# Patient Record
Sex: FEMALE | Race: WHITE | Employment: OTHER | URBAN - METROPOLITAN AREA
[De-identification: names, ages, dates, MRNs, and addresses within clinical notes are randomized per-mention and may not be internally consistent; named-entity substitution may affect disease eponyms.]

---

## 2016-12-01 PROCEDURE — G9678 ONCOLOGY CARE MODEL SERVICE: HCPCS | Performed by: INTERNAL MEDICINE

## 2017-01-13 ENCOUNTER — SNF/IP PROF CHARGE ONLY (OUTPATIENT)
Dept: HEMATOLOGY/ONCOLOGY | Facility: HOSPITAL | Age: 82
End: 2017-01-13

## 2017-01-13 DIAGNOSIS — Z51.81 ENCOUNTER FOR MONITORING AROMATASE INHIBITOR THERAPY: Primary | ICD-10-CM

## 2017-01-13 DIAGNOSIS — Z79.811 ENCOUNTER FOR MONITORING AROMATASE INHIBITOR THERAPY: Primary | ICD-10-CM

## 2017-04-18 RX ORDER — ANASTROZOLE 1 MG/1
TABLET ORAL
Qty: 90 TABLET | Refills: 3 | Status: SHIPPED | OUTPATIENT
Start: 2017-04-18 | End: 2017-06-06

## 2017-05-05 ENCOUNTER — LAB REQUISITION (OUTPATIENT)
Dept: LAB | Facility: HOSPITAL | Age: 82
End: 2017-05-05
Payer: MEDICARE

## 2017-05-05 DIAGNOSIS — E78.00 PURE HYPERCHOLESTEROLEMIA: ICD-10-CM

## 2017-05-05 DIAGNOSIS — N18.30 CHRONIC KIDNEY DISEASE, STAGE III (MODERATE) (HCC): ICD-10-CM

## 2017-05-05 PROCEDURE — 85025 COMPLETE CBC W/AUTO DIFF WBC: CPT | Performed by: INTERNAL MEDICINE

## 2017-05-05 PROCEDURE — 82043 UR ALBUMIN QUANTITATIVE: CPT | Performed by: INTERNAL MEDICINE

## 2017-05-05 PROCEDURE — 85025 COMPLETE CBC W/AUTO DIFF WBC: CPT

## 2017-05-05 PROCEDURE — 81001 URINALYSIS AUTO W/SCOPE: CPT | Performed by: INTERNAL MEDICINE

## 2017-05-05 PROCEDURE — 84443 ASSAY THYROID STIM HORMONE: CPT | Performed by: INTERNAL MEDICINE

## 2017-05-05 PROCEDURE — 80061 LIPID PANEL: CPT | Performed by: INTERNAL MEDICINE

## 2017-05-05 PROCEDURE — 82570 ASSAY OF URINE CREATININE: CPT | Performed by: INTERNAL MEDICINE

## 2017-05-05 PROCEDURE — 84443 ASSAY THYROID STIM HORMONE: CPT

## 2017-05-05 PROCEDURE — 80053 COMPREHEN METABOLIC PANEL: CPT

## 2017-05-05 PROCEDURE — 80053 COMPREHEN METABOLIC PANEL: CPT | Performed by: INTERNAL MEDICINE

## 2017-05-05 PROCEDURE — 80061 LIPID PANEL: CPT

## 2017-05-23 ENCOUNTER — LAB REQUISITION (OUTPATIENT)
Dept: LAB | Facility: HOSPITAL | Age: 82
End: 2017-05-23
Payer: MEDICARE

## 2017-05-23 ENCOUNTER — HOSPITAL ENCOUNTER (OUTPATIENT)
Dept: ULTRASOUND IMAGING | Facility: HOSPITAL | Age: 82
Discharge: HOME | End: 2017-05-23
Attending: INTERNAL MEDICINE
Payer: MEDICARE

## 2017-05-23 DIAGNOSIS — N18.30 CHRONIC KIDNEY DISEASE, STAGE III (MODERATE) (HCC): ICD-10-CM

## 2017-05-23 DIAGNOSIS — N18.4 CHRONIC KIDNEY DISEASE, STAGE IV (SEVERE) (HCC): ICD-10-CM

## 2017-05-23 PROCEDURE — 80048 BASIC METABOLIC PNL TOTAL CA: CPT | Performed by: INTERNAL MEDICINE

## 2017-05-23 PROCEDURE — 76770 US EXAM ABDO BACK WALL COMP: CPT | Performed by: INTERNAL MEDICINE

## 2017-06-06 ENCOUNTER — HOSPITAL ENCOUNTER (INPATIENT)
Facility: HOSPITAL | Age: 82
LOS: 3 days | Discharge: NURSING, REHAB OR LONG-TERM CARE | DRG: 641 | End: 2017-06-09
Attending: EMERGENCY MEDICINE | Admitting: INTERNAL MEDICINE
Payer: MEDICARE

## 2017-06-06 ENCOUNTER — APPOINTMENT (OUTPATIENT)
Dept: GENERAL RADIOLOGY | Facility: HOSPITAL | Age: 82
DRG: 641 | End: 2017-06-06
Attending: EMERGENCY MEDICINE
Payer: MEDICARE

## 2017-06-06 DIAGNOSIS — E87.1 HYPONATREMIA: ICD-10-CM

## 2017-06-06 DIAGNOSIS — R00.0 SINUS TACHYCARDIA: ICD-10-CM

## 2017-06-06 DIAGNOSIS — R53.1 WEAKNESS: Primary | ICD-10-CM

## 2017-06-06 DIAGNOSIS — R77.8 ELEVATED TROPONIN: ICD-10-CM

## 2017-06-06 PROBLEM — R79.89 ELEVATED TROPONIN: Status: ACTIVE | Noted: 2017-06-06

## 2017-06-06 PROCEDURE — 99285 EMERGENCY DEPT VISIT HI MDM: CPT

## 2017-06-06 PROCEDURE — 80061 LIPID PANEL: CPT | Performed by: EMERGENCY MEDICINE

## 2017-06-06 PROCEDURE — 83690 ASSAY OF LIPASE: CPT | Performed by: EMERGENCY MEDICINE

## 2017-06-06 PROCEDURE — 84484 ASSAY OF TROPONIN QUANT: CPT | Performed by: EMERGENCY MEDICINE

## 2017-06-06 PROCEDURE — 80048 BASIC METABOLIC PNL TOTAL CA: CPT | Performed by: EMERGENCY MEDICINE

## 2017-06-06 PROCEDURE — 82728 ASSAY OF FERRITIN: CPT | Performed by: INTERNAL MEDICINE

## 2017-06-06 PROCEDURE — 93005 ELECTROCARDIOGRAM TRACING: CPT

## 2017-06-06 PROCEDURE — 81001 URINALYSIS AUTO W/SCOPE: CPT | Performed by: EMERGENCY MEDICINE

## 2017-06-06 PROCEDURE — 80076 HEPATIC FUNCTION PANEL: CPT | Performed by: EMERGENCY MEDICINE

## 2017-06-06 PROCEDURE — 93010 ELECTROCARDIOGRAM REPORT: CPT | Performed by: EMERGENCY MEDICINE

## 2017-06-06 PROCEDURE — 85025 COMPLETE CBC W/AUTO DIFF WBC: CPT | Performed by: EMERGENCY MEDICINE

## 2017-06-06 PROCEDURE — 36415 COLL VENOUS BLD VENIPUNCTURE: CPT

## 2017-06-06 PROCEDURE — 71010 XR CHEST AP PORTABLE  (CPT=71010): CPT | Performed by: EMERGENCY MEDICINE

## 2017-06-06 RX ORDER — SODIUM CHLORIDE 9 MG/ML
INJECTION, SOLUTION INTRAVENOUS CONTINUOUS
Status: DISCONTINUED | OUTPATIENT
Start: 2017-06-06 | End: 2017-06-08

## 2017-06-06 RX ORDER — ACETAMINOPHEN 325 MG/1
650 TABLET ORAL EVERY 6 HOURS PRN
Status: DISCONTINUED | OUTPATIENT
Start: 2017-06-06 | End: 2017-06-09

## 2017-06-06 RX ORDER — POLYETHYLENE GLYCOL 3350 17 G/17G
17 POWDER, FOR SOLUTION ORAL DAILY PRN
Status: DISCONTINUED | OUTPATIENT
Start: 2017-06-06 | End: 2017-06-09

## 2017-06-06 RX ORDER — ANASTROZOLE 1 MG/1
1 TABLET ORAL DAILY
Status: DISCONTINUED | OUTPATIENT
Start: 2017-06-06 | End: 2017-06-09

## 2017-06-06 RX ORDER — PREDNISONE 1 MG/1
5 TABLET ORAL 2 TIMES DAILY
Status: DISCONTINUED | OUTPATIENT
Start: 2017-06-07 | End: 2017-06-09

## 2017-06-06 RX ORDER — ASPIRIN 81 MG/1
81 TABLET ORAL DAILY
Status: DISCONTINUED | OUTPATIENT
Start: 2017-06-06 | End: 2017-06-09

## 2017-06-06 RX ORDER — BISACODYL 10 MG
10 SUPPOSITORY, RECTAL RECTAL
Status: DISCONTINUED | OUTPATIENT
Start: 2017-06-06 | End: 2017-06-09

## 2017-06-06 RX ORDER — ATORVASTATIN CALCIUM 40 MG/1
40 TABLET, FILM COATED ORAL NIGHTLY
Status: DISCONTINUED | OUTPATIENT
Start: 2017-06-06 | End: 2017-06-09

## 2017-06-06 RX ORDER — ASPIRIN 81 MG/1
324 TABLET, CHEWABLE ORAL ONCE
Status: COMPLETED | OUTPATIENT
Start: 2017-06-06 | End: 2017-06-06

## 2017-06-06 RX ORDER — ONDANSETRON 2 MG/ML
4 INJECTION INTRAMUSCULAR; INTRAVENOUS EVERY 6 HOURS PRN
Status: DISCONTINUED | OUTPATIENT
Start: 2017-06-06 | End: 2017-06-09

## 2017-06-06 RX ORDER — 0.9 % SODIUM CHLORIDE 0.9 %
VIAL (ML) INJECTION
Status: COMPLETED
Start: 2017-06-06 | End: 2017-06-06

## 2017-06-06 RX ORDER — DOCUSATE SODIUM 100 MG/1
100 CAPSULE, LIQUID FILLED ORAL 2 TIMES DAILY
Status: DISCONTINUED | OUTPATIENT
Start: 2017-06-06 | End: 2017-06-09

## 2017-06-06 RX ORDER — SODIUM PHOSPHATE, DIBASIC AND SODIUM PHOSPHATE, MONOBASIC 7; 19 G/133ML; G/133ML
1 ENEMA RECTAL ONCE AS NEEDED
Status: DISCONTINUED | OUTPATIENT
Start: 2017-06-06 | End: 2017-06-09

## 2017-06-06 RX ORDER — SODIUM CHLORIDE 9 MG/ML
1000 INJECTION, SOLUTION INTRAVENOUS ONCE
Status: COMPLETED | OUTPATIENT
Start: 2017-06-06 | End: 2017-06-06

## 2017-06-06 RX ORDER — LISINOPRIL 10 MG/1
10 TABLET ORAL DAILY
Status: DISCONTINUED | OUTPATIENT
Start: 2017-06-06 | End: 2017-06-09

## 2017-06-06 RX ORDER — ENOXAPARIN SODIUM 100 MG/ML
40 INJECTION SUBCUTANEOUS DAILY
Status: DISCONTINUED | OUTPATIENT
Start: 2017-06-06 | End: 2017-06-09

## 2017-06-06 RX ORDER — PREDNISONE 1 MG/1
5 TABLET ORAL DAILY
Status: DISCONTINUED | OUTPATIENT
Start: 2017-06-06 | End: 2017-06-06

## 2017-06-06 NOTE — CONSULTS
[facility]    Cardiology Consultation  [facility]    Gia Davis Patient Status:  Emergency    6/15/1935 MRN [id number]   Location [address] Attending Ashli Amin MD   Critical access hospital MASTECTOMY  AND SENTINEL NODE    MASTECTOMY LEFT Left 5/23/2003    Comment LEFT MASTECTOMY AND SENTINEL NODE    CARDIAC CATHETERIZATION  6/18/2012    HYSTERECTOMY  1985    HIP SURGERY Left 2009    Comment left femur fracture/pubic rami fractur       Family dullness. Normal excursions and effort. Abdomen: Soft, non-tender. No organosplenomegally, mass or rebound, BS-present. Extremities: Without clubbing, cyanosis or edema. Peripheral pulses are 2+. Neurologic: Alert and oriented, normal affect.  No focal

## 2017-06-06 NOTE — ED PROVIDER NOTES
Patient Seen in: [facility] Emergency Department    History   Patient presents with:  Dyspnea NILS SOB (respiratory)    Stated Complaint: fluids on her    HPI    The patient is a 26-year-old female with past history of hypertension, breast cancer, Left 2009    Comment left femur fracture/pubic rami fractur       Medications :   ANASTROZOLE 1 MG Oral Tab tab,  TAKE ONE TABLET BY MOUTH EVERY DAY   anastrozole 1 MG Oral Tab tab,  Take 1 tablet (1 mg total) by mouth daily.    ferrous sulfate 325 (65 FE) 100%        Physical Exam    Constitutional: Well-developed well-nourished in no acute distress  Head: Normocephalic, no swelling or tenderness  Eyes: Nonicteric sclera, no conjunctival injection  ENT: TMs are clear and flat bilaterally.   There is no poste Abnormal            Final result                 Please view results for these tests on the individual orders.    URINALYSIS WITH CULTURE REFLEX   TROPONIN I, 2 HOUR   RAINBOW DRAW BLUE   RAINBOW DRAW GOLD   RAINBOW DRAW LAVENDER   RAINBOW DRAW LIGHT GREEN

## 2017-06-06 NOTE — HISTORICAL OFFICE NOTE
Debbie Daughters  : 06/15/1935  06/28/12 10:50:42  ACCOUNT:  [id number]  HOME PHONE:  [phone]  WORK PHONE:       Pt and daughter in office for s/p angiogram groin check. Site dry and intact, no drainage noted, no s/s of hematoma - bruised to area.   Pen l Xochilt and Dr. Lazcano Listen in 3 to 4 weeks following and we have in cardiology as directed previously. Date of discharge 06/22/12, dictation is 06/25/12.     NIKI Hough  ML:laurie/sandra  DD: 06/25/2012; DT: 06/26/2012  Job #: [id number]

## 2017-06-06 NOTE — ED INITIAL ASSESSMENT (HPI)
Pt reports N/V/D since Thursday with some abdominal pain and chills. Sent to ER by Dr Xavier Degroot.

## 2017-06-07 ENCOUNTER — APPOINTMENT (OUTPATIENT)
Dept: CV DIAGNOSTICS | Facility: HOSPITAL | Age: 82
DRG: 641 | End: 2017-06-07
Attending: INTERNAL MEDICINE
Payer: MEDICARE

## 2017-06-07 PROCEDURE — 84466 ASSAY OF TRANSFERRIN: CPT | Performed by: INTERNAL MEDICINE

## 2017-06-07 PROCEDURE — 84484 ASSAY OF TROPONIN QUANT: CPT | Performed by: INTERNAL MEDICINE

## 2017-06-07 PROCEDURE — 85025 COMPLETE CBC W/AUTO DIFF WBC: CPT | Performed by: INTERNAL MEDICINE

## 2017-06-07 PROCEDURE — 86900 BLOOD TYPING SEROLOGIC ABO: CPT | Performed by: INTERNAL MEDICINE

## 2017-06-07 PROCEDURE — 86850 RBC ANTIBODY SCREEN: CPT | Performed by: INTERNAL MEDICINE

## 2017-06-07 PROCEDURE — 85018 HEMOGLOBIN: CPT | Performed by: INTERNAL MEDICINE

## 2017-06-07 PROCEDURE — 80053 COMPREHEN METABOLIC PANEL: CPT | Performed by: INTERNAL MEDICINE

## 2017-06-07 PROCEDURE — 83540 ASSAY OF IRON: CPT | Performed by: INTERNAL MEDICINE

## 2017-06-07 PROCEDURE — 82728 ASSAY OF FERRITIN: CPT | Performed by: INTERNAL MEDICINE

## 2017-06-07 PROCEDURE — 93306 TTE W/DOPPLER COMPLETE: CPT | Performed by: INTERNAL MEDICINE

## 2017-06-07 PROCEDURE — 86901 BLOOD TYPING SEROLOGIC RH(D): CPT | Performed by: INTERNAL MEDICINE

## 2017-06-07 PROCEDURE — 85014 HEMATOCRIT: CPT | Performed by: INTERNAL MEDICINE

## 2017-06-07 RX ORDER — SODIUM CHLORIDE 9 MG/ML
INJECTION, SOLUTION INTRAVENOUS
Status: COMPLETED
Start: 2017-06-07 | End: 2017-06-07

## 2017-06-07 NOTE — PROGRESS NOTES
[facility]    Progress Note    Suzy Wick Patient Status:  Inpatient    6/15/1935 MRN [id number]   Location [facility] 3W/SW Attending Estefany Samuel MD   Hosp Day # 1 PCP Marya Mckeon MD       Subjective:    Suzy Wick 06/07/17   0521   TROP  0.51*  0.50*  0.41*       No results for input(s): PT, INR in the last 72 hours. No results for input(s): PGLU in the last 72 hours.     Scheduled Meds:   • anastrozole  1 mg Oral Daily   • aspirin  81 mg Oral Daily   • atorvastat

## 2017-06-07 NOTE — TREATMENT PLAN
This RN notified echo department the need for echo prior to scheduled stress test tomorrow. The stress test is dependant on the results of the echo test. This RN informed test would be completed today. This RN will continue to monitor client.

## 2017-06-07 NOTE — PLAN OF CARE
CARDIOVASCULAR - ADULT    • Maintains optimal cardiac output and hemodynamic stability Progressing    • Absence of cardiac arrhythmias or at baseline Progressing    No cardiac arrhythmias noted, medications administered as scheduled     GASTROINTESTINAL -

## 2017-06-07 NOTE — PROGRESS NOTES
06/06/17 2019   Clinical Encounter Type   Visited With Patient; Family   Routine Visit Introduction   Continue Visiting Yes   Referral From Nurse   Referral To    Patient Spiritual Encounters   Spiritual Assessment Completed 1   Spiritual Needs E

## 2017-06-07 NOTE — PROGRESS NOTES
[facility]    Cardiology Progress Note  [facility]    Ama Miller Patient Status:  Inpatient    6/15/1935 MRN [id number]   Location [facility] 3W/SW Attending Saroj Narvaez MD   Hosp Day # 1 PCP [facility] 06/07/2017   TSH 0.88 05/05/2017   LIP 24 06/06/2017   TROP 0.41* 06/07/2017       Xr Chest Ap Portable  (cpt=71010)    6/6/2017  CONCLUSION:  1. No acute cardiopulmonary disease. 2. No significant change has occurred.           Ekg 12-lead    6/6/2017  ECG

## 2017-06-07 NOTE — H&P
[facility]    PATIENT'S NAME: Vonda Coopercordelia CALDERON   ATTENDING PHYSICIAN: Cecilio Irwin MD   PATIENT ACCOUNT#:   [id number]    LOCATION:  [address] [place] [address] RECORD #:   [id number]       YOB: 1935  ADMISSION DATE:       06/06/20 of an enlarged heart and another brother had angina. SOCIAL HISTORY:  The patient does not smoke or take illicit drugs. She does not imbibe. REVIEW OF SYSTEMS:  The patient denies frequent headaches or ophthalmologic complaints.   No dysphagia, hemop evaluate the patient on a daily basis to see if there is marked improvement over time. We will give antiemetics. We will check a GI panel if diarrhea continues. We will monitor low serum sodium levels. Dictated By Willie Wise MD  d: 06/06/201

## 2017-06-08 ENCOUNTER — APPOINTMENT (OUTPATIENT)
Dept: NUCLEAR MEDICINE | Facility: HOSPITAL | Age: 82
DRG: 641 | End: 2017-06-08
Attending: INTERNAL MEDICINE
Payer: MEDICARE

## 2017-06-08 ENCOUNTER — APPOINTMENT (OUTPATIENT)
Dept: CV DIAGNOSTICS | Facility: HOSPITAL | Age: 82
DRG: 641 | End: 2017-06-08
Attending: INTERNAL MEDICINE
Payer: MEDICARE

## 2017-06-08 PROCEDURE — 93018 CV STRESS TEST I&R ONLY: CPT | Performed by: INTERNAL MEDICINE

## 2017-06-08 PROCEDURE — 97161 PT EVAL LOW COMPLEX 20 MIN: CPT

## 2017-06-08 PROCEDURE — 78452 HT MUSCLE IMAGE SPECT MULT: CPT | Performed by: INTERNAL MEDICINE

## 2017-06-08 PROCEDURE — 80048 BASIC METABOLIC PNL TOTAL CA: CPT | Performed by: INTERNAL MEDICINE

## 2017-06-08 PROCEDURE — 85025 COMPLETE CBC W/AUTO DIFF WBC: CPT | Performed by: INTERNAL MEDICINE

## 2017-06-08 PROCEDURE — 97116 GAIT TRAINING THERAPY: CPT

## 2017-06-08 PROCEDURE — 93016 CV STRESS TEST SUPVJ ONLY: CPT | Performed by: INTERNAL MEDICINE

## 2017-06-08 PROCEDURE — 93017 CV STRESS TEST TRACING ONLY: CPT | Performed by: INTERNAL MEDICINE

## 2017-06-08 RX ORDER — 0.9 % SODIUM CHLORIDE 0.9 %
VIAL (ML) INJECTION
Status: COMPLETED
Start: 2017-06-08 | End: 2017-06-08

## 2017-06-08 NOTE — PROGRESS NOTES
[address] [facility]  Progress Note    Thurmon Net Patient Status:  Inpatient    6/15/1935 MRN [id number]   Location [facility] 3W/SW Attending Wendy Hutson MD   Hosp Day # 2 PCP Ebony Ordonez MD : 132 lb (59.875 kg)      Xr Chest Ap Portable  (cpt=71010)    6/6/2017  CONCLUSION:  1. No acute cardiopulmonary disease. 2. No significant change has occurred.           Ekg 12-lead    6/6/2017  ECG Report  Interpretation  -------------------------- Sinus

## 2017-06-08 NOTE — DISCHARGE PLANNING
6/8CM-MD orders received in regards to discharge planning. This Writer attempted to see the Patient, but she was out of her room. Case Management will continue to follow.      -[facility] [id number]

## 2017-06-08 NOTE — PHYSICAL THERAPY NOTE
PHYSICAL THERAPY EVALUATION - INPATIENT     Room Number: 345/345-A  Evaluation Date: 6/8/2017  Type of Evaluation: Initial  Physician Order: PT Eval and Treat    Presenting Problem: Weakneas, SOB and dyspnea  Reason for Therapy: Mobility Dysfunction an Hip fracture, left ([facility]      left hip surgery   • Osteopenia    • S/P colonoscopy 2005     mild diverticulosis / Internal hemorrhoid   • Polymyalgia rheumatica ([facility]    • Portacath in place 2003     venous access   • Herpes zoster 2003     shoulder, left Lower extremity ROM is within functional limits     Lower extremity strength is within functional limits     BALANCE  Static Sitting: Good  Dynamic Sitting: Good  Static Standing: Fair  Dynamic Standing: Fair -    ADDITIONAL TESTS  Additional Tests: No Goal #2 Patient is able to demonstrate transfers Sit to/from Stand at assistance level: modified independent with cane - straight     Goal #2  Current Status    Goal #3 Patient is able to ambulate 200 feet with assist device: cane - straight at assistanc

## 2017-06-08 NOTE — DISCHARGE PLANNING
6/8CM-MD orders received in regards to discharge planning. The Patient and her daughter were seen at bedside.  The Patient resides [place] [facility] [place] [facility] [place] a [facility] home 5 steps in. Prior to hospitalization, the Patient uses a cane when outside her home, Fareed Martinez

## 2017-06-08 NOTE — DIETARY NOTE
ADULT NUTRITION INITIAL ASSESSMENT    Pt is at moderate nutrition risk. Pt does not meet malnutrition criteria.       RECOMMENDATIONS TO MD:  Recommendations to MD: None at present     NUTRITION DIAGNOSIS/PROBLEM:  Inadequate oral intake related to physiol oz)   06/07/17 0514 55.248 kg (121 lb 12.8 oz)   06/06/17 1204 56.7 kg (125 lb)       GASTROINTESTINAL PROBLEMS: Gastroenteritis on admission, pt reports resolved now    FOOD/NUTRITION RELATED HISTORY:  Appetite: Improved  Intake: % for meals recorde

## 2017-06-08 NOTE — PROGRESS NOTES
[facility]    Progress Note    Fleta Coffee Patient Status:  Inpatient    6/15/1935 MRN [id number]   Location [facility] 3W/SW Attending Maryjo Jade MD   Hosp Day # 2 PCP Chintan Burnett MD       Subjective:    Fleta Coffee AST  26  21   ALT  16  13*   BILT  0.4  0.6   TP  5.9  4.7*       Recent Labs   Lab  06/06/17   1254   LIP  24       Recent Labs   Lab  06/06/17   1254  06/06/17   1506  06/07/17   0521   TROP  0.51*  0.50*  0.41*       No results for input(s): PT, INR i

## 2017-06-09 VITALS
BODY MASS INDEX: 21.65 KG/M2 | OXYGEN SATURATION: 99 % | DIASTOLIC BLOOD PRESSURE: 55 MMHG | HEIGHT: 63 IN | RESPIRATION RATE: 18 BRPM | TEMPERATURE: 98 F | WEIGHT: 122.19 LBS | SYSTOLIC BLOOD PRESSURE: 146 MMHG

## 2017-06-09 PROCEDURE — 85025 COMPLETE CBC W/AUTO DIFF WBC: CPT | Performed by: INTERNAL MEDICINE

## 2017-06-09 PROCEDURE — 80048 BASIC METABOLIC PNL TOTAL CA: CPT | Performed by: INTERNAL MEDICINE

## 2017-06-09 RX ORDER — 0.9 % SODIUM CHLORIDE 0.9 %
VIAL (ML) INJECTION
Status: COMPLETED
Start: 2017-06-09 | End: 2017-06-09

## 2017-06-09 NOTE — PLAN OF CARE
Report given to YAMILETH Valente at [facility]. Ernesto verbalized understanding.

## 2017-06-09 NOTE — DISCHARGE PLANNING
10/17CM- 179 [facility] is able to  accept the Patient  today () for transfer at 4:30p.m. This Writer informed the Patient's RN of the above. DISCHARGE PLANNIN [facility] at 4:30 p.m.   Family Member 17  Met  with the Patient an

## 2017-06-09 NOTE — PLAN OF CARE
Problem: Patient/Family Goals  Goal: Patient/Family Short Term Goal  Patient’s Short Term Goal: to have no more episodes of N/V/D    Interventions:   - See additional Care Plan goals for specific interventions   Outcome: Progressing    Problem: Samaria Middleton

## 2017-06-09 NOTE — PROGRESS NOTES
[facility]    Progress Note    Suzy Wick Patient Status:  Inpatient    6/15/1935 MRN [id number]   Location [facility] 3W/SW Attending Estefany Samuel MD   Hosp Day # 3 PCP Marya Mckeon MD       Subjective:    Suyz Wick >60   GFRNAA  42*  60  >60  50*   CA  8.7  7.9*  8.6  8.5   ALB  3.3*  2.6*   --    --    NA  124*  126*  133*  135*   K  4.1  3.9  4.3  4.8   CL  92*  100  105  108   CO2  22  19*  19*  21*   ALKPHO  81  68   --    --    AST  26  21   --    --    ALT  16

## 2017-06-09 NOTE — DISCHARGE PLANNING
6/9CM- Patient is medically stable for discharge today (6/9) This Writer informed [facility] of the above, they are checking bed availability and will follow up with Case Management. This Writer informed the Patient's RN of the above.      Erin

## 2017-07-10 NOTE — DISCHARGE SUMMARY
[facility]    PATIENT'S NAME: Christopher CALDERON   ATTENDING PHYSICIAN: Cecilio Knight MD   PATIENT ACCOUNT#:   [id number]    LOCATION:  [address] [place] [address] RECORD #:   [id number]       YOB: 1935  ADMISSION DATE:       06/06/20 DATE:  None. DISCHARGE DIAGNOSES:    1. Severe gastroenteritis with dehydration. 2.   Hyponatremia. 3.   Iron deficiency anemia on top of chronic anemia. 4.   Acute on chronic kidney disease. 5.   History of breast cancer.   6.   Hypercholesterole

## 2017-07-18 ENCOUNTER — APPOINTMENT (OUTPATIENT)
Dept: GENERAL RADIOLOGY | Facility: HOSPITAL | Age: 82
DRG: 645 | End: 2017-07-18
Attending: EMERGENCY MEDICINE
Payer: MEDICARE

## 2017-07-18 ENCOUNTER — HOSPITAL ENCOUNTER (INPATIENT)
Facility: HOSPITAL | Age: 82
LOS: 2 days | Discharge: HOME | DRG: 645 | End: 2017-07-21
Attending: EMERGENCY MEDICINE | Admitting: INTERNAL MEDICINE
Payer: MEDICARE

## 2017-07-18 DIAGNOSIS — D64.9 ANEMIA, UNSPECIFIED TYPE: ICD-10-CM

## 2017-07-18 DIAGNOSIS — I21.4 NSTEMI (NON-ST ELEVATED MYOCARDIAL INFARCTION) (HCC): Primary | ICD-10-CM

## 2017-07-18 DIAGNOSIS — E87.1 HYPONATREMIA: ICD-10-CM

## 2017-07-18 LAB
BASOPHILS # BLD: 0.1 K/UL (ref 0–0.2)
BASOPHILS NFR BLD: 1 %
EOSINOPHIL # BLD: 0.2 K/UL (ref 0–0.7)
EOSINOPHIL NFR BLD: 2 %
ERYTHROCYTE [DISTWIDTH] IN BLOOD BY AUTOMATED COUNT: 14.7 % (ref 11–15)
HCT VFR BLD AUTO: 27 % (ref 35–48)
HGB BLD-MCNC: 9.3 G/DL (ref 12–16)
LYMPHOCYTES # BLD: 0.8 K/UL (ref 1–4)
LYMPHOCYTES NFR BLD: 6 %
MCH RBC QN AUTO: 30.4 PG (ref 27–32)
MCHC RBC AUTO-ENTMCNC: 34.5 G/DL (ref 32–37)
MCV RBC AUTO: 88.2 FL (ref 80–100)
MONOCYTES # BLD: 0.9 K/UL (ref 0–1)
MONOCYTES NFR BLD: 7 %
NEUTROPHILS # BLD AUTO: 11.1 K/UL (ref 1.8–7.7)
NEUTROPHILS NFR BLD: 84 %
PLATELET # BLD AUTO: 174 K/UL (ref 140–400)
PMV BLD AUTO: 8.2 FL (ref 7.4–10.3)
RBC # BLD AUTO: 3.06 M/UL (ref 3.7–5.4)
TROPONIN I SERPL-MCNC: 0.58 NG/ML (ref ?–0.03)
WBC # BLD AUTO: 13.2 K/UL (ref 4–11)

## 2017-07-18 PROCEDURE — 83880 ASSAY OF NATRIURETIC PEPTIDE: CPT | Performed by: EMERGENCY MEDICINE

## 2017-07-18 PROCEDURE — 84484 ASSAY OF TROPONIN QUANT: CPT | Performed by: EMERGENCY MEDICINE

## 2017-07-18 PROCEDURE — 83690 ASSAY OF LIPASE: CPT | Performed by: EMERGENCY MEDICINE

## 2017-07-18 PROCEDURE — 80048 BASIC METABOLIC PNL TOTAL CA: CPT | Performed by: EMERGENCY MEDICINE

## 2017-07-18 PROCEDURE — 84443 ASSAY THYROID STIM HORMONE: CPT | Performed by: INTERNAL MEDICINE

## 2017-07-18 PROCEDURE — 71010 XR CHEST AP PORTABLE  (CPT=71010): CPT | Performed by: EMERGENCY MEDICINE

## 2017-07-18 PROCEDURE — 80076 HEPATIC FUNCTION PANEL: CPT | Performed by: EMERGENCY MEDICINE

## 2017-07-18 PROCEDURE — 93005 ELECTROCARDIOGRAM TRACING: CPT

## 2017-07-18 PROCEDURE — 85025 COMPLETE CBC W/AUTO DIFF WBC: CPT | Performed by: EMERGENCY MEDICINE

## 2017-07-18 PROCEDURE — 83735 ASSAY OF MAGNESIUM: CPT | Performed by: EMERGENCY MEDICINE

## 2017-07-18 PROCEDURE — 80061 LIPID PANEL: CPT | Performed by: EMERGENCY MEDICINE

## 2017-07-18 PROCEDURE — 99285 EMERGENCY DEPT VISIT HI MDM: CPT

## 2017-07-18 PROCEDURE — 96374 THER/PROPH/DIAG INJ IV PUSH: CPT

## 2017-07-18 PROCEDURE — 96375 TX/PRO/DX INJ NEW DRUG ADDON: CPT

## 2017-07-18 PROCEDURE — 93010 ELECTROCARDIOGRAM REPORT: CPT | Performed by: EMERGENCY MEDICINE

## 2017-07-18 RX ORDER — ONDANSETRON 2 MG/ML
4 INJECTION INTRAMUSCULAR; INTRAVENOUS ONCE
Status: COMPLETED | OUTPATIENT
Start: 2017-07-18 | End: 2017-07-18

## 2017-07-18 RX ORDER — ONDANSETRON 2 MG/ML
INJECTION INTRAMUSCULAR; INTRAVENOUS
Status: COMPLETED
Start: 2017-07-18 | End: 2017-07-18

## 2017-07-18 RX ORDER — ASPIRIN 81 MG/1
324 TABLET, CHEWABLE ORAL ONCE
Status: DISCONTINUED | OUTPATIENT
Start: 2017-07-18 | End: 2017-07-19

## 2017-07-19 PROBLEM — I21.4 NSTEMI (NON-ST ELEVATED MYOCARDIAL INFARCTION) (HCC): Status: ACTIVE | Noted: 2017-07-19

## 2017-07-19 LAB
ALBUMIN SERPL BCP-MCNC: 3.3 G/DL (ref 3.5–4.8)
ALBUMIN SERPL BCP-MCNC: 3.4 G/DL (ref 3.5–4.8)
ALBUMIN/GLOB SERPL: 1.4 {RATIO} (ref 1–2)
ALP SERPL-CCNC: 83 U/L (ref 32–100)
ALP SERPL-CCNC: 85 U/L (ref 32–100)
ALT SERPL-CCNC: 19 U/L (ref 14–54)
ALT SERPL-CCNC: 19 U/L (ref 14–54)
ANION GAP SERPL CALC-SCNC: 12 MMOL/L (ref 0–18)
ANION GAP SERPL CALC-SCNC: 13 MMOL/L (ref 0–18)
AST SERPL-CCNC: 26 U/L (ref 15–41)
AST SERPL-CCNC: 28 U/L (ref 15–41)
BASE EXCESS BLD CALC-SCNC: -1.4 MMOL/L (ref ?–2)
BILIRUB DIRECT SERPL-MCNC: 0.2 MG/DL (ref 0–0.2)
BILIRUB SERPL-MCNC: 0.9 MG/DL (ref 0.3–1.2)
BILIRUB SERPL-MCNC: 0.9 MG/DL (ref 0.3–1.2)
BILIRUB UR QL: NEGATIVE
BNP SERPL-MCNC: 89 PG/ML (ref 0–100)
BUN SERPL-MCNC: 19 MG/DL (ref 8–20)
BUN SERPL-MCNC: 21 MG/DL (ref 8–20)
BUN/CREAT SERPL: 17.4 (ref 10–20)
BUN/CREAT SERPL: 21.4 (ref 10–20)
CA-I BLD-SCNC: 1.14 MMOL/L (ref 0.95–1.32)
CALCIUM SERPL-MCNC: 8.6 MG/DL (ref 8.5–10.5)
CALCIUM SERPL-MCNC: 8.9 MG/DL (ref 8.5–10.5)
CHLORIDE SERPL-SCNC: 84 MMOL/L (ref 95–110)
CHLORIDE SERPL-SCNC: 88 MMOL/L (ref 95–110)
CHOLEST SERPL-MCNC: 154 MG/DL (ref 110–200)
CLARITY UR: CLEAR
CO2 SERPL-SCNC: 19 MMOL/L (ref 22–32)
CO2 SERPL-SCNC: 19 MMOL/L (ref 22–32)
COHGB MFR BLD: 1.9 % (ref 0–1.5)
COLOR UR: YELLOW
CREAT SERPL-MCNC: 0.98 MG/DL (ref 0.5–1.5)
CREAT SERPL-MCNC: 1.09 MG/DL (ref 0.5–1.5)
ERYTHROCYTE [DISTWIDTH] IN BLOOD BY AUTOMATED COUNT: 15.1 % (ref 11–15)
GLOBULIN PLAS-MCNC: 2.5 G/DL (ref 2.5–3.7)
GLUCOSE SERPL-MCNC: 101 MG/DL (ref 70–99)
GLUCOSE SERPL-MCNC: 99 MG/DL (ref 70–99)
GLUCOSE UR-MCNC: NEGATIVE MG/DL
HCO3 BLDA-SCNC: 21.3 MEQ/L (ref 21–27)
HCT VFR BLD AUTO: 26.8 % (ref 35–48)
HDLC SERPL-MCNC: 64 MG/DL
HGB BLD-MCNC: 9.2 G/DL (ref 12–16)
HGB BLD-MCNC: 9.3 G/DL (ref 12–16)
HGB UR QL STRIP.AUTO: NEGATIVE
LDLC SERPL CALC-MCNC: 77 MG/DL (ref 0–99)
LEUKOCYTE ESTERASE UR QL STRIP.AUTO: NEGATIVE
LIPASE SERPL-CCNC: 19 U/L (ref 22–51)
MAGNESIUM SERPL-MCNC: 1.3 MG/DL (ref 1.8–2.5)
MAGNESIUM SERPL-MCNC: 1.8 MG/DL (ref 1.8–2.5)
MCH RBC QN AUTO: 30.2 PG (ref 27–32)
MCHC RBC AUTO-ENTMCNC: 34.5 G/DL (ref 32–37)
MCV RBC AUTO: 87.7 FL (ref 80–100)
METHGB MFR BLD: 0 % SAT (ref 0.4–1.5)
MODIFIED ALLEN TEST: POSITIVE
MRSA DNA SPEC QL NAA+PROBE: NEGATIVE
NITRITE UR QL STRIP.AUTO: NEGATIVE
NONHDLC SERPL-MCNC: 90 MG/DL
O2 CT BLD-SCNC: 12.7 VOL% (ref 15–23)
OSMOLALITY UR CALC.SUM OF ELEC: 245 MOSM/KG (ref 275–295)
OSMOLALITY UR CALC.SUM OF ELEC: 250 MOSM/KG (ref 275–295)
PCO2 BLDA: 30 MM HG (ref 35–45)
PH BLDA: 7.47 [PH] (ref 7.35–7.45)
PH UR: 6 [PH] (ref 5–8)
PLATELET # BLD AUTO: 176 K/UL (ref 140–400)
PMV BLD AUTO: 8.7 FL (ref 7.4–10.3)
PO2 BLDA: 87 MM HG (ref 80–100)
PO2 SATN ADJ TO 0.5 BLD: 24 MMHG (ref 24–28)
POTASSIUM (BLOOD GAS): 4.1 MMOL/L (ref 3.3–5.1)
POTASSIUM SERPL-SCNC: 3.9 MMOL/L (ref 3.3–5.1)
POTASSIUM SERPL-SCNC: 4 MMOL/L (ref 3.3–5.1)
PROT SERPL-MCNC: 5.8 G/DL (ref 5.9–8.4)
PROT SERPL-MCNC: 5.9 G/DL (ref 5.9–8.4)
PROT UR-MCNC: NEGATIVE MG/DL
PUNCTURE CHARGE: YES
RBC # BLD AUTO: 3.05 M/UL (ref 3.7–5.4)
SAO2 % BLDA: 98.4 % (ref 94–100)
SODIUM (BLOOD GAS): 120 MMOL/L (ref 135–145)
SODIUM SERPL-SCNC: 116 MMOL/L (ref 136–144)
SODIUM SERPL-SCNC: 119 MMOL/L (ref 136–144)
SODIUM UR-SCNC: 74 MMOL/L
SP GR UR STRIP: 1.01 (ref 1–1.03)
TRIGL SERPL-MCNC: 65 MG/DL (ref 1–149)
TROPONIN I SERPL-MCNC: 0.57 NG/ML (ref ?–0.03)
TSH SERPL-ACNC: 0.96 UIU/ML (ref 0.45–5.33)
UROBILINOGEN UR STRIP-ACNC: <2
VIT C UR-MCNC: NEGATIVE MG/DL
WBC # BLD AUTO: 12.7 K/UL (ref 4–11)

## 2017-07-19 PROCEDURE — 81003 URINALYSIS AUTO W/O SCOPE: CPT | Performed by: EMERGENCY MEDICINE

## 2017-07-19 PROCEDURE — 84300 ASSAY OF URINE SODIUM: CPT | Performed by: INTERNAL MEDICINE

## 2017-07-19 PROCEDURE — 80053 COMPREHEN METABOLIC PANEL: CPT | Performed by: INTERNAL MEDICINE

## 2017-07-19 PROCEDURE — 85018 HEMOGLOBIN: CPT | Performed by: EMERGENCY MEDICINE

## 2017-07-19 PROCEDURE — 82330 ASSAY OF CALCIUM: CPT | Performed by: EMERGENCY MEDICINE

## 2017-07-19 PROCEDURE — 84295 ASSAY OF SERUM SODIUM: CPT | Performed by: EMERGENCY MEDICINE

## 2017-07-19 PROCEDURE — 82375 ASSAY CARBOXYHB QUANT: CPT | Performed by: EMERGENCY MEDICINE

## 2017-07-19 PROCEDURE — 93005 ELECTROCARDIOGRAM TRACING: CPT

## 2017-07-19 PROCEDURE — 87641 MR-STAPH DNA AMP PROBE: CPT | Performed by: INTERNAL MEDICINE

## 2017-07-19 PROCEDURE — 83735 ASSAY OF MAGNESIUM: CPT | Performed by: INTERNAL MEDICINE

## 2017-07-19 PROCEDURE — 82805 BLOOD GASES W/O2 SATURATION: CPT | Performed by: EMERGENCY MEDICINE

## 2017-07-19 PROCEDURE — 83050 HGB METHEMOGLOBIN QUAN: CPT | Performed by: EMERGENCY MEDICINE

## 2017-07-19 PROCEDURE — 36600 WITHDRAWAL OF ARTERIAL BLOOD: CPT | Performed by: EMERGENCY MEDICINE

## 2017-07-19 PROCEDURE — 93010 ELECTROCARDIOGRAM REPORT: CPT | Performed by: EMERGENCY MEDICINE

## 2017-07-19 PROCEDURE — 84132 ASSAY OF SERUM POTASSIUM: CPT | Performed by: EMERGENCY MEDICINE

## 2017-07-19 PROCEDURE — 84484 ASSAY OF TROPONIN QUANT: CPT | Performed by: EMERGENCY MEDICINE

## 2017-07-19 PROCEDURE — 85027 COMPLETE CBC AUTOMATED: CPT | Performed by: INTERNAL MEDICINE

## 2017-07-19 RX ORDER — ATORVASTATIN CALCIUM 40 MG/1
40 TABLET, FILM COATED ORAL NIGHTLY
Status: DISCONTINUED | OUTPATIENT
Start: 2017-07-19 | End: 2017-07-21

## 2017-07-19 RX ORDER — MAGNESIUM SULFATE HEPTAHYDRATE 40 MG/ML
2 INJECTION, SOLUTION INTRAVENOUS ONCE
Status: COMPLETED | OUTPATIENT
Start: 2017-07-19 | End: 2017-07-19

## 2017-07-19 RX ORDER — SODIUM PHOSPHATE, DIBASIC AND SODIUM PHOSPHATE, MONOBASIC 7; 19 G/133ML; G/133ML
1 ENEMA RECTAL ONCE AS NEEDED
Status: DISCONTINUED | OUTPATIENT
Start: 2017-07-19 | End: 2017-07-21

## 2017-07-19 RX ORDER — ACETAMINOPHEN 325 MG/1
650 TABLET ORAL EVERY 6 HOURS PRN
Status: DISCONTINUED | OUTPATIENT
Start: 2017-07-19 | End: 2017-07-21

## 2017-07-19 RX ORDER — ONDANSETRON 2 MG/ML
4 INJECTION INTRAMUSCULAR; INTRAVENOUS EVERY 4 HOURS PRN
Status: DISCONTINUED | OUTPATIENT
Start: 2017-07-19 | End: 2017-07-21

## 2017-07-19 RX ORDER — ONDANSETRON 2 MG/ML
4 INJECTION INTRAMUSCULAR; INTRAVENOUS EVERY 6 HOURS PRN
Status: DISCONTINUED | OUTPATIENT
Start: 2017-07-19 | End: 2017-07-21

## 2017-07-19 RX ORDER — 0.9 % SODIUM CHLORIDE 0.9 %
VIAL (ML) INJECTION
Status: DISPENSED
Start: 2017-07-19 | End: 2017-07-19

## 2017-07-19 RX ORDER — PREDNISONE 1 MG/1
5 TABLET ORAL DAILY
Status: DISCONTINUED | OUTPATIENT
Start: 2017-07-19 | End: 2017-07-21

## 2017-07-19 RX ORDER — ONDANSETRON 2 MG/ML
INJECTION INTRAMUSCULAR; INTRAVENOUS
Status: COMPLETED
Start: 2017-07-19 | End: 2017-07-19

## 2017-07-19 RX ORDER — SODIUM CHLORIDE 9 MG/ML
INJECTION, SOLUTION INTRAVENOUS CONTINUOUS
Status: DISCONTINUED | OUTPATIENT
Start: 2017-07-19 | End: 2017-07-21

## 2017-07-19 RX ORDER — MAGNESIUM OXIDE 400 MG (241.3 MG MAGNESIUM) TABLET
400 TABLET ONCE
Status: COMPLETED | OUTPATIENT
Start: 2017-07-19 | End: 2017-07-19

## 2017-07-19 RX ORDER — SODIUM CHLORIDE 0.9 % (FLUSH) 0.9 %
3 SYRINGE (ML) INJECTION AS NEEDED
Status: DISCONTINUED | OUTPATIENT
Start: 2017-07-19 | End: 2017-07-21

## 2017-07-19 RX ORDER — BISACODYL 10 MG
10 SUPPOSITORY, RECTAL RECTAL
Status: DISCONTINUED | OUTPATIENT
Start: 2017-07-19 | End: 2017-07-21

## 2017-07-19 RX ORDER — ANASTROZOLE 1 MG/1
1 TABLET ORAL DAILY
Status: DISCONTINUED | OUTPATIENT
Start: 2017-07-19 | End: 2017-07-21

## 2017-07-19 RX ORDER — ENOXAPARIN SODIUM 100 MG/ML
40 INJECTION SUBCUTANEOUS DAILY
Status: DISCONTINUED | OUTPATIENT
Start: 2017-07-19 | End: 2017-07-20

## 2017-07-19 RX ORDER — METOCLOPRAMIDE HYDROCHLORIDE 5 MG/ML
10 INJECTION INTRAMUSCULAR; INTRAVENOUS EVERY 8 HOURS PRN
Status: DISCONTINUED | OUTPATIENT
Start: 2017-07-19 | End: 2017-07-21

## 2017-07-19 RX ORDER — PANTOPRAZOLE SODIUM 40 MG/1
40 TABLET, DELAYED RELEASE ORAL
Status: DISCONTINUED | OUTPATIENT
Start: 2017-07-19 | End: 2017-07-21

## 2017-07-19 RX ORDER — DOCUSATE SODIUM 100 MG/1
100 CAPSULE, LIQUID FILLED ORAL 2 TIMES DAILY
Status: DISCONTINUED | OUTPATIENT
Start: 2017-07-19 | End: 2017-07-21

## 2017-07-19 RX ORDER — POLYETHYLENE GLYCOL 3350 17 G/17G
17 POWDER, FOR SOLUTION ORAL DAILY PRN
Status: DISCONTINUED | OUTPATIENT
Start: 2017-07-19 | End: 2017-07-21

## 2017-07-19 RX ORDER — METOCLOPRAMIDE HYDROCHLORIDE 5 MG/ML
5 INJECTION INTRAMUSCULAR; INTRAVENOUS ONCE
Status: COMPLETED | OUTPATIENT
Start: 2017-07-19 | End: 2017-07-19

## 2017-07-19 RX ORDER — HYDRALAZINE HYDROCHLORIDE 20 MG/ML
10 INJECTION INTRAMUSCULAR; INTRAVENOUS EVERY 2 HOUR PRN
Status: DISCONTINUED | OUTPATIENT
Start: 2017-07-19 | End: 2017-07-21

## 2017-07-19 RX ORDER — SODIUM CHLORIDE 9 MG/ML
INJECTION, SOLUTION INTRAVENOUS CONTINUOUS
Status: DISCONTINUED | OUTPATIENT
Start: 2017-07-19 | End: 2017-07-19

## 2017-07-19 RX ORDER — LISINOPRIL 10 MG/1
10 TABLET ORAL DAILY
Status: DISCONTINUED | OUTPATIENT
Start: 2017-07-19 | End: 2017-07-20

## 2017-07-19 NOTE — ED INITIAL ASSESSMENT (HPI)
Patient discharged from [facility] Thursday. Patient states that this morning she had N/V and generalized weakness. Denies recent fevers.

## 2017-07-19 NOTE — PROGRESS NOTES
Chart reviewed, patient examined. No pain or dyspnea. Stable BP  Sinus tachycardia. Elevated troponin, similar to six weeks ago. Normal nuc stress and echo at that time. No ischemic EKG abnormalities. Peaked T waves of uncertain significance.     Plan:

## 2017-07-19 NOTE — ED PROVIDER NOTES
Patient Seen in: [facility] Emergency Department    History   Patient presents with:  Fatigue (constitutional, neurologic)    Stated Complaint:     HPI    68-year-old female recently discharged from [facility] who yesterday began having some mild c daily. Atorvastatin Calcium 40 MG Oral Tab,  Take 40 mg by mouth nightly. metoprolol Tartrate (LOPRESSOR) 25 MG Oral Tab,  Take 25 mg by mouth 2 (two) times daily. Calcium Carbonate-Vitamin D (CALTRATE 600+D OR),  Take 1 tablet by mouth daily. tenderness. Neurological: Alert and oriented to person, place, and time. Skin: Skin is warm and dry. Psychiatric: Normal mood and affect. Behavior is normal.   Nursing note and vitals reviewed.     Differential diagnosis includes dehydration, UTI, hy Absolute 0.8 (*)     All other components within normal limits   BNP (B TYPE NATRIUERTIC PEPTIDE) - Normal   LIPID PANEL - Normal   CBC WITH DIFFERENTIAL WITH PLATELET    Narrative:      The following orders were created for panel order CBC WITH DIFFERENTIA (Principal)NSTEMI (non-ST elevated myocardial infarction) ([facility] I21.4 7/19/2017 Unknown    Hyponatremia E87.1 6/6/2017

## 2017-07-19 NOTE — CONSULTS
[facility]  Report of Consultation    [facility] Patient Status:  Inpatient    6/15/1935 MRN [id number]   Location [facility] 2W/SW Attending Lisa Emery MD   Hosp Day # 0 PCP MD Brianna Gunter MASTECTOMY AND SENTINEL NODE  11/23/2004: MASTECTOMY RIGHT Right      Comment: RIGHT MASTECTOMY  AND SENTINEL NODE  Family History   Problem Relation Age of Onset   • in child birth[other] Wilhemena Fuel Mother      35   • old age[other] Wilhemena Fuel Father      80 Laboratories and Data:  Diagnostics:    Labs:     Lab Results  Component Value Date   WBC 13.2 07/18/2017   RBC 3.06 07/18/2017   HGB 9.3 07/18/2017   HCT 27.0 07/18/2017   MCV 88.2 07/18/2017   MCH 30.4 07/18/2017   MCHC 34.5 07/18/2017   RDW 14.7 07/

## 2017-07-19 NOTE — CONSULTS
[facility]OS[facility] - [facility]    Report of Endocrinology Consultation  [facility]    Ashley Coley Patient Status:  Inpatient    6/15/1935 MRN [id number]   Location [facility] 2W/SW Attending Jun Humphrey MD   Hosp Day # 0 PCP C Right      Comment: RIGHT MASTECTOMY  AND SENTINEL NODE  Family History   Problem Relation Age of Onset   • in child birth[other] Prashant Diaz Mother      35   • old age[other] Prashant Diaz Father      80   • Heart Disease Brother    • Heart Disease Brother      con negative  Eyes: negative  Ears, nose, mouth, throat, and face: negative  Respiratory: negative  Cardiovascular: negative  Gastrointestinal: negative  Genitourinary:negative  Integument/breast: negative  Hematologic/lymphatic: negative  Musculoskeletal:nega level and free water restrict  Family will need to make sure that she isnt missing any prednisone doses at home as well  Continue normal saline for now    Pelon Nichols MD

## 2017-07-19 NOTE — ED NOTES
Attempted to cath patient for urine specimen, however patient's pants were saturated with urine. Dr. Magno Gold aware. Will attempt again in 30 minutes.

## 2017-07-19 NOTE — H&P
[facility]    PATIENT'S NAME: Karla Gooden KVNG   ATTENDING PHYSICIAN: Cecilio Thomas MD   PATIENT ACCOUNT#:   [id number]    LOCATION:  [address] [place] [address] [address] RECORD #:   [id number]       YOB: 1935  ADMISSION DATE:       07/18/20 angina. SOCIAL HISTORY:  The patient lives with her daughter in [facility]. HABITS:  Patient does not smoke, drink, or take illicit drugs. REVIEW OF SYSTEMS:  Patient denies frequent headaches or recent change in vision.   No dysphagia, hemoptysi admitted to the PCU status. We will give saline and monitor sodium levels. We will follow potassium, magnesium protocol. We will monitor renal function. Cardiology for hypertension. We will resume oral medications. We will check iron studies.   Brandi Valdez

## 2017-07-20 LAB
ANION GAP SERPL CALC-SCNC: 5 MMOL/L (ref 0–18)
BASOPHILS # BLD: 0.1 K/UL (ref 0–0.2)
BASOPHILS NFR BLD: 1 %
BUN SERPL-MCNC: 15 MG/DL (ref 8–20)
BUN/CREAT SERPL: 13.5 (ref 10–20)
CALCIUM SERPL-MCNC: 7.8 MG/DL (ref 8.5–10.5)
CHLORIDE SERPL-SCNC: 98 MMOL/L (ref 95–110)
CO2 SERPL-SCNC: 21 MMOL/L (ref 22–32)
CREAT SERPL-MCNC: 1.11 MG/DL (ref 0.5–1.5)
EOSINOPHIL # BLD: 0.2 K/UL (ref 0–0.7)
EOSINOPHIL NFR BLD: 2 %
ERYTHROCYTE [DISTWIDTH] IN BLOOD BY AUTOMATED COUNT: 15.1 % (ref 11–15)
ERYTHROCYTE [SEDIMENTATION RATE] IN BLOOD: 36 MM/HR (ref 0–30)
FERRITIN SERPL IA-MCNC: 623 NG/ML (ref 11–307)
GLUCOSE SERPL-MCNC: 89 MG/DL (ref 70–99)
HCT VFR BLD AUTO: 23.3 % (ref 35–48)
HGB BLD-MCNC: 8 G/DL (ref 12–16)
IRON SATN MFR SERPL: 35 % (ref 15–50)
IRON SERPL-MCNC: 53 MCG/DL (ref 28–170)
LYMPHOCYTES # BLD: 1 K/UL (ref 1–4)
LYMPHOCYTES NFR BLD: 12 %
MAGNESIUM SERPL-MCNC: 1.8 MG/DL (ref 1.8–2.5)
MCH RBC QN AUTO: 30.6 PG (ref 27–32)
MCHC RBC AUTO-ENTMCNC: 34.4 G/DL (ref 32–37)
MCV RBC AUTO: 88.9 FL (ref 80–100)
MONOCYTES # BLD: 0.8 K/UL (ref 0–1)
MONOCYTES NFR BLD: 9 %
NEUTROPHILS # BLD AUTO: 6.6 K/UL (ref 1.8–7.7)
NEUTROPHILS NFR BLD: 77 %
OSMOLALITY UR CALC.SUM OF ELEC: 258 MOSM/KG (ref 275–295)
PLATELET # BLD AUTO: 149 K/UL (ref 140–400)
PMV BLD AUTO: 8.5 FL (ref 7.4–10.3)
POTASSIUM SERPL-SCNC: 3.8 MMOL/L (ref 3.3–5.1)
RBC # BLD AUTO: 2.62 M/UL (ref 3.7–5.4)
SODIUM SERPL-SCNC: 124 MMOL/L (ref 136–144)
T4 FREE SERPL-MCNC: 1.23 NG/DL (ref 0.58–1.64)
TIBC SERPL-MCNC: 150 MCG/DL (ref 228–428)
TRANSFERRIN SERPL-MCNC: 114 MG/DL (ref 192–382)
TSH SERPL-ACNC: 0.73 UIU/ML (ref 0.45–5.33)
WBC # BLD AUTO: 8.7 K/UL (ref 4–11)

## 2017-07-20 PROCEDURE — 82728 ASSAY OF FERRITIN: CPT | Performed by: INTERNAL MEDICINE

## 2017-07-20 PROCEDURE — 84466 ASSAY OF TRANSFERRIN: CPT | Performed by: INTERNAL MEDICINE

## 2017-07-20 PROCEDURE — 80048 BASIC METABOLIC PNL TOTAL CA: CPT | Performed by: INTERNAL MEDICINE

## 2017-07-20 PROCEDURE — 84439 ASSAY OF FREE THYROXINE: CPT | Performed by: INTERNAL MEDICINE

## 2017-07-20 PROCEDURE — 83735 ASSAY OF MAGNESIUM: CPT | Performed by: INTERNAL MEDICINE

## 2017-07-20 PROCEDURE — 85025 COMPLETE CBC W/AUTO DIFF WBC: CPT | Performed by: INTERNAL MEDICINE

## 2017-07-20 PROCEDURE — 84443 ASSAY THYROID STIM HORMONE: CPT | Performed by: INTERNAL MEDICINE

## 2017-07-20 PROCEDURE — 83540 ASSAY OF IRON: CPT | Performed by: INTERNAL MEDICINE

## 2017-07-20 PROCEDURE — 85652 RBC SED RATE AUTOMATED: CPT | Performed by: INTERNAL MEDICINE

## 2017-07-20 RX ORDER — POTASSIUM CHLORIDE 20 MEQ/1
40 TABLET, EXTENDED RELEASE ORAL ONCE
Status: COMPLETED | OUTPATIENT
Start: 2017-07-20 | End: 2017-07-20

## 2017-07-20 RX ORDER — LOSARTAN POTASSIUM 25 MG/1
25 TABLET ORAL DAILY
Status: DISCONTINUED | OUTPATIENT
Start: 2017-07-20 | End: 2017-07-21

## 2017-07-20 RX ORDER — MAGNESIUM OXIDE 400 MG (241.3 MG MAGNESIUM) TABLET
400 TABLET ONCE
Status: COMPLETED | OUTPATIENT
Start: 2017-07-20 | End: 2017-07-20

## 2017-07-20 NOTE — PROGRESS NOTES
[facility] HOSP - [facility]    Progress Note    Callie Cast Patient Status:  Inpatient    6/15/1935 MRN [id number]   Location [facility] 3W/SW Attending Maty Patel MD   Hosp Day # 1 PCP Le Mcguire MD       Subjective:    Callie Cast 3.8   CL  84*  88*  98   CO2  19*  19*  21*   ALKPHO  85  83   --    AST  26  28   --    ALT  19  19   --    BILT  0.9  0.9   --    TP  5.8*  5.9   --        Recent Labs   Lab  07/18/17   2325   LIP  19*       Recent Labs   Lab  07/18/17   2325  07/19/17

## 2017-07-20 NOTE — PROGRESS NOTES
[facility]    Endocrine Progress Note  [facility]    Marylu Griffin Patient Status:  Inpatient    6/15/1935 MRN [id number]   Location [facility] 3W/SW Attending Ade Salazar MD   [facility] Day # 1 PCP Maryam Rehman (DELTASONE) tab 5 mg, 5 mg, Oral, Daily  •  Pantoprazole Sodium (PROTONIX) EC tab 40 mg, 40 mg, Oral, QAM AC    Objective:   Blood pressure 120/52, pulse 78, temperature 98 °F (36.7 °C), temperature source Oral, resp.  rate 18, height 5' 2\" (1.575 m), [facility] 06/06/2017 15:43:24 high voltage in the precordial leads Electronically signed on 07/19/2017 at 12:09 by Annabel Reynoso MD  7/20/2017

## 2017-07-20 NOTE — PROGRESS NOTES
[facility]  Progress Note    Dk Segura Patient Status:  Inpatient    6/15/1935 MRN [id number]   Location [facility] 3W/SW Attending Keena Cason MD   Hosp Day # 1 PCP Litzy Caldwell MD     Assessment:    1.  Hyponatremia, improving found for: PT, INR    Lab Results  Component Value Date    07/20/2017   K 3.8 07/20/2017   CL 98 07/20/2017   CO2 21 07/20/2017   BUN 15 07/20/2017   CREATSERUM 1.11 07/20/2017   GLU 89 07/20/2017   MG 1.8 07/20/2017   CA 7.8 07/20/2017          Lab

## 2017-07-20 NOTE — PROGRESS NOTES
Report given to [facility] and plan of care. Adrienne Hernandez will call [facility] by 6 a.m and inform her of the transfer. Pt made aware of transfer. Telemetry monitor applied and signal received according to the transporter.  All belonging and hard copy chart transported

## 2017-07-21 ENCOUNTER — SURGERY (OUTPATIENT)
Age: 82
End: 2017-07-21

## 2017-07-21 ENCOUNTER — ANESTHESIA EVENT (OUTPATIENT)
Dept: ENDOSCOPY | Facility: HOSPITAL | Age: 82
DRG: 645 | End: 2017-07-21
Payer: MEDICARE

## 2017-07-21 ENCOUNTER — ANESTHESIA (OUTPATIENT)
Dept: ENDOSCOPY | Facility: HOSPITAL | Age: 82
DRG: 645 | End: 2017-07-21
Payer: MEDICARE

## 2017-07-21 LAB
ANION GAP SERPL CALC-SCNC: 5 MMOL/L (ref 0–18)
BUN SERPL-MCNC: 14 MG/DL (ref 8–20)
BUN/CREAT SERPL: 14.1 (ref 10–20)
CALCIUM SERPL-MCNC: 7.9 MG/DL (ref 8.5–10.5)
CHLORIDE SERPL-SCNC: 107 MMOL/L (ref 95–110)
CO2 SERPL-SCNC: 21 MMOL/L (ref 22–32)
CREAT SERPL-MCNC: 0.99 MG/DL (ref 0.5–1.5)
GLUCOSE SERPL-MCNC: 80 MG/DL (ref 70–99)
MAGNESIUM SERPL-MCNC: 2 MG/DL (ref 1.8–2.5)
OSMOLALITY UR CALC.SUM OF ELEC: 275 MOSM/KG (ref 275–295)
POTASSIUM SERPL-SCNC: 4.6 MMOL/L (ref 3.3–5.1)
POTASSIUM SERPL-SCNC: 4.6 MMOL/L (ref 3.3–5.1)
SODIUM SERPL-SCNC: 133 MMOL/L (ref 136–144)

## 2017-07-21 PROCEDURE — 97530 THERAPEUTIC ACTIVITIES: CPT

## 2017-07-21 PROCEDURE — 83735 ASSAY OF MAGNESIUM: CPT | Performed by: INTERNAL MEDICINE

## 2017-07-21 PROCEDURE — 0DB68ZX EXCISION OF STOMACH, VIA NATURAL OR ARTIFICIAL OPENING ENDOSCOPIC, DIAGNOSTIC: ICD-10-PCS | Performed by: INTERNAL MEDICINE

## 2017-07-21 PROCEDURE — 82272 OCCULT BLD FECES 1-3 TESTS: CPT | Performed by: INTERNAL MEDICINE

## 2017-07-21 PROCEDURE — 88305 TISSUE EXAM BY PATHOLOGIST: CPT | Performed by: INTERNAL MEDICINE

## 2017-07-21 PROCEDURE — 80048 BASIC METABOLIC PNL TOTAL CA: CPT | Performed by: INTERNAL MEDICINE

## 2017-07-21 PROCEDURE — 97116 GAIT TRAINING THERAPY: CPT

## 2017-07-21 PROCEDURE — 97162 PT EVAL MOD COMPLEX 30 MIN: CPT

## 2017-07-21 PROCEDURE — 88312 SPECIAL STAINS GROUP 1: CPT | Performed by: INTERNAL MEDICINE

## 2017-07-21 PROCEDURE — 84132 ASSAY OF SERUM POTASSIUM: CPT | Performed by: INTERNAL MEDICINE

## 2017-07-21 RX ORDER — SODIUM CHLORIDE, SODIUM LACTATE, POTASSIUM CHLORIDE, CALCIUM CHLORIDE 600; 310; 30; 20 MG/100ML; MG/100ML; MG/100ML; MG/100ML
INJECTION, SOLUTION INTRAVENOUS CONTINUOUS PRN
Status: DISCONTINUED | OUTPATIENT
Start: 2017-07-21 | End: 2017-07-21 | Stop reason: SURG

## 2017-07-21 RX ORDER — LIDOCAINE HYDROCHLORIDE 10 MG/ML
INJECTION, SOLUTION EPIDURAL; INFILTRATION; INTRACAUDAL; PERINEURAL AS NEEDED
Status: DISCONTINUED | OUTPATIENT
Start: 2017-07-21 | End: 2017-07-21 | Stop reason: SURG

## 2017-07-21 RX ADMIN — SODIUM CHLORIDE, SODIUM LACTATE, POTASSIUM CHLORIDE, CALCIUM CHLORIDE: 600; 310; 30; 20 INJECTION, SOLUTION INTRAVENOUS at 10:45:00

## 2017-07-21 RX ADMIN — LIDOCAINE HYDROCHLORIDE 30 MG: 10 INJECTION, SOLUTION EPIDURAL; INFILTRATION; INTRACAUDAL; PERINEURAL at 10:50:00

## 2017-07-21 RX ADMIN — SODIUM CHLORIDE, SODIUM LACTATE, POTASSIUM CHLORIDE, CALCIUM CHLORIDE: 600; 310; 30; 20 INJECTION, SOLUTION INTRAVENOUS at 11:00:00

## 2017-07-21 RX ADMIN — LIDOCAINE HYDROCHLORIDE 20 MG: 10 INJECTION, SOLUTION EPIDURAL; INFILTRATION; INTRACAUDAL; PERINEURAL at 10:46:00

## 2017-07-21 NOTE — INTERVAL H&P NOTE
Pre-op Diagnosis: anemia    The above referenced H&P was reviewed by Winter Damon MD on 7/21/2017, the patient was examined and no significant changes have occurred in the patient's condition since the H&P was performed.   I discussed with the patient a

## 2017-07-21 NOTE — ANESTHESIA PREPROCEDURE EVALUATION
Anesthesia PreOp Note    HPI:     Debbie Saldana is a [de-identified]year old female who presents for preoperative consultation requested by: David Watkins MD    Date of Surgery: 7/18/2017 - 7/21/2017    Procedure(s):  ESOPHAGOGASTRODUODENOSCOPY (EGD)  Indication SURGERY  6/18/2012: CARDIAC CATHETERIZATION  2009: HIP SURGERY Left      Comment: left femur fracture/pubic rami fractur  1985: HYSTERECTOMY  5/23/2003: MASTECTOMY LEFT Left      Comment: LEFT MASTECTOMY AND SENTINEL NODE  11/23/2004: MASTECTOMY RIGHT Ford Saline Flush 0.9 % injection 3 mL 3 mL Intravenous PRN Tracy Mohr MD 20 mL at 07/19/17 1150   ondansetron HCl (ZOFRAN) injection 4 mg 4 mg Intravenous Q6H PRN Tracy Mohr MD    Metoclopramide HCl (REGLAN) injection 10 mg 10 mg Intravenous Q8H PRN Caffeine Concern Yes    Comment: two cups coffee tea daily     Social History Narrative   None on file       Available pre-op labs reviewed.     Lab Results  Component Value Date   WBC 8.7 07/20/2017   RBC 2.62 (L) 07/20/2017   HGB 8.0 (L) 07/20/2017   H Tiff Love  7/21/2017 10:27 AM

## 2017-07-21 NOTE — PROGRESS NOTES
[facility]  Progress Note    José Antonio Doherty Patient Status:  Inpatient    6/15/1935 MRN [id number]   Location [facility] 3W/SW Attending Nestor Trinh MD   Hosp Day # 2 PCP Farrah Chapa MD     Assessment:    1.  Hyponatremia, improving Results  Component Value Date    07/21/2017   K 4.6 07/21/2017   K 4.6 07/21/2017    07/21/2017   CO2 21 07/21/2017   BUN 14 07/21/2017   CREATSERUM 0.99 07/21/2017   GLU 80 07/21/2017   MG 2.0 07/21/2017   CA 7.9 07/21/2017          Lab Result

## 2017-07-21 NOTE — CONSULTS
[facility]    Report of Consultation    Geo Mati Patient Status:  Inpatient    6/15/1935 MRN [id number]   Location [facility] 3W/SW Attending Ita Hutson MD   Hosp Day # 1 PCP Jerardo Macias MD     Date of Admission:  7 mild diverticulosis / Internal hemorrhoid       Past Surgical History  Past Surgical History:  No date: BREAST SURGERY  6/18/2012: CARDIAC CATHETERIZATION  2009: HIP SURGERY Left      Comment: left femur fracture/pubic rami fractur  1985: HYSTERECTOMY (ARIMIDEX) tab 1 mg 1 mg Oral Daily   atorvastatin (LIPITOR) tab 40 mg 40 mg Oral Nightly   metoprolol Tartrate (LOPRESSOR) tab 25 mg 25 mg Oral BID   predniSONE (DELTASONE) tab 5 mg 5 mg Oral Daily   Pantoprazole Sodium (PROTONIX) EC tab 40 mg 40 mg Oral normal active BS, no tenderness on palpation, no masses or ascites, normal liver span/no organomegaly  EXTREMITIES: no calf tenderness  MUSCULOSKELETAL: no calf tenderness  PSYCH: normal affect, forgetful  NUT: well nourished appearing, no cachexia      Re hyponatremia, resolving. Chronically elevated troponin of unclear etiology, evaluated by cardiology with negative stress test and echocardiogram.    Recommendations:  Dr. Lamine Irwin spoke to daughter Ana Torres about her abdominal symptoms and anemia.   Discussed col

## 2017-07-21 NOTE — HOME CARE LIAISON
ORDERS RECEIVED TO [address] [place] [address]. MET WITH PATIENT AND HER DAUGHTER ANA, TO DISCUSS PLANS. BOTH IN AGREEMENT WITH RESUMING [id number] [facility] [address]

## 2017-07-21 NOTE — H&P (VIEW-ONLY)
[facility]    Report of Consultation    Konrad De La Rosa Patient Status:  Inpatient    6/15/1935 MRN [id number]   Location [facility] 3W/SW Attending Juliana Anne MD   Hosp Day # 1 PCP Carissajasvir Mckenzie MD     Date of Admission:  7 mild diverticulosis / Internal hemorrhoid       Past Surgical History  Past Surgical History:  No date: BREAST SURGERY  6/18/2012: CARDIAC CATHETERIZATION  2009: HIP SURGERY Left      Comment: left femur fracture/pubic rami fractur  1985: HYSTERECTOMY (ARIMIDEX) tab 1 mg 1 mg Oral Daily   atorvastatin (LIPITOR) tab 40 mg 40 mg Oral Nightly   metoprolol Tartrate (LOPRESSOR) tab 25 mg 25 mg Oral BID   predniSONE (DELTASONE) tab 5 mg 5 mg Oral Daily   Pantoprazole Sodium (PROTONIX) EC tab 40 mg 40 mg Oral normal active BS, no tenderness on palpation, no masses or ascites, normal liver span/no organomegaly  EXTREMITIES: no calf tenderness  MUSCULOSKELETAL: no calf tenderness  PSYCH: normal affect, forgetful  NUT: well nourished appearing, no cachexia      Re hyponatremia, resolving. Chronically elevated troponin of unclear etiology, evaluated by cardiology with negative stress test and echocardiogram.    Recommendations:  Dr. Clemmie Runner spoke to daughter Daxa Sharpe about her abdominal symptoms and anemia.   Discussed col

## 2017-07-21 NOTE — OPERATIVE REPORT
ESOPHAGOGASTRODUODENOSCOPY REPORT    Patient Name:  Dorene Herrera Record #: [id number]  YOB: 1935  Date of Procedure: 7/21/2017    Referring physician: Merissa Jarrell MD    Surgeon:  Daniel May.  Na London MD    Pre-op diagnosis: Alondra Florentino

## 2017-07-21 NOTE — DISCHARGE PLANNING
7/21/17 CM Discharge planning / MDO to resume [facility]   Pt is current with [facility] advised pt will be discharging home later today. Dtr Genevieve Lea to stay with pt at discharge.    Alex Saenz [phone] [phone]

## 2017-07-21 NOTE — PHYSICAL THERAPY NOTE
PHYSICAL THERAPY EVALUATION - INPATIENT     Room Number: [facility] ENDO POOL ROOM/[facility] ENDO POOL ROOM  Evaluation Date: 7/21/2017  Type of Evaluation: Initial  Physician Order: PT Eval and Treat    Presenting Problem: NSTEMI, hyponatremia, weakness  Reason for Th • Breast cancer ([facility] [address] 3/26/2012    5/23/2003:  LEFT MASTECTOMY AND SENTINEL NODE   • Breast cancer of upper-inner quadrant of left female breast ([facility] 3/26/2012   • Herpes zoster 2003    shoulder, left   • Hip fracture, left (HCC)     left hip surgery Hard of hearing  Fall Risk: High fall risk    WEIGHT BEARING RESTRICTION  Weight Bearing Restriction: None              PAIN ASSESSMENT  Ratin        COGNITION  · Overall Cognitive Status:  WFL - within functional limits    RANGE OF MOTION AND STRENGTH (left toe out, limited hip extension BLE)        Bed Mobility: minimal assist    Transfers: minimal assist    Exercise/Education Provided:  Bed mobility  Functional activity tolerated  Gait training  ROM  Transfer training    Patient End of Session: In bed

## 2017-07-22 VITALS
HEART RATE: 90 BPM | DIASTOLIC BLOOD PRESSURE: 37 MMHG | TEMPERATURE: 98 F | HEIGHT: 62 IN | OXYGEN SATURATION: 96 % | WEIGHT: 124.5 LBS | BODY MASS INDEX: 22.91 KG/M2 | RESPIRATION RATE: 16 BRPM | SYSTOLIC BLOOD PRESSURE: 130 MMHG

## 2017-07-23 PROBLEM — B96.81 HELICOBACTER PYLORI GASTRITIS: Status: ACTIVE | Noted: 2017-07-23

## 2017-07-23 PROBLEM — K29.70 HELICOBACTER PYLORI GASTRITIS: Status: ACTIVE | Noted: 2017-07-23

## 2017-07-28 ENCOUNTER — LAB ENCOUNTER (OUTPATIENT)
Dept: LAB | Facility: HOSPITAL | Age: 82
End: 2017-07-28
Attending: INTERNAL MEDICINE
Payer: MEDICARE

## 2017-07-28 DIAGNOSIS — D64.9 ANEMIA, UNSPECIFIED: ICD-10-CM

## 2017-07-28 DIAGNOSIS — E87.1 HYPOSMOLALITY SYNDROME: Primary | ICD-10-CM

## 2017-07-28 LAB
ANION GAP SERPL CALC-SCNC: 10 MMOL/L (ref 0–18)
BASOPHILS # BLD: 0.1 K/UL (ref 0–0.2)
BASOPHILS NFR BLD: 1 %
BUN SERPL-MCNC: 21 MG/DL (ref 8–20)
BUN/CREAT SERPL: 18.1 (ref 10–20)
CALCIUM SERPL-MCNC: 9.1 MG/DL (ref 8.5–10.5)
CHLORIDE SERPL-SCNC: 101 MMOL/L (ref 95–110)
CO2 SERPL-SCNC: 22 MMOL/L (ref 22–32)
CREAT SERPL-MCNC: 1.16 MG/DL (ref 0.5–1.5)
EOSINOPHIL # BLD: 0.1 K/UL (ref 0–0.7)
EOSINOPHIL NFR BLD: 1 %
ERYTHROCYTE [DISTWIDTH] IN BLOOD BY AUTOMATED COUNT: 15.5 % (ref 11–15)
GLUCOSE SERPL-MCNC: 108 MG/DL (ref 70–99)
HCT VFR BLD AUTO: 29.9 % (ref 35–48)
HGB BLD-MCNC: 10 G/DL (ref 12–16)
LYMPHOCYTES # BLD: 0.6 K/UL (ref 1–4)
LYMPHOCYTES NFR BLD: 4 %
MCH RBC QN AUTO: 30.6 PG (ref 27–32)
MCHC RBC AUTO-ENTMCNC: 33.3 G/DL (ref 32–37)
MCV RBC AUTO: 91.9 FL (ref 80–100)
MONOCYTES # BLD: 0.8 K/UL (ref 0–1)
MONOCYTES NFR BLD: 5 %
NEUTROPHILS # BLD AUTO: 13.9 K/UL (ref 1.8–7.7)
NEUTROPHILS NFR BLD: 90 %
OSMOLALITY UR CALC.SUM OF ELEC: 280 MOSM/KG (ref 275–295)
PLATELET # BLD AUTO: 237 K/UL (ref 140–400)
PMV BLD AUTO: 8.3 FL (ref 7.4–10.3)
POTASSIUM SERPL-SCNC: 4.4 MMOL/L (ref 3.3–5.1)
RBC # BLD AUTO: 3.25 M/UL (ref 3.7–5.4)
SODIUM SERPL-SCNC: 133 MMOL/L (ref 136–144)
WBC # BLD AUTO: 15.5 K/UL (ref 4–11)

## 2017-07-28 PROCEDURE — 80048 BASIC METABOLIC PNL TOTAL CA: CPT

## 2017-07-28 PROCEDURE — 36415 COLL VENOUS BLD VENIPUNCTURE: CPT

## 2017-07-28 PROCEDURE — 85025 COMPLETE CBC W/AUTO DIFF WBC: CPT

## 2017-07-31 ENCOUNTER — OFFICE VISIT (OUTPATIENT)
Dept: AUDIOLOGY | Facility: CLINIC | Age: 82
End: 2017-07-31

## 2017-07-31 ENCOUNTER — OFFICE VISIT (OUTPATIENT)
Dept: OTOLARYNGOLOGY | Facility: CLINIC | Age: 82
End: 2017-07-31

## 2017-07-31 VITALS
BODY MASS INDEX: 23 KG/M2 | DIASTOLIC BLOOD PRESSURE: 69 MMHG | TEMPERATURE: 99 F | HEIGHT: 62 IN | WEIGHT: 125 LBS | SYSTOLIC BLOOD PRESSURE: 160 MMHG

## 2017-07-31 DIAGNOSIS — H90.3 SENSORINEURAL HEARING LOSS (SNHL) OF BOTH EARS: Primary | ICD-10-CM

## 2017-07-31 DIAGNOSIS — H90.3 SENSORINEURAL HEARING LOSS, BILATERAL: Primary | ICD-10-CM

## 2017-07-31 PROCEDURE — G0463 HOSPITAL OUTPT CLINIC VISIT: HCPCS | Performed by: OTOLARYNGOLOGY

## 2017-07-31 PROCEDURE — 99203 OFFICE O/P NEW LOW 30 MIN: CPT | Performed by: OTOLARYNGOLOGY

## 2017-07-31 PROCEDURE — 92567 TYMPANOMETRY: CPT | Performed by: AUDIOLOGIST

## 2017-07-31 PROCEDURE — 92557 COMPREHENSIVE HEARING TEST: CPT | Performed by: AUDIOLOGIST

## 2017-07-31 RX ORDER — HYDROCHLOROTHIAZIDE 12.5 MG/1
CAPSULE, GELATIN COATED ORAL
Refills: 4 | Status: ON HOLD | COMMUNITY
Start: 2017-05-19 | End: 2017-08-07

## 2017-07-31 NOTE — PROGRESS NOTES
Konrad De La Rosa is a [de-identified]year old female. Patient presents with:  Hearing Loss: bialteral, getting worse in the past 6 months     HPI:   Family feels that her hearing is decreased.  No pain or history of ear problesm    Current Outpatient Prescriptions:  Pan left femur, subtrochanteric with overriding and displacement of the fracture fragments, longitudinal nondisplaced fractures of the pubic rami superior and inferior   • Hyperlipidemia    • Osteopenia    • Pain    • Perforated peptic ulcer ([facility] [address] 1991   • Maico Left: Normal. Canal - Left: Normal. TM - Right: Normal, Left: Normal.  Cerumen cleared. Bilateral SNHL. ASSESSMENT AND PLAN:   1. Sensorineural hearing loss (SNHL) of both ears  Bilateral SNHL.  I discussed the hearing aid evaluation with her periodssh

## 2017-07-31 NOTE — PROGRESS NOTES
AUDIOGRAM     Mary Tinsley was referred for testing by Narinder Sands V.   6/15/1935  [id number]      Otoscopic inspection: right ear no cerumen; left ear no cerumen.        Tests/Procedures  Patient was tested via  standard insert earphones and a bone os

## 2017-08-07 ENCOUNTER — HOSPITAL ENCOUNTER (INPATIENT)
Facility: HOSPITAL | Age: 82
LOS: 3 days | Discharge: NURSING, REHAB OR LONG-TERM CARE | DRG: 641 | End: 2017-08-10
Attending: EMERGENCY MEDICINE | Admitting: INTERNAL MEDICINE
Payer: MEDICARE

## 2017-08-07 ENCOUNTER — APPOINTMENT (OUTPATIENT)
Dept: ULTRASOUND IMAGING | Facility: HOSPITAL | Age: 82
DRG: 641 | End: 2017-08-07
Attending: EMERGENCY MEDICINE
Payer: MEDICARE

## 2017-08-07 ENCOUNTER — APPOINTMENT (OUTPATIENT)
Dept: CT IMAGING | Facility: HOSPITAL | Age: 82
DRG: 641 | End: 2017-08-07
Attending: EMERGENCY MEDICINE
Payer: MEDICARE

## 2017-08-07 DIAGNOSIS — R10.13 EPIGASTRIC PAIN: ICD-10-CM

## 2017-08-07 DIAGNOSIS — E87.1 HYPONATREMIA: Primary | ICD-10-CM

## 2017-08-07 LAB
ALBUMIN SERPL BCP-MCNC: 3.5 G/DL (ref 3.5–4.8)
ALP SERPL-CCNC: 77 U/L (ref 32–100)
ALT SERPL-CCNC: 19 U/L (ref 14–54)
ANION GAP SERPL CALC-SCNC: 9 MMOL/L (ref 0–18)
AST SERPL-CCNC: 22 U/L (ref 15–41)
BASOPHILS # BLD: 0.1 K/UL (ref 0–0.2)
BASOPHILS NFR BLD: 1 %
BILIRUB DIRECT SERPL-MCNC: 0.1 MG/DL (ref 0–0.2)
BILIRUB SERPL-MCNC: 0.5 MG/DL (ref 0.3–1.2)
BILIRUB UR QL: NEGATIVE
BUN SERPL-MCNC: 19 MG/DL (ref 8–20)
BUN/CREAT SERPL: 18.1 (ref 10–20)
CALCIUM SERPL-MCNC: 8.9 MG/DL (ref 8.5–10.5)
CHLORIDE SERPL-SCNC: 95 MMOL/L (ref 95–110)
CLARITY UR: CLEAR
CO2 SERPL-SCNC: 22 MMOL/L (ref 22–32)
COLOR UR: YELLOW
CREAT SERPL-MCNC: 1.05 MG/DL (ref 0.5–1.5)
D DIMER PPP FEU-MCNC: 0.91 MCG/ML (ref ?–0.8)
EOSINOPHIL # BLD: 0 K/UL (ref 0–0.7)
EOSINOPHIL NFR BLD: 0 %
ERYTHROCYTE [DISTWIDTH] IN BLOOD BY AUTOMATED COUNT: 15.6 % (ref 11–15)
GLUCOSE SERPL-MCNC: 127 MG/DL (ref 70–99)
GLUCOSE UR-MCNC: NEGATIVE MG/DL
HCT VFR BLD AUTO: 27.8 % (ref 35–48)
HGB BLD-MCNC: 9.4 G/DL (ref 12–16)
HGB UR QL STRIP.AUTO: NEGATIVE
KETONES UR-MCNC: NEGATIVE MG/DL
LEUKOCYTE ESTERASE UR QL STRIP.AUTO: NEGATIVE
LIPASE SERPL-CCNC: 33 U/L (ref 22–51)
LYMPHOCYTES # BLD: 0.4 K/UL (ref 1–4)
LYMPHOCYTES NFR BLD: 5 %
MCH RBC QN AUTO: 30.9 PG (ref 27–32)
MCHC RBC AUTO-ENTMCNC: 33.9 G/DL (ref 32–37)
MCV RBC AUTO: 91 FL (ref 80–100)
MONOCYTES # BLD: 0.4 K/UL (ref 0–1)
MONOCYTES NFR BLD: 5 %
NEUTROPHILS # BLD AUTO: 8 K/UL (ref 1.8–7.7)
NEUTROPHILS NFR BLD: 89 %
NITRITE UR QL STRIP.AUTO: NEGATIVE
OSMOLALITY UR CALC.SUM OF ELEC: 266 MOSM/KG (ref 275–295)
PH UR: 7 [PH] (ref 5–8)
PLATELET # BLD AUTO: 197 K/UL (ref 140–400)
PMV BLD AUTO: 8.2 FL (ref 7.4–10.3)
POTASSIUM SERPL-SCNC: 4.6 MMOL/L (ref 3.3–5.1)
PROT SERPL-MCNC: 6.1 G/DL (ref 5.9–8.4)
PROT UR-MCNC: NEGATIVE MG/DL
RBC # BLD AUTO: 3.06 M/UL (ref 3.7–5.4)
SODIUM SERPL-SCNC: 126 MMOL/L (ref 136–144)
SP GR UR STRIP: 1.01 (ref 1–1.03)
UROBILINOGEN UR STRIP-ACNC: <2
VIT C UR-MCNC: NEGATIVE MG/DL
WBC # BLD AUTO: 8.9 K/UL (ref 4–11)

## 2017-08-07 PROCEDURE — 80048 BASIC METABOLIC PNL TOTAL CA: CPT | Performed by: EMERGENCY MEDICINE

## 2017-08-07 PROCEDURE — 81003 URINALYSIS AUTO W/O SCOPE: CPT | Performed by: EMERGENCY MEDICINE

## 2017-08-07 PROCEDURE — 83690 ASSAY OF LIPASE: CPT | Performed by: EMERGENCY MEDICINE

## 2017-08-07 PROCEDURE — 96360 HYDRATION IV INFUSION INIT: CPT

## 2017-08-07 PROCEDURE — 85379 FIBRIN DEGRADATION QUANT: CPT | Performed by: EMERGENCY MEDICINE

## 2017-08-07 PROCEDURE — 93971 EXTREMITY STUDY: CPT | Performed by: EMERGENCY MEDICINE

## 2017-08-07 PROCEDURE — 96361 HYDRATE IV INFUSION ADD-ON: CPT

## 2017-08-07 PROCEDURE — 99285 EMERGENCY DEPT VISIT HI MDM: CPT

## 2017-08-07 PROCEDURE — 85025 COMPLETE CBC W/AUTO DIFF WBC: CPT | Performed by: EMERGENCY MEDICINE

## 2017-08-07 PROCEDURE — 71260 CT THORAX DX C+: CPT | Performed by: EMERGENCY MEDICINE

## 2017-08-07 PROCEDURE — 80076 HEPATIC FUNCTION PANEL: CPT | Performed by: EMERGENCY MEDICINE

## 2017-08-07 RX ORDER — LEVOFLOXACIN 500 MG/1
500 TABLET, FILM COATED ORAL DAILY
Status: DISCONTINUED | OUTPATIENT
Start: 2017-08-07 | End: 2017-08-07

## 2017-08-07 RX ORDER — AMOXICILLIN 500 MG/1
1000 CAPSULE ORAL EVERY 12 HOURS
Status: DISCONTINUED | OUTPATIENT
Start: 2017-08-07 | End: 2017-08-10

## 2017-08-07 RX ORDER — ANASTROZOLE 1 MG/1
1 TABLET ORAL DAILY
Status: DISCONTINUED | OUTPATIENT
Start: 2017-08-08 | End: 2017-08-10

## 2017-08-07 RX ORDER — ATORVASTATIN CALCIUM 40 MG/1
40 TABLET, FILM COATED ORAL NIGHTLY
Status: DISCONTINUED | OUTPATIENT
Start: 2017-08-07 | End: 2017-08-10

## 2017-08-07 RX ORDER — MELATONIN
325 SEE ADMIN INSTRUCTIONS
Status: DISCONTINUED | OUTPATIENT
Start: 2017-08-07 | End: 2017-08-10

## 2017-08-07 RX ORDER — PANTOPRAZOLE SODIUM 40 MG/1
40 TABLET, DELAYED RELEASE ORAL
Status: DISCONTINUED | OUTPATIENT
Start: 2017-08-07 | End: 2017-08-10

## 2017-08-07 RX ORDER — LEVOFLOXACIN 750 MG/1
750 TABLET ORAL
Status: DISCONTINUED | OUTPATIENT
Start: 2017-08-09 | End: 2017-08-10

## 2017-08-07 RX ORDER — LISINOPRIL 10 MG/1
10 TABLET ORAL DAILY
Status: DISCONTINUED | OUTPATIENT
Start: 2017-08-07 | End: 2017-08-10

## 2017-08-07 RX ORDER — SODIUM CHLORIDE 9 MG/ML
INJECTION, SOLUTION INTRAVENOUS CONTINUOUS
Status: DISCONTINUED | OUTPATIENT
Start: 2017-08-07 | End: 2017-08-10

## 2017-08-07 RX ORDER — ANASTROZOLE 1 MG/1
1 TABLET ORAL DAILY
Status: DISCONTINUED | OUTPATIENT
Start: 2017-08-07 | End: 2017-08-07

## 2017-08-07 RX ORDER — ASPIRIN 81 MG/1
81 TABLET, CHEWABLE ORAL DAILY
Status: DISCONTINUED | OUTPATIENT
Start: 2017-08-07 | End: 2017-08-10

## 2017-08-07 RX ORDER — PREDNISONE 1 MG/1
5 TABLET ORAL DAILY
Status: DISCONTINUED | OUTPATIENT
Start: 2017-08-07 | End: 2017-08-10

## 2017-08-07 RX ORDER — SODIUM CHLORIDE 9 MG/ML
1000 INJECTION, SOLUTION INTRAVENOUS ONCE
Status: COMPLETED | OUTPATIENT
Start: 2017-08-07 | End: 2017-08-07

## 2017-08-07 NOTE — ED INITIAL ASSESSMENT (HPI)
Pt presents to ED via EMS for c/o intermittent abd pain for the past \"couple of weeks\". Pt states taking abx this past wek for \"bacrial infection in my stomach\". Last abx taken 4 days ago, pt unable o tolerate med stated \"it made me sick\".  Denies fev

## 2017-08-07 NOTE — PROGRESS NOTES
[facility] Pharmacy Note:  Renal Adjustment for Levaquin (levofloxacin)    Anaya Laureano is a [de-identified]year old female who has been prescribed Levaquin (levofloxacin) 500 mg every 24 hrs.   CrCl is estimated creatinine clearance is 33.8 mL/min (based on SCr of 1.05

## 2017-08-07 NOTE — ED PROVIDER NOTES
Patient Seen in: [facility] AND [facility] Emergency Department    History   Patient presents with:  Abdominal Pain    Stated Complaint: abd pain    HPI    73-year-old female with multiple medical problems presents for evaluation of abdominal pain.   Patient repo Comment: LEFT MASTECTOMY AND SENTINEL NODE  11/23/2004: MASTECTOMY RIGHT Right      Comment: RIGHT MASTECTOMY  AND SENTINEL NODE    Medications :   hydrochlorothiazide 12.5 MG Oral Cap,     Pantoprazole Sodium 40 MG Oral Tab EC,  Take 1 tablet (40 mg tota 157.5 cm (5' 2\")   Wt 54.4 kg   SpO2 100%   BMI 21.95 kg/m²         Physical Exam   Constitutional: She is oriented to person, place, and time. She appears well-developed and well-nourished. No distress. HENT:   Head: Normocephalic and atraumatic.    Nuzhat incidental findings as above.           Radiology exams  Viewed and reviewed by myself and findings discussed with patient including need for follow up        ED Course     Labs Reviewed   BASIC METABOLIC PANEL (8) - Abnormal; Notable for the following: with and admitted to Dr. Costa Do. Patient and her daughter updated at the bedside.     Disposition and Plan     Clinical Impression:  Hyponatremia  (primary encounter diagnosis)  Epigastric pain    Disposition:  There is no disposition on file for this visi

## 2017-08-07 NOTE — CONSULTS
[facility]    Report of Consultation    [facility] MARIO Tinsley Patient Status:  Inpatient    6/15/1935 MRN [id number]   Location [facility] 1SW Attending Kristel Herrera MD   Hosp Day # 0 PCP Ivan Hayes MD     Date of Admission:   Left      Comment: left femur fracture/pubic rami fractur  1985: HYSTERECTOMY  5/23/2003: MASTECTOMY LEFT Left      Comment: LEFT MASTECTOMY AND SENTINEL NODE  11/23/2004: MASTECTOMY RIGHT Right      Comment: RIGHT MASTECTOMY  AND SENTINEL NODE    Family H Allergies  No Known Allergies    Review of Systems:    Pertinent items are noted in HPI. Physical Exam:   Blood pressure 151/58, pulse 79, temperature 97.9 °F (36.6 °C), temperature source Oral, resp.  rate 16, height 5' 2\" (1.575 m), weight 123 l suggest benignity. 4. Extensive atherosclerotic disease involving the aortic arch and descending thoracic aorta with multifocal ulceration, similar to slightly progressive since June, 2012. 5. Lesser incidental findings as above.              Impression:

## 2017-08-08 ENCOUNTER — APPOINTMENT (OUTPATIENT)
Dept: ULTRASOUND IMAGING | Facility: HOSPITAL | Age: 82
DRG: 641 | End: 2017-08-08
Attending: INTERNAL MEDICINE
Payer: MEDICARE

## 2017-08-08 PROBLEM — A04.8 H. PYLORI INFECTION: Status: ACTIVE | Noted: 2017-08-08

## 2017-08-08 PROBLEM — F41.9 ANXIETY: Status: ACTIVE | Noted: 2017-08-08

## 2017-08-08 PROBLEM — E43 SEVERE MALNUTRITION (HCC): Status: ACTIVE | Noted: 2017-08-08

## 2017-08-08 PROBLEM — R13.10 DYSPHAGIA: Status: ACTIVE | Noted: 2017-08-08

## 2017-08-08 LAB
ALBUMIN SERPL BCP-MCNC: 2.8 G/DL (ref 3.5–4.8)
ALBUMIN/GLOB SERPL: 1.3 {RATIO} (ref 1–2)
ALP SERPL-CCNC: 61 U/L (ref 32–100)
ALT SERPL-CCNC: 15 U/L (ref 14–54)
ANION GAP SERPL CALC-SCNC: 6 MMOL/L (ref 0–18)
AST SERPL-CCNC: 19 U/L (ref 15–41)
BASOPHILS # BLD: 0.1 K/UL (ref 0–0.2)
BASOPHILS NFR BLD: 1 %
BILIRUB SERPL-MCNC: 0.4 MG/DL (ref 0.3–1.2)
BUN SERPL-MCNC: 17 MG/DL (ref 8–20)
BUN/CREAT SERPL: 16.3 (ref 10–20)
CALCIUM SERPL-MCNC: 8.2 MG/DL (ref 8.5–10.5)
CHLORIDE SERPL-SCNC: 104 MMOL/L (ref 95–110)
CO2 SERPL-SCNC: 22 MMOL/L (ref 22–32)
CREAT SERPL-MCNC: 1.04 MG/DL (ref 0.5–1.5)
EOSINOPHIL # BLD: 0.2 K/UL (ref 0–0.7)
EOSINOPHIL NFR BLD: 3 %
ERYTHROCYTE [DISTWIDTH] IN BLOOD BY AUTOMATED COUNT: 16 % (ref 11–15)
GLOBULIN PLAS-MCNC: 2.2 G/DL (ref 2.5–3.7)
GLUCOSE SERPL-MCNC: 91 MG/DL (ref 70–99)
HCT VFR BLD AUTO: 27.3 % (ref 35–48)
HGB BLD-MCNC: 9 G/DL (ref 12–16)
LYMPHOCYTES # BLD: 0.9 K/UL (ref 1–4)
LYMPHOCYTES NFR BLD: 12 %
MCH RBC QN AUTO: 30.4 PG (ref 27–32)
MCHC RBC AUTO-ENTMCNC: 33.1 G/DL (ref 32–37)
MCV RBC AUTO: 91.9 FL (ref 80–100)
MONOCYTES # BLD: 0.7 K/UL (ref 0–1)
MONOCYTES NFR BLD: 9 %
NEUTROPHILS # BLD AUTO: 5.9 K/UL (ref 1.8–7.7)
NEUTROPHILS NFR BLD: 75 %
OSMOLALITY UR CALC.SUM OF ELEC: 275 MOSM/KG (ref 275–295)
PLATELET # BLD AUTO: 174 K/UL (ref 140–400)
PMV BLD AUTO: 8 FL (ref 7.4–10.3)
POTASSIUM SERPL-SCNC: 4.1 MMOL/L (ref 3.3–5.1)
PROT SERPL-MCNC: 5 G/DL (ref 5.9–8.4)
RBC # BLD AUTO: 2.97 M/UL (ref 3.7–5.4)
SODIUM SERPL-SCNC: 132 MMOL/L (ref 136–144)
WBC # BLD AUTO: 7.9 K/UL (ref 4–11)

## 2017-08-08 PROCEDURE — 85025 COMPLETE CBC W/AUTO DIFF WBC: CPT | Performed by: INTERNAL MEDICINE

## 2017-08-08 PROCEDURE — 93880 EXTRACRANIAL BILAT STUDY: CPT | Performed by: INTERNAL MEDICINE

## 2017-08-08 PROCEDURE — 92610 EVALUATE SWALLOWING FUNCTION: CPT

## 2017-08-08 PROCEDURE — 97532 HC OT THERAP INTERV FOCUS COGNITIVE FUNCTION DIRECT PT CONTACT INITIAL 15 MIN: CPT

## 2017-08-08 PROCEDURE — 97165 OT EVAL LOW COMPLEX 30 MIN: CPT

## 2017-08-08 PROCEDURE — 80053 COMPREHEN METABOLIC PANEL: CPT | Performed by: INTERNAL MEDICINE

## 2017-08-08 PROCEDURE — 97162 PT EVAL MOD COMPLEX 30 MIN: CPT

## 2017-08-08 RX ORDER — ALPRAZOLAM 0.25 MG/1
0.25 TABLET ORAL EVERY 4 HOURS PRN
Status: DISCONTINUED | OUTPATIENT
Start: 2017-08-08 | End: 2017-08-10

## 2017-08-08 NOTE — PROGRESS NOTES
[place] [facility]    Progress Note    [facility] Patient Status:  Inpatient    6/15/1935 MRN [id number]   Location [facility] 5SW/SE Attending Skye Melgoza MD   Hosp Day # 1 PCP Octavio Astudillo MD       Subjective:    Majo Manuel ALKPHO  77  61   AST  22  19   ALT  19  15   BILT  0.5  0.4   TP  6.1  5.0*       Recent Labs   Lab  08/07/17   1015   LIP  33       No results for input(s): TROP, CK in the last 72 hours. No results for input(s): PT, INR in the last 72 hours.       Us

## 2017-08-08 NOTE — PHYSICAL THERAPY NOTE
PHYSICAL THERAPY EVALUATION - INPATIENT     Room Number: 570/570-A  Evaluation Date: 8/8/2017  Type of Evaluation: Initial   Physician Order: PT Eval and Treat    Presenting Problem:  (hyponatremia, H pylori with nausea, diarrhea and vomiting)  Reason Hyponatremia  Active Problems:    Epigastric pain    Dysphagia    Anxiety    H. pylori infection      Past Medical History  Past Medical History:   Diagnosis Date   • Anemia    • Breast cancer ([facility] [address] 3/26/2012    5/23/2003:  LEFT MASTECTOMY AND SENTINEL NO chronic pain L hip, occasional pain in upper back  Management Techniques: Activity promotion    COGNITION  · A and O x3 given extra time to process request at times. [place].      RANGE OF MOTION AND STRENGTH ASSESSMENT  Upper extremity ROM and strength are wit Transfers: CGA from bed and chair. Exercise/Education Provided:  Benefits of activity, get up 3x/day with staff, call bell to alert staff of need to get up.      Patient End of Session: Up in chair;Needs met;Call light within reach;RN aware of normasi

## 2017-08-08 NOTE — SLP NOTE
ADULT SWALLOWING EVALUATION    ASSESSMENT & PLAN   ASSESSMENT  Pt seen for a clinical swallow evaluation secondary to family report of occasional coughing at home when eating solids. The pt was seen at bedside while sitting upright in a chair.   The pt's d Undetermined    HISTORY   MEDICAL HISTORY  Reason for Referral: R/O aspiration    Problem List  Principal Problem:    Hyponatremia  Active Problems:    Epigastric pain    Dysphagia    Anxiety    H. pylori infection      Past Medical History  Past Medical H 2012.  5. Lesser incidental findings as above. OBJECTIVE   ORAL MOTOR EXAMINATION  Dentition:  (Pt with no upper dentition)  Symmetry: Within Functional Limits  Strength:  Within Functional Limits  Tone: Within Functional Limits  Range of Motion: Within Date: 08/09/17    Thank you for your referral.   If you have any questions, please contact [address] [place] [address] [address] MS/CCC-SLP  Speech Language Pathologist  [facility] [place]  EXT.  [phone]

## 2017-08-08 NOTE — H&P
[facility]    PATIENT'S NAME: [facility]   ATTENDING PHYSICIAN: Brenden Johnson MD   PATIENT ACCOUNT#:   [de-identified]    LOCATION:  [address] [place] [address] RECORD #:   [id number]       YOB: 1935  ADMISSION DATE:       08/07/2 failed left hip surgery repair by Dr. Roseanne Bardales 02/02/2009, and right mastectomy in 2004. She had a colonoscopy in 2005 and an EGD in August 2017.     MEDICATIONS:  On admission, aspirin 81 mg daily, Lipitor 40 mg nightly, vitamin D 1000 international units d and oriented x3. Cranial nerves II through XII are intact. Motor exam reveals +5/5 strength to the upper and lower extremities bilaterally. Deep tendon reflexes are +2 to 3 throughout. Babinski's are downgoing. IMPRESSION AND PLAN:    1.    Hyponatre

## 2017-08-08 NOTE — PROGRESS NOTES
[place] [facility] - [facility]    Progress Note    [facility] Patient Status:  Inpatient    6/15/1935 MRN [id number]   Location [facility] 5SW/SE Attending Maria Luisa Hdez MD   Hosp Day # 0 PCP Le Mcguire MD       Subjective:    Mis Herring TP  6.1       Recent Labs   Lab  08/07/17   1015   LIP  33       No results for input(s): TROP, CK in the last 72 hours. No results for input(s): PT, INR in the last 72 hours.       Us Venous Doppler Leg Left - Diag Img (cpt=93971)    Result Date: 8/7/

## 2017-08-08 NOTE — PROGRESS NOTES
08/08/17 1814   Clinical Encounter Type   Visited With Patient   Routine Visit Introduction   Continue Visiting Yes   Patient Spiritual Encounters   Spiritual Needs Patient reported no needs at this time   Spiritual Interventions  provided empat

## 2017-08-08 NOTE — OCCUPATIONAL THERAPY NOTE
OCCUPATIONAL THERAPY EVALUATION - INPATIENT     Room Number: 570/570-A  Evaluation Date: 8/8/2017  Type of Evaluation: Initial  Presenting Problem:  (Abdominal Pain)    Physician Order: IP Consult to Occupational Therapy  Reason for Therapy: ADL/IADL Dysfu surgery   • Hip fracture, left ([facility] [address]     fall - 12/4/2008:  intramedullary femoral nailing / subsequent x-rays transverve fracture of the proximal left femur, subtrochanteric with overriding and displacement of the fracture fragments, longitudinal nondispl functional limits     STRENGTH ASSESSMENT  Upper extremity strength is within functional limits     COORDINATION  Gross Motor: WFL   Fine Motor: WFL     ACTIVITIES OF DAILY LIVING ASSESSMENT  AM-PAC ‘6-Clicks’ Inpatient Daily Activity Short Form  How much

## 2017-08-08 NOTE — PLAN OF CARE
Problem: Patient/Family Goals  Goal: Patient/Family Long Term Goal  Patient's Long Term Goal: to be discharged to appropriate level of care    Interventions:  - SW on consult  - See additional Care Plan goals for specific interventions   Outcome: Progressi

## 2017-08-08 NOTE — PROGRESS NOTES
[place] [facility] - [facility]    Progress Note    [facility] Patient Status:  Inpatient    6/15/1935 MRN [id number]   Location [facility] 5SW/SE Attending Rickie Milian MD   Hosp Day # 1 PCP Charly Lan MD       Subjective:    Xavier Brandt extremity. Ct Chest Pain/pe (iv Only) Em    Result Date: 8/7/2017  CONCLUSION:  1. No evidence of acute pulmonary embolism to the level of the first order subsegmental pulmonary artery branches.  2. Mild dependent subsegmental atelectasis bilaterall

## 2017-08-08 NOTE — CM/SW NOTE
CTL spoke with patient and daughter at bedside, Patient lives alone and was having [facility] Rn and PT. Patients daughter verbalized some concerns with residential [facility].  Spoke with CARRILLO English for Residential who will speak to the patient and marnie

## 2017-08-08 NOTE — PROGRESS NOTES
[address] [facility]    CDI Prediction Tool Protocol (Vancomycin Initiated)    OVP (oral vancomycin prophylaxis) 125 mg PO BID is being started in this patient based on a score of 15.   This patient is currently at high risk for developing CDI due to his/h

## 2017-08-09 LAB
ALBUMIN SERPL BCP-MCNC: 2.6 G/DL (ref 3.5–4.8)
ANION GAP SERPL CALC-SCNC: 3 MMOL/L (ref 0–18)
BUN SERPL-MCNC: 19 MG/DL (ref 8–20)
BUN/CREAT SERPL: 18.1 (ref 10–20)
C DIFF TOX B STL QL: NEGATIVE
CALCIUM SERPL-MCNC: 7.9 MG/DL (ref 8.5–10.5)
CHLORIDE SERPL-SCNC: 111 MMOL/L (ref 95–110)
CO2 SERPL-SCNC: 21 MMOL/L (ref 22–32)
CREAT SERPL-MCNC: 1.05 MG/DL (ref 0.5–1.5)
GLUCOSE SERPL-MCNC: 105 MG/DL (ref 70–99)
OSMOLALITY UR CALC.SUM OF ELEC: 283 MOSM/KG (ref 275–295)
PHOSPHATE SERPL-MCNC: 2.5 MG/DL (ref 2.4–4.7)
POTASSIUM SERPL-SCNC: 4.1 MMOL/L (ref 3.3–5.1)
SODIUM SERPL-SCNC: 135 MMOL/L (ref 136–144)

## 2017-08-09 PROCEDURE — 80069 RENAL FUNCTION PANEL: CPT | Performed by: INTERNAL MEDICINE

## 2017-08-09 PROCEDURE — 87493 C DIFF AMPLIFIED PROBE: CPT

## 2017-08-09 PROCEDURE — 97116 GAIT TRAINING THERAPY: CPT

## 2017-08-09 RX ORDER — CITALOPRAM 20 MG/1
10 TABLET ORAL DAILY
Status: DISCONTINUED | OUTPATIENT
Start: 2017-08-09 | End: 2017-08-10

## 2017-08-09 NOTE — RESTORATIVE THERAPY
RESTORATIVE CARE TREATMENT NOTE    Presenting Problem  Presenting Problem:  (hyponatremia, H pylori with nausea, diarrhea and vomiting)  Presenting Problem:  (Abdominal Pain)  Presenting Problem: Swallow    Precautions  Precautions: Aspiration (NPO pending

## 2017-08-09 NOTE — PHYSICAL THERAPY NOTE
PHYSICAL THERAPY TREATMENT NOTE - INPATIENT    Room Number: 570/570-A       Presenting Problem:  (hyponatremia, H pylori with nausea, diarrhea and vomiting)    Problem List  Principal Problem:    Hyponatremia  Active Problems:    Epigastric pain    Dyspha Turning over in bed (including adjusting bedclothes, sheets and blankets)?: A Little   -   Sitting down on and standing up from a chair with arms (e.g., wheelchair, bedside commode, etc.): A Little   -   Moving from lying on back to sitting on the side of

## 2017-08-09 NOTE — DIETARY NOTE
ADULT NUTRITION INITIAL ASSESSMENT    Pt is at moderate nutrition risk. Pt does not meet malnutrition criteria. RECOMMENDATIONS TO MD:  continue present nutrition management.      NUTRITION DIAGNOSIS/PROBLEM:  Altered GI function related to dysphagi a oz)  07/31/17 : 56.7 kg (125 lb)  07/21/17 : 56.5 kg (124 lb 8 oz)  06/09/17 : 55.4 kg (122 lb 3.2 oz)  07/14/16 : 55.8 kg (123 lb)  12/26/14 : 58.3 kg (128 lb 9.6 oz)    GASTROINTESTINAL PROBLEMS: dysphagia and Swallow eval noted for mild oral dysphagia.

## 2017-08-09 NOTE — PLAN OF CARE
Problem: Patient/Family Goals  Goal: Patient/Family Long Term Goal  Patient's Long Term Goal: to be discharged to appropriate level of care    Interventions:  - SW on consult  - See additional Care Plan goals for specific interventions    Outcome: Progress

## 2017-08-09 NOTE — PROGRESS NOTES
[facility]    Progress Note    [facility] Patient Status:  Inpatient    6/15/1935 MRN [id number]   Location [facility] 5SW/SE Attending Yary Jiménez MD   Hosp Day # 2 PCP Tomy Rothman MD       Subjective:    Anh Cassidy 2.6*   NA  126*  132*  135*   K  4.6  4.1  4.1   CL  95  104  111*   CO2  22  22  21*   ALKPHO  77  61   --    AST  22  19   --    ALT  19  15   --    BILT  0.5  0.4   --    TP  6.1  5.0*   --        Recent Labs   Lab  08/07/17   1015   LIP  33       No re MD  8/9/2017

## 2017-08-09 NOTE — PLAN OF CARE
Pt  IV infiltrated with heparin infusing, pt refused at this time to let this nurse to inserted another line at this time because he needs to sleep, physician notify about heparin has been stopped. at 1500

## 2017-08-10 VITALS
BODY MASS INDEX: 22.82 KG/M2 | SYSTOLIC BLOOD PRESSURE: 165 MMHG | WEIGHT: 124 LBS | OXYGEN SATURATION: 100 % | HEART RATE: 78 BPM | RESPIRATION RATE: 18 BRPM | TEMPERATURE: 97 F | DIASTOLIC BLOOD PRESSURE: 59 MMHG | HEIGHT: 62 IN

## 2017-08-10 PROBLEM — E87.1 HYPONATREMIA: Status: RESOLVED | Noted: 2017-06-06 | Resolved: 2017-08-10

## 2017-08-10 PROCEDURE — 92526 ORAL FUNCTION THERAPY: CPT

## 2017-08-10 RX ORDER — AMOXICILLIN 500 MG/1
1000 CAPSULE ORAL EVERY 12 HOURS
Qty: 28 CAPSULE | Refills: 0 | Status: ON HOLD | OUTPATIENT
Start: 2017-08-10 | End: 2017-12-03 | Stop reason: ALTCHOICE

## 2017-08-10 RX ORDER — MELATONIN
325 SEE ADMIN INSTRUCTIONS
Qty: 30 TABLET | Refills: 5 | Status: ON HOLD | OUTPATIENT
Start: 2017-08-10 | End: 2017-12-08

## 2017-08-10 RX ORDER — LEVOFLOXACIN 750 MG/1
750 TABLET ORAL
Qty: 7 TABLET | Refills: 0 | Status: ON HOLD | OUTPATIENT
Start: 2017-08-10 | End: 2017-12-03 | Stop reason: ALTCHOICE

## 2017-08-10 RX ORDER — ALPRAZOLAM 0.25 MG/1
0.25 TABLET ORAL EVERY 4 HOURS PRN
Qty: 30 TABLET | Refills: 0 | Status: SHIPPED | OUTPATIENT
Start: 2017-08-10 | End: 2018-07-02 | Stop reason: ALTCHOICE

## 2017-08-10 RX ORDER — PANTOPRAZOLE SODIUM 40 MG/1
40 TABLET, DELAYED RELEASE ORAL
Qty: 60 TABLET | Refills: 0 | Status: SHIPPED | OUTPATIENT
Start: 2017-08-10 | End: 2020-08-20 | Stop reason: ALTCHOICE

## 2017-08-10 RX ORDER — CITALOPRAM 10 MG/1
10 TABLET ORAL DAILY
Qty: 30 TABLET | Refills: 5 | Status: SHIPPED | OUTPATIENT
Start: 2017-08-10

## 2017-08-10 NOTE — PROGRESS NOTES
[facility]    Progress Note    [facility] Patient Status:  Inpatient    6/15/1935 MRN [id number]   Location [facility] 5SW/SE Attending Jatin Trejo MD   Hosp Day # 3 PCP Aliyah Renner MD       Subjective:    Diego Reilly 22  22  21*   ALKPHO  77  61   --    AST  22  19   --    ALT  19  15   --    BILT  0.5  0.4   --    TP  6.1  5.0*   --        Recent Labs   Lab  08/07/17   1015   LIP  33       No results for input(s): TROP, CK in the last 72 hours.     No results for input

## 2017-08-10 NOTE — PLAN OF CARE
METABOLIC/FLUID AND ELECTROLYTES - ADULT    • Electrolytes maintained within normal limits Adequate for Discharge        PAIN - ADULT    • Verbalizes/displays adequate comfort level or patient's stated pain goal Adequate for Discharge        Patient/Family

## 2017-08-10 NOTE — SLP NOTE
SPEECH DAILY NOTE - INPATIENT    Evaluation Date: 08/8/17    ASSESSMENT & PLAN   ASSESSMENT  Pt seen for swallowing therapy to monitor swallowing tolerance and train swallowing precautions.   Education on swallowing precautions provided to pt and her daught RN    GOALS  GOALS  Goal #1 The patient will tolerate mechanical soft-chopped consistency and thin liquids without overt signs or symptoms of aspiration with 100 % accuracy over 3 session(s).     Pt tolerated po trials of mechanical soft chopped and thin li

## 2017-08-24 NOTE — DISCHARGE SUMMARY
[facility]    PATIENT'S NAME: Sy CLADERON   ATTENDING PHYSICIAN: Cecilio Magdaleno MD   PATIENT ACCOUNT#:   [id number]    LOCATION:  [address] [place] [address] #:   [id number]       YOB: 1935  ADMISSION DATE:       07/18/20 that patient's troponins were chronically elevated. She had a normal stress test 1 month ago. DISCHARGE DIET, ACTIVITY, AND MEDICATIONS:  See discharge instructions. PROCEDURES AND DATE:  Esophagogastroduodenoscopy by Dr. Misty Ayoub on 07/21/2017.   Ant

## 2017-08-31 PROBLEM — D64.9 ANEMIA, UNSPECIFIED TYPE: Status: ACTIVE | Noted: 2017-08-31

## 2017-09-02 NOTE — DISCHARGE SUMMARY
[facility]    PATIENT'S NAME: Fredy Walters [facility]   ATTENDING PHYSICIAN: Aleah Ricardo MD   PATIENT ACCOUNT#:   [de-identified]    LOCATION:  [address] [place] [address] RECORD #:   [id number]       YOB: 1935  ADMISSION DATE:       08/07/2 lisinopril 10 mg daily, atorvastatin 40 mg nightly, Lopressor 25 mg b.i.d., prednisone 5 mg daily, vitamin D3 1000 international units daily. Ferrous sulfate 325 mg p.o. twice weekly was discontinued.     DISCHARGE INSTRUCTIONS:  Patient is to follow up wi

## 2017-09-05 ENCOUNTER — LAB ENCOUNTER (OUTPATIENT)
Dept: LAB | Facility: HOSPITAL | Age: 82
End: 2017-09-05
Attending: INTERNAL MEDICINE
Payer: MEDICARE

## 2017-09-05 DIAGNOSIS — R53.83 FATIGUE: Primary | ICD-10-CM

## 2017-09-05 LAB
ANION GAP SERPL CALC-SCNC: 9 MMOL/L (ref 0–18)
BASOPHILS # BLD: 0.1 K/UL (ref 0–0.2)
BASOPHILS NFR BLD: 1 %
BUN SERPL-MCNC: 37 MG/DL (ref 8–20)
BUN/CREAT SERPL: 27.8 (ref 10–20)
CALCIUM SERPL-MCNC: 9 MG/DL (ref 8.5–10.5)
CHLORIDE SERPL-SCNC: 108 MMOL/L (ref 95–110)
CO2 SERPL-SCNC: 20 MMOL/L (ref 22–32)
CREAT SERPL-MCNC: 1.33 MG/DL (ref 0.5–1.5)
EOSINOPHIL # BLD: 0.1 K/UL (ref 0–0.7)
EOSINOPHIL NFR BLD: 1 %
ERYTHROCYTE [DISTWIDTH] IN BLOOD BY AUTOMATED COUNT: 15.9 % (ref 11–15)
GLUCOSE SERPL-MCNC: 143 MG/DL (ref 70–99)
HCT VFR BLD AUTO: 31.2 % (ref 35–48)
HGB BLD-MCNC: 10.2 G/DL (ref 12–16)
LYMPHOCYTES # BLD: 0.4 K/UL (ref 1–4)
LYMPHOCYTES NFR BLD: 4 %
MCH RBC QN AUTO: 30.8 PG (ref 27–32)
MCHC RBC AUTO-ENTMCNC: 32.6 G/DL (ref 32–37)
MCV RBC AUTO: 94.6 FL (ref 80–100)
MONOCYTES # BLD: 0.6 K/UL (ref 0–1)
MONOCYTES NFR BLD: 6 %
NEUTROPHILS # BLD AUTO: 8.9 K/UL (ref 1.8–7.7)
NEUTROPHILS NFR BLD: 89 %
OSMOLALITY UR CALC.SUM OF ELEC: 295 MOSM/KG (ref 275–295)
PLATELET # BLD AUTO: 155 K/UL (ref 140–400)
PMV BLD AUTO: 9 FL (ref 7.4–10.3)
POTASSIUM SERPL-SCNC: 4.2 MMOL/L (ref 3.3–5.1)
RBC # BLD AUTO: 3.3 M/UL (ref 3.7–5.4)
SODIUM SERPL-SCNC: 137 MMOL/L (ref 136–144)
WBC # BLD AUTO: 10 K/UL (ref 4–11)

## 2017-09-05 PROCEDURE — 85025 COMPLETE CBC W/AUTO DIFF WBC: CPT

## 2017-09-05 PROCEDURE — 36415 COLL VENOUS BLD VENIPUNCTURE: CPT

## 2017-09-05 PROCEDURE — 80048 BASIC METABOLIC PNL TOTAL CA: CPT

## 2017-11-03 ENCOUNTER — LAB REQUISITION (OUTPATIENT)
Dept: LAB | Facility: HOSPITAL | Age: 82
End: 2017-11-03
Payer: MEDICARE

## 2017-11-03 DIAGNOSIS — E78.00 PURE HYPERCHOLESTEROLEMIA: ICD-10-CM

## 2017-11-03 DIAGNOSIS — D64.9 ANEMIA: ICD-10-CM

## 2017-11-03 DIAGNOSIS — N18.30 CHRONIC KIDNEY DISEASE, STAGE III (MODERATE) (HCC): ICD-10-CM

## 2017-11-03 PROCEDURE — 85025 COMPLETE CBC W/AUTO DIFF WBC: CPT | Performed by: INTERNAL MEDICINE

## 2017-11-03 PROCEDURE — 84460 ALANINE AMINO (ALT) (SGPT): CPT | Performed by: INTERNAL MEDICINE

## 2017-11-03 PROCEDURE — 80048 BASIC METABOLIC PNL TOTAL CA: CPT | Performed by: INTERNAL MEDICINE

## 2017-11-03 PROCEDURE — 80061 LIPID PANEL: CPT | Performed by: INTERNAL MEDICINE

## 2017-12-03 ENCOUNTER — APPOINTMENT (OUTPATIENT)
Dept: CT IMAGING | Facility: HOSPITAL | Age: 82
DRG: 392 | End: 2017-12-03
Attending: EMERGENCY MEDICINE
Payer: MEDICARE

## 2017-12-03 ENCOUNTER — APPOINTMENT (OUTPATIENT)
Dept: GENERAL RADIOLOGY | Facility: HOSPITAL | Age: 82
DRG: 392 | End: 2017-12-03
Attending: EMERGENCY MEDICINE
Payer: MEDICARE

## 2017-12-03 ENCOUNTER — HOSPITAL ENCOUNTER (INPATIENT)
Facility: HOSPITAL | Age: 82
LOS: 5 days | Discharge: HOME | DRG: 392 | End: 2017-12-08
Attending: EMERGENCY MEDICINE | Admitting: INTERNAL MEDICINE
Payer: MEDICARE

## 2017-12-03 DIAGNOSIS — R00.0 SINUS TACHYCARDIA: ICD-10-CM

## 2017-12-03 DIAGNOSIS — R77.8 ELEVATED TROPONIN: ICD-10-CM

## 2017-12-03 DIAGNOSIS — R10.9 ABDOMINAL PAIN OF UNKNOWN ETIOLOGY: Primary | ICD-10-CM

## 2017-12-03 DIAGNOSIS — R11.2 NAUSEA AND VOMITING IN ADULT: ICD-10-CM

## 2017-12-03 PROBLEM — R79.89 AZOTEMIA: Status: ACTIVE | Noted: 2017-12-03

## 2017-12-03 PROBLEM — R73.9 HYPERGLYCEMIA: Status: ACTIVE | Noted: 2017-12-03

## 2017-12-03 PROBLEM — E87.20 METABOLIC ACIDOSIS: Status: ACTIVE | Noted: 2017-12-03

## 2017-12-03 PROBLEM — E87.2 METABOLIC ACIDOSIS: Status: ACTIVE | Noted: 2017-12-03

## 2017-12-03 PROCEDURE — 99285 EMERGENCY DEPT VISIT HI MDM: CPT

## 2017-12-03 PROCEDURE — 96361 HYDRATE IV INFUSION ADD-ON: CPT

## 2017-12-03 PROCEDURE — 71010 XR CHEST AP PORTABLE  (CPT=71010): CPT | Performed by: EMERGENCY MEDICINE

## 2017-12-03 PROCEDURE — 83605 ASSAY OF LACTIC ACID: CPT | Performed by: EMERGENCY MEDICINE

## 2017-12-03 PROCEDURE — 85025 COMPLETE CBC W/AUTO DIFF WBC: CPT | Performed by: EMERGENCY MEDICINE

## 2017-12-03 PROCEDURE — 84484 ASSAY OF TROPONIN QUANT: CPT | Performed by: EMERGENCY MEDICINE

## 2017-12-03 PROCEDURE — 96375 TX/PRO/DX INJ NEW DRUG ADDON: CPT

## 2017-12-03 PROCEDURE — 80076 HEPATIC FUNCTION PANEL: CPT | Performed by: EMERGENCY MEDICINE

## 2017-12-03 PROCEDURE — 96374 THER/PROPH/DIAG INJ IV PUSH: CPT

## 2017-12-03 PROCEDURE — 74176 CT ABD & PELVIS W/O CONTRAST: CPT | Performed by: EMERGENCY MEDICINE

## 2017-12-03 PROCEDURE — 80061 LIPID PANEL: CPT | Performed by: EMERGENCY MEDICINE

## 2017-12-03 PROCEDURE — 93010 ELECTROCARDIOGRAM REPORT: CPT | Performed by: EMERGENCY MEDICINE

## 2017-12-03 PROCEDURE — 93005 ELECTROCARDIOGRAM TRACING: CPT

## 2017-12-03 PROCEDURE — 83690 ASSAY OF LIPASE: CPT | Performed by: EMERGENCY MEDICINE

## 2017-12-03 PROCEDURE — 80048 BASIC METABOLIC PNL TOTAL CA: CPT | Performed by: EMERGENCY MEDICINE

## 2017-12-03 RX ORDER — PREDNISONE 1 MG/1
5 TABLET ORAL DAILY
Status: DISCONTINUED | OUTPATIENT
Start: 2017-12-03 | End: 2017-12-05

## 2017-12-03 RX ORDER — LISINOPRIL 10 MG/1
10 TABLET ORAL DAILY
Status: DISCONTINUED | OUTPATIENT
Start: 2017-12-03 | End: 2017-12-08

## 2017-12-03 RX ORDER — DEXTROSE, SODIUM CHLORIDE, AND POTASSIUM CHLORIDE 5; .9; .15 G/100ML; G/100ML; G/100ML
INJECTION INTRAVENOUS CONTINUOUS
Status: DISCONTINUED | OUTPATIENT
Start: 2017-12-03 | End: 2017-12-03

## 2017-12-03 RX ORDER — ANASTROZOLE 1 MG/1
1 TABLET ORAL DAILY
Status: DISCONTINUED | OUTPATIENT
Start: 2017-12-03 | End: 2017-12-08

## 2017-12-03 RX ORDER — SODIUM CHLORIDE 0.9 % (FLUSH) 0.9 %
3 SYRINGE (ML) INJECTION AS NEEDED
Status: DISCONTINUED | OUTPATIENT
Start: 2017-12-03 | End: 2017-12-08

## 2017-12-03 RX ORDER — DOCUSATE SODIUM 100 MG/1
100 CAPSULE, LIQUID FILLED ORAL 2 TIMES DAILY
Status: DISCONTINUED | OUTPATIENT
Start: 2017-12-03 | End: 2017-12-07

## 2017-12-03 RX ORDER — POLYETHYLENE GLYCOL 3350 17 G/17G
17 POWDER, FOR SOLUTION ORAL DAILY PRN
Status: DISCONTINUED | OUTPATIENT
Start: 2017-12-03 | End: 2017-12-08

## 2017-12-03 RX ORDER — CITALOPRAM 20 MG/1
10 TABLET ORAL EVERY OTHER DAY
Status: DISCONTINUED | OUTPATIENT
Start: 2017-12-04 | End: 2017-12-08

## 2017-12-03 RX ORDER — ONDANSETRON 2 MG/ML
4 INJECTION INTRAMUSCULAR; INTRAVENOUS ONCE
Status: COMPLETED | OUTPATIENT
Start: 2017-12-03 | End: 2017-12-03

## 2017-12-03 RX ORDER — ONDANSETRON 2 MG/ML
4 INJECTION INTRAMUSCULAR; INTRAVENOUS EVERY 6 HOURS PRN
Status: DISCONTINUED | OUTPATIENT
Start: 2017-12-03 | End: 2017-12-08

## 2017-12-03 RX ORDER — BISACODYL 10 MG
10 SUPPOSITORY, RECTAL RECTAL
Status: DISCONTINUED | OUTPATIENT
Start: 2017-12-03 | End: 2017-12-08

## 2017-12-03 RX ORDER — SODIUM CHLORIDE 9 MG/ML
INJECTION, SOLUTION INTRAVENOUS
Status: COMPLETED
Start: 2017-12-03 | End: 2017-12-03

## 2017-12-03 RX ORDER — ALPRAZOLAM 0.25 MG/1
0.25 TABLET ORAL EVERY 4 HOURS PRN
Status: DISCONTINUED | OUTPATIENT
Start: 2017-12-03 | End: 2017-12-08

## 2017-12-03 RX ORDER — PANTOPRAZOLE SODIUM 40 MG/1
40 TABLET, DELAYED RELEASE ORAL
Status: DISCONTINUED | OUTPATIENT
Start: 2017-12-03 | End: 2017-12-07

## 2017-12-03 RX ORDER — SODIUM CHLORIDE 9 MG/ML
INJECTION, SOLUTION INTRAVENOUS CONTINUOUS
Status: DISCONTINUED | OUTPATIENT
Start: 2017-12-03 | End: 2017-12-06

## 2017-12-03 RX ORDER — MORPHINE SULFATE 2 MG/ML
2 INJECTION, SOLUTION INTRAMUSCULAR; INTRAVENOUS ONCE
Status: COMPLETED | OUTPATIENT
Start: 2017-12-03 | End: 2017-12-03

## 2017-12-03 RX ORDER — HEPARIN SODIUM 5000 [USP'U]/ML
5000 INJECTION, SOLUTION INTRAVENOUS; SUBCUTANEOUS EVERY 12 HOURS SCHEDULED
Status: DISCONTINUED | OUTPATIENT
Start: 2017-12-03 | End: 2017-12-07

## 2017-12-03 RX ORDER — SODIUM CHLORIDE 9 MG/ML
INJECTION, SOLUTION INTRAVENOUS ONCE
Status: COMPLETED | OUTPATIENT
Start: 2017-12-03 | End: 2017-12-03

## 2017-12-03 RX ORDER — ACETAMINOPHEN 325 MG/1
650 TABLET ORAL EVERY 6 HOURS PRN
Status: DISCONTINUED | OUTPATIENT
Start: 2017-12-03 | End: 2017-12-08

## 2017-12-03 RX ORDER — SODIUM CHLORIDE 9 MG/ML
INJECTION, SOLUTION INTRAVENOUS CONTINUOUS
Status: ACTIVE | OUTPATIENT
Start: 2017-12-03 | End: 2017-12-03

## 2017-12-03 NOTE — HISTORICAL OFFICE NOTE
Ajay Tolliver  [phone]  : 06/15/1935  ACCOUNT:  [id number]  PCP: Ruslan Rojo MD  CARDIOLOGIST: Les Nazario MD  Hospital: [facility]  Admitted: 2017  Discharged: 2017    DISCHARGE SUMMARY    ASSESSMENT:   1. Hyponatremia. 2. Tachycardia.

## 2017-12-03 NOTE — CONSULTS
[facility] AND [facility]  [facility] Cardiology Consult Note    Karlos Tinsley Patient Status:  Emergency    6/15/1935 MRN [id number]   Location [address] Attending Brayton Mohs, MD   Hosp Day # 0 PCP Elmo Canchola MD     [de-identified] y subtrochanteric with overriding and displacement of the fracture fragments, longitudinal nondisplaced fractures of the pubic rami superior and inferior   • Hyperlipidemia    • Osteopenia    • Pain    • Perforated peptic ulcer ([facility] [address] 1991   • Polymyalgia rhe

## 2017-12-03 NOTE — ED PROVIDER NOTES
Patient Seen in: [facility] Emergency Department    History   Patient presents with:  Nausea/Vomiting/Diarrhea (gastrointestinal)    Stated Complaint: n/v    HPI    The patient is an 61-year-old female with past history of hypertension, breast can Review of Systems    Positive for stated complaint: n/v  Other systems are as noted in HPI. Constitutional and vital signs reviewed. All other systems reviewed and negative except as noted above.     Physical Exam   ED Triage Vitals [12/03/17 12 within normal limits   TROPONIN I, 2 HOUR - Abnormal; Notable for the following:     Troponin 0.42 (*)     All other components within normal limits   CBC W/ DIFFERENTIAL - Abnormal; Notable for the following:     RBC 3.61 (*)     HGB 11.2 (*)     HCT 33. 5 ED Course as of Dec 03 1649  ------------------------------------------------------------  The patient was reexamined at 3 PM.  She states her pain is improved. The patient's heart rate is decreased to approximately 100.   Patient's lab results and x-r

## 2017-12-04 PROCEDURE — 87493 C DIFF AMPLIFIED PROBE: CPT | Performed by: INTERNAL MEDICINE

## 2017-12-04 PROCEDURE — 80048 BASIC METABOLIC PNL TOTAL CA: CPT | Performed by: INTERNAL MEDICINE

## 2017-12-04 PROCEDURE — 85025 COMPLETE CBC W/AUTO DIFF WBC: CPT | Performed by: INTERNAL MEDICINE

## 2017-12-04 PROCEDURE — 87086 URINE CULTURE/COLONY COUNT: CPT | Performed by: INTERNAL MEDICINE

## 2017-12-04 PROCEDURE — 83690 ASSAY OF LIPASE: CPT | Performed by: INTERNAL MEDICINE

## 2017-12-04 PROCEDURE — 81001 URINALYSIS AUTO W/SCOPE: CPT | Performed by: INTERNAL MEDICINE

## 2017-12-04 PROCEDURE — 82607 VITAMIN B-12: CPT | Performed by: INTERNAL MEDICINE

## 2017-12-04 PROCEDURE — 82746 ASSAY OF FOLIC ACID SERUM: CPT | Performed by: INTERNAL MEDICINE

## 2017-12-04 NOTE — PROGRESS NOTES
[facility]  Progress Note    Ashley Tinsley Patient Status:  Inpatient    6/15/1935 MRN [id number]   Location [facility] 3W/SW Attending Gali Barahona MD   Hosp Day # 1 PCP Aliyah Renner MD     Assessment:    1.   Acute gastroenteriti 8.1 12/04/2017   HGB 9.4 12/04/2017   HCT 28.9 12/04/2017    12/04/2017     No results found for: PT, INR    Lab Results  Component Value Date    12/04/2017   K 4.6 12/04/2017    12/04/2017   CO2 16 12/04/2017   BUN 51 12/04/2017   CREAT

## 2017-12-04 NOTE — CONSULTS
[facility]    Report of Consultation    Saima Tinsley Patient Status:  Inpatient    6/15/1935 MRN [id number]   Location [facility] 3W/SW Attending Federica Charles MD   Hosp Day # 1 PCP Tomy Rothman MD     [facility]) diverticulosis / Internal hemorrhoid     Past Surgical History:  No date: BREAST SURGERY  6/18/2012: CARDIAC CATHETERIZATION  07/2017: EGD  2009: HIP SURGERY Left      Comment: left femur fracture/pubic rami fractur  1985: HYSTERECTOMY  5/23/2003: Jennifer Carroll Positives include that which is stated in HPI    Physical Exam:    /51 (BP Location: Right arm)   Pulse 76   Temp (!) 97.4 °F (36.3 °C) (Oral)   Resp 18   Ht 5' 3\" (1.6 m)   Wt 120 lb 12.8 oz (54.8 kg)   SpO2 100%   BMI 21.40 kg/m²     General Appea 1. No acute findings. Xr Chest Ap Portable  (cpt=71010)    Result Date: 12/3/2017  CONCLUSION:  1. No acute findings. Assessment/Plan:  1. Acute onset abdominal pain, N/V and non bloody diarrhea-suspect acute gastroenteritis.  Need to

## 2017-12-04 NOTE — CM/SW NOTE
Explanation of of BPCI/Medicare program provided. Patient was enrolled under . Patient/family agreed to phone f/u for 3 months from [address] after discharge from [address] [place] [address]. BPCI/Medicare letter and brochure provided.     Nathen Moore [facility] Kulwant Kelly

## 2017-12-04 NOTE — PLAN OF CARE
CARDIOVASCULAR - ADULT    • Maintains optimal cardiac output and hemodynamic stability Progressing    • Absence of cardiac arrhythmias or at baseline Progressing    Stable    GASTROINTESTINAL - ADULT    • Minimal or absence of nausea and vomiting Progressi

## 2017-12-04 NOTE — H&P
[facility]    PATIENT'S NAME: Gan Brterra SOLOMON   ATTENDING PHYSICIAN: Cecilio Sung MD   PATIENT ACCOUNT#:   [id number]    LOCATION:  [address] [place] [address] #:   [id number]       YOB: 1935  ADMISSION DATE:       12/03/ surgical repair with Dr. Padmaja Mcdowell 02/02/2009, right mastectomy in 2004.       MEDICATIONS:  Iron sulfate 325 a day, alprazolam 0.25 mg q.i.d. p.r.n., anastrozole 1 mg daily, Celexa 10 mg a day, lisinopril 10 mg a day, metoprolol 25 mg twice a day, Protonix 40 Deferred at this time. NEUROLOGIC:  Oriented x3. Cranial nerves 3-12 grossly intact. Motor 5+/5+ hand grasp. INITIAL ASSESSMENT:    1.   Nausea, vomiting and diarrhea, likely gastroenteritis. 2.   Severe dehydration. 3.   Chronic anemia.   4.

## 2017-12-04 NOTE — CM/SW NOTE
Met with patient at bedside regarding discharge planning. Patient states she lives alone and does not drive. She uses a walker at home and has no stairs. Her daughters are nearby and will run errands and go to the grocery store for her.  Patient states she

## 2017-12-04 NOTE — PLAN OF CARE
Problem: CARDIOVASCULAR - ADULT  Goal: Maintains optimal cardiac output and hemodynamic stability  INTERVENTIONS:  - Monitor vital signs, rhythm, and trends  - Monitor for bleeding, hypotension and signs of decreased cardiac output  - Evaluate effectivenes Progressing      Comments: -[address] - Dr Vangie Feliciano aware  NS infusing @ 250cc/hr x3hrs - then 100ml/hr continuous  Pt tolerating clear liquid diet  Denies pain/denies sob

## 2017-12-05 PROCEDURE — 85025 COMPLETE CBC W/AUTO DIFF WBC: CPT | Performed by: INTERNAL MEDICINE

## 2017-12-05 PROCEDURE — 97161 PT EVAL LOW COMPLEX 20 MIN: CPT

## 2017-12-05 PROCEDURE — 80048 BASIC METABOLIC PNL TOTAL CA: CPT | Performed by: INTERNAL MEDICINE

## 2017-12-05 PROCEDURE — 87507 IADNA-DNA/RNA PROBE TQ 12-25: CPT | Performed by: INTERNAL MEDICINE

## 2017-12-05 PROCEDURE — 82272 OCCULT BLD FECES 1-3 TESTS: CPT | Performed by: INTERNAL MEDICINE

## 2017-12-05 PROCEDURE — 97116 GAIT TRAINING THERAPY: CPT

## 2017-12-05 RX ORDER — 0.9 % SODIUM CHLORIDE 0.9 %
VIAL (ML) INJECTION
Status: DISPENSED
Start: 2017-12-05 | End: 2017-12-05

## 2017-12-05 RX ORDER — METOCLOPRAMIDE HYDROCHLORIDE 5 MG/ML
10 INJECTION INTRAMUSCULAR; INTRAVENOUS ONCE
Status: COMPLETED | OUTPATIENT
Start: 2017-12-05 | End: 2017-12-05

## 2017-12-05 RX ORDER — PREDNISONE 10 MG/1
10 TABLET ORAL DAILY
Status: DISCONTINUED | OUTPATIENT
Start: 2017-12-05 | End: 2017-12-08

## 2017-12-05 NOTE — PHYSICAL THERAPY NOTE
PHYSICAL THERAPY QUICK EVALUATION - INPATIENT    Room Number: 310/310-A  Evaluation Date: 12/5/2017  Presenting Problem: Gastroentiritis  Physician Order: PT Eval and Treat    Problem List  Principal Problem:    Abdominal pain of unknown etiology  Active No  Patient Owned Equipment: Rolling walker       Prior Level of [place]: Ind/mod I in her mobilities at home and uses a RW in the community and hold on to furniture for support within the home for overall stability.  Daughter assist needs at home but acute onset of gastroenteritis with constant diarrhea with abdominal cramping. Pertinent comorbidities and personal factors impacting therapy include she lives alone and uses a RW in the community.   Based on this evaluation, patient's clinical presentation

## 2017-12-05 NOTE — PLAN OF CARE
CARDIOVASCULAR - ADULT    • Maintains optimal cardiac output and hemodynamic stability Progressing    • Absence of cardiac arrhythmias or at baseline Progressing    Hemodynamically stable - transfer with remote tele orders     GASTROINTESTINAL - ADULT    •

## 2017-12-05 NOTE — PROGRESS NOTES
[facility]    Progress Note    Alisson Tinsley Patient Status:  Inpatient    6/15/1935 MRN [id number]   Location [facility] 3W/SW Attending Ashish Orlando MD   Hosp Day # 2 PCP Kristin Gomez MD       Subjective:    Sofie Landau results for input(s): PGLU in the last 72 hours.     Scheduled Meds:   • Sodium Chloride       • predniSONE  10 mg Oral Daily   • anastrozole  1 mg Oral Daily   • Citalopram Hydrobromide  10 mg Oral QOD   • lisinopril  10 mg Oral Daily   • metoprolol Linn Gaffney

## 2017-12-05 NOTE — PROGRESS NOTES
[facility]    GI Progress Note      Vanessa Tinsley Patient Status:  Inpatient    6/15/1935 MRN [id number]   Location [facility] 3W/SW Attending Bryan Grigsby MD   Hosp Day # 2 PCP Theo Barillas MD          SUBJECTIVE:     Pr

## 2017-12-06 PROCEDURE — 80048 BASIC METABOLIC PNL TOTAL CA: CPT | Performed by: INTERNAL MEDICINE

## 2017-12-06 PROCEDURE — 85025 COMPLETE CBC W/AUTO DIFF WBC: CPT | Performed by: INTERNAL MEDICINE

## 2017-12-06 NOTE — PROGRESS NOTES
[facility]    GI Progress Note      Holly Lombardi Patient Status:  Inpatient    6/15/1935 MRN [id number]   Location [facility] 3W/SW Attending Tracy Mohr MD   Hosp Day # 3 PCP Chloe Varner MD          SUBJECTIVE:     Pa

## 2017-12-06 NOTE — PROGRESS NOTES
[facility] HOSP - [facility]    Progress Note    Prerna Tinsley Patient Status:  Inpatient    6/15/1935 MRN [id number]   Location [facility] 3W/SW Attending Maria Ines Santiago MD   Hosp Day # 3 PCP Jeff Kimble MD       Subjective:    Edgardo Weathers 119*   CO2  15*  16*  15*  17*   ALKPHO  92   --    --    --    AST  31   --    --    --    ALT  27   --    --    --    BILT  0.7   --    --    --    TP  7.0   --    --    --        Recent Labs   Lab  12/04/17   0546   LIP  22       Recent Labs   Lab  12/0

## 2017-12-06 NOTE — RESTORATIVE THERAPY
RESTORATIVE CARE TREATMENT NOTE    Presenting Problem  Presenting Problem: Gastroentiritis          Precautions          Weight Bearing Restriction  Weight Bearing Restriction: None                   SUNDAY MONDAY TUESDAY WEDNESDAY THURSDAY Mel Jon

## 2017-12-07 ENCOUNTER — ANESTHESIA (OUTPATIENT)
Dept: ENDOSCOPY | Facility: HOSPITAL | Age: 82
DRG: 392 | End: 2017-12-07
Payer: MEDICARE

## 2017-12-07 ENCOUNTER — ANESTHESIA EVENT (OUTPATIENT)
Dept: ENDOSCOPY | Facility: HOSPITAL | Age: 82
DRG: 392 | End: 2017-12-07
Payer: MEDICARE

## 2017-12-07 ENCOUNTER — SURGERY (OUTPATIENT)
Age: 82
End: 2017-12-07

## 2017-12-07 PROCEDURE — 0DJ08ZZ INSPECTION OF UPPER INTESTINAL TRACT, VIA NATURAL OR ARTIFICIAL OPENING ENDOSCOPIC: ICD-10-PCS | Performed by: INTERNAL MEDICINE

## 2017-12-07 PROCEDURE — 82728 ASSAY OF FERRITIN: CPT | Performed by: INTERNAL MEDICINE

## 2017-12-07 PROCEDURE — 85025 COMPLETE CBC W/AUTO DIFF WBC: CPT | Performed by: INTERNAL MEDICINE

## 2017-12-07 PROCEDURE — 0DBE8ZX EXCISION OF LARGE INTESTINE, VIA NATURAL OR ARTIFICIAL OPENING ENDOSCOPIC, DIAGNOSTIC: ICD-10-PCS | Performed by: INTERNAL MEDICINE

## 2017-12-07 PROCEDURE — 84466 ASSAY OF TRANSFERRIN: CPT | Performed by: INTERNAL MEDICINE

## 2017-12-07 PROCEDURE — 88305 TISSUE EXAM BY PATHOLOGIST: CPT | Performed by: INTERNAL MEDICINE

## 2017-12-07 PROCEDURE — 83540 ASSAY OF IRON: CPT | Performed by: INTERNAL MEDICINE

## 2017-12-07 PROCEDURE — 80048 BASIC METABOLIC PNL TOTAL CA: CPT | Performed by: INTERNAL MEDICINE

## 2017-12-07 RX ORDER — LIDOCAINE HYDROCHLORIDE 10 MG/ML
INJECTION, SOLUTION EPIDURAL; INFILTRATION; INTRACAUDAL; PERINEURAL AS NEEDED
Status: DISCONTINUED | OUTPATIENT
Start: 2017-12-07 | End: 2017-12-07 | Stop reason: SURG

## 2017-12-07 RX ORDER — LOPERAMIDE HYDROCHLORIDE 2 MG/1
2 CAPSULE ORAL 4 TIMES DAILY PRN
Status: DISCONTINUED | OUTPATIENT
Start: 2017-12-07 | End: 2017-12-08

## 2017-12-07 RX ORDER — SODIUM CHLORIDE, SODIUM LACTATE, POTASSIUM CHLORIDE, CALCIUM CHLORIDE 600; 310; 30; 20 MG/100ML; MG/100ML; MG/100ML; MG/100ML
INJECTION, SOLUTION INTRAVENOUS CONTINUOUS PRN
Status: DISCONTINUED | OUTPATIENT
Start: 2017-12-07 | End: 2017-12-07 | Stop reason: SURG

## 2017-12-07 RX ORDER — SODIUM CHLORIDE, SODIUM LACTATE, POTASSIUM CHLORIDE, CALCIUM CHLORIDE 600; 310; 30; 20 MG/100ML; MG/100ML; MG/100ML; MG/100ML
INJECTION, SOLUTION INTRAVENOUS CONTINUOUS
Status: DISCONTINUED | OUTPATIENT
Start: 2017-12-07 | End: 2017-12-08

## 2017-12-07 RX ORDER — NALOXONE HYDROCHLORIDE 0.4 MG/ML
80 INJECTION, SOLUTION INTRAMUSCULAR; INTRAVENOUS; SUBCUTANEOUS AS NEEDED
Status: DISCONTINUED | OUTPATIENT
Start: 2017-12-07 | End: 2017-12-07 | Stop reason: HOSPADM

## 2017-12-07 RX ORDER — POTASSIUM CHLORIDE 20 MEQ/1
40 TABLET, EXTENDED RELEASE ORAL EVERY 4 HOURS
Status: COMPLETED | OUTPATIENT
Start: 2017-12-07 | End: 2017-12-08

## 2017-12-07 RX ADMIN — LIDOCAINE HYDROCHLORIDE 50 MG: 10 INJECTION, SOLUTION EPIDURAL; INFILTRATION; INTRACAUDAL; PERINEURAL at 16:11:00

## 2017-12-07 RX ADMIN — SODIUM CHLORIDE, SODIUM LACTATE, POTASSIUM CHLORIDE, CALCIUM CHLORIDE: 600; 310; 30; 20 INJECTION, SOLUTION INTRAVENOUS at 17:05:00

## 2017-12-07 RX ADMIN — SODIUM CHLORIDE, SODIUM LACTATE, POTASSIUM CHLORIDE, CALCIUM CHLORIDE: 600; 310; 30; 20 INJECTION, SOLUTION INTRAVENOUS at 16:12:00

## 2017-12-07 NOTE — PROGRESS NOTES
[facility]    Progress Note    Minna Tinsley Patient Status:  Inpatient    6/15/1935 MRN [id number]   Location [facility] 3W/SW Attending Maliha Duke MD   Hosp Day # 4 PCP Octavio Astudillo MD       Subjective:    Kala Jaimes No results for input(s): LIP, JACKIE in the last 72 hours. No results for input(s): TROP, CK in the last 72 hours. No results for input(s): PT, INR in the last 72 hours. No results for input(s): PGLU in the last 72 hours.     Scheduled Meds:   •

## 2017-12-07 NOTE — H&P
[facility] Bakari Brownnett Patient Status:  Inpatient    6/15/1935 MRN [id number]   Location [facility] ENDOSCOPY LAB SUITES Attending Ashish Orlando MD   Hosp Day # 4 PCP Kristin Gomez MD     Date:  1 Age of Onset   • in child birth Saurav Demarco Mother      35   • old age Saurav Demarco Father      80   • Heart Disease Brother      congenital heart disease / 48 cause of death   • Heart Disease Brother    • Cancer Cousin      colon       Allergies/Medications:    All  12/07/2017   CREATSERUM 1.20 12/07/2017   BUN 18 12/07/2017    12/07/2017   K 3.4 12/07/2017    (H) 12/07/2017   CO2 16 (L) 12/07/2017   GLU 75 12/07/2017   CA 9.0 12/07/2017   ALB 4.0 12/03/2017   ALKPHO 92 12/03/2017   BILT 0.7 12/0

## 2017-12-07 NOTE — OPERATIVE REPORT
[facility] HOSP - [facility]    Esophagogastroduodenoscopy and Colonoscopy Report    Prerna Tinsley Patient Status:  Inpatient   Date of Birth 6/15/1935 MRN [id number]   Location [facility] ENDOSCOPY LAB SUITES Attending Maria Ines Santiago MD     PCP identified. RECOMMENDATIONS:  1. High fiber diet  2. Metamucil  3, PRN imodium  4.  Advance diet as tolerated  5. d/c Pantoprazole      Sandor Cole MD  12/7/2017  5:01 PM

## 2017-12-07 NOTE — ANESTHESIA PREPROCEDURE EVALUATION
Anesthesia PreOp Note    HPI:     Radha Wyatt is a [de-identified]year old female who presents for preoperative consultation requested by: Elma Johnson MD    Date of Surgery: 12/3/2017 - 12/7/2017    Procedure(s):  COLONOSCOPY  ESOPHAGOGASTRODUODENOSCOPY (EGD • Breast cancer of upper-inner quadrant of left female breast ([facility] [address] 3/26/2012   • Depression    • Essential hypertension    • Hearing impairment    • Herpes zoster 2003    shoulder, left   • High blood pressure    • High cholesterol    • Hip fracture, left aspirin 81 MG Oral Tab Take 81 mg by mouth daily. Disp:  Rfl:  12/2/2017 at Unknown time   lisinopril 10 MG Oral Tab Take 10 mg by mouth daily. Disp:  Rfl:  12/2/2017 at Unknown time   Atorvastatin Calcium 40 MG Oral Tab Take 40 mg by mouth nightly.  Disp: PEG 3350 (MIRALAX) powder packet 17 g 17 g Oral Daily PRN Yvonne Streeter MD    bisacodyl (DULCOLAX) rectal suppository 10 mg 10 mg Rectal Daily PRN Yvonne Streeter MD      No current [facility]-ordered outpatient prescriptions on file.     No Known Allergies Resp: 18 18 18 14   Temp: 98.2 °F (36.8 °C) (!) 97.5 °F (36.4 °C) 97.6 °F (36.4 °C)    TempSrc: Oral Oral Oral    SpO2: 100% 100% 100% 100%   Weight:  52.6 kg (116 lb)     Height:            Anesthesia ROS/Med Hx and Physical Exam      No history of anesth

## 2017-12-07 NOTE — ANESTHESIA POSTPROCEDURE EVALUATION
Patient:  Stevie Tinsley    Procedure Summary     Date:  12/07/17 Room / Location:  [address] [place] [address] ENDOSCOPY 05 / [address] [place] [address] ENDOSCOPY    Anesthesia Start:  7174 Anesthesia Stop:  6252    Procedures:       COLONOSCOPY (N/A )      ESOPHAGOGASTRODUODENOSCOPY (EGD) (N/A ) Jorge L Schofield

## 2017-12-08 VITALS
SYSTOLIC BLOOD PRESSURE: 132 MMHG | BODY MASS INDEX: 20.22 KG/M2 | OXYGEN SATURATION: 96 % | TEMPERATURE: 98 F | HEART RATE: 72 BPM | HEIGHT: 63 IN | RESPIRATION RATE: 17 BRPM | WEIGHT: 114.13 LBS | DIASTOLIC BLOOD PRESSURE: 66 MMHG

## 2017-12-08 PROCEDURE — 84132 ASSAY OF SERUM POTASSIUM: CPT | Performed by: INTERNAL MEDICINE

## 2017-12-08 RX ORDER — 0.9 % SODIUM CHLORIDE 0.9 %
VIAL (ML) INJECTION
Status: COMPLETED
Start: 2017-12-08 | End: 2017-12-08

## 2017-12-08 RX ORDER — MELATONIN
325 SEE ADMIN INSTRUCTIONS
Qty: 30 TABLET | Refills: 5 | Status: SHIPPED | OUTPATIENT
Start: 2017-12-08

## 2017-12-08 NOTE — PROGRESS NOTES
[facility]    Progress Note    Nagi Tinsley Patient Status:  Inpatient    6/15/1935 MRN [id number]   Location [facility] 3W/SW Attending Juliana Anne MD   Hosp Day # 5 PCP Carissarosa elena Mckenzie MD       Subjective:    Stormy Goff 16*   --        No results for input(s): LIP, JACKIE in the last 72 hours. No results for input(s): TROP, CK in the last 72 hours. No results for input(s): PT, INR in the last 72 hours. No results for input(s): PGLU in the last 72 hours.     Scheduled

## 2017-12-08 NOTE — RESTORATIVE THERAPY
RESTORATIVE CARE TREATMENT NOTE    Presenting Problem  Presenting Problem: Gastroentiritis          Precautions          Weight Bearing Restriction  Weight Bearing Restriction: None                   SUNDAY MONDAY TUESDAY WEDNESDAY THURSDAY Danuta Ly

## 2017-12-08 NOTE — PROGRESS NOTES
[facility]    GI Progress Note      Shmuel Tinsley Patient Status:  Inpatient    6/15/1935 MRN [id number]   Location [facility] 3W/SW Attending Franki Robertson MD   Hosp Day # 5 PCP Merissa Jarrell MD          SUBJECTIVE:     No

## 2017-12-08 NOTE — PROGRESS NOTES
Per HIPPA guidelines left detailed voice message re: test result as noted per Dr. Tamiko Perez.   Instructed to call back with any questions or concerns [phone]

## 2017-12-13 ENCOUNTER — LAB ENCOUNTER (OUTPATIENT)
Dept: LAB | Facility: HOSPITAL | Age: 82
End: 2017-12-13
Attending: INTERNAL MEDICINE
Payer: MEDICARE

## 2017-12-13 DIAGNOSIS — D64.9 ANEMIA, UNSPECIFIED: Primary | ICD-10-CM

## 2017-12-13 PROCEDURE — 85025 COMPLETE CBC W/AUTO DIFF WBC: CPT

## 2017-12-13 PROCEDURE — 36415 COLL VENOUS BLD VENIPUNCTURE: CPT

## 2018-01-10 NOTE — DISCHARGE SUMMARY
[facility]    PATIENT'S NAME: Geno Ayonjaylyn SOLOMON   ATTENDING PHYSICIAN: Cecilio Stevens MD   PATIENT ACCOUNT#:   [id number]    LOCATION:  [address] [place] [address] #:   [id number]       YOB: 1935  ADMISSION DATE:       12/03/ Cecilio Rojo MD  d: 01/10/2018 12:52:44  t: 01/10/2018 13:00:19  [facility] [id number]  [facility]/    cc: Jalil Giraldo.  Jaskaran Rojo MD

## 2018-02-22 ENCOUNTER — HOSPITAL ENCOUNTER (OUTPATIENT)
Dept: GENERAL RADIOLOGY | Facility: HOSPITAL | Age: 83
Discharge: HOME | End: 2018-02-22
Attending: INTERNAL MEDICINE
Payer: MEDICARE

## 2018-02-22 DIAGNOSIS — M25.552 HIP PAIN, ACUTE, LEFT: ICD-10-CM

## 2018-02-22 DIAGNOSIS — M89.8X5 PAIN OF LEFT FEMUR: ICD-10-CM

## 2018-02-22 PROCEDURE — 73552 X-RAY EXAM OF FEMUR 2/>: CPT | Performed by: INTERNAL MEDICINE

## 2018-02-22 PROCEDURE — 73502 X-RAY EXAM HIP UNI 2-3 VIEWS: CPT | Performed by: INTERNAL MEDICINE

## 2018-02-24 ENCOUNTER — APPOINTMENT (OUTPATIENT)
Dept: CT IMAGING | Facility: HOSPITAL | Age: 83
End: 2018-02-24
Attending: EMERGENCY MEDICINE
Payer: MEDICARE

## 2018-02-24 ENCOUNTER — HOSPITAL ENCOUNTER (EMERGENCY)
Facility: HOSPITAL | Age: 83
Discharge: HOME | End: 2018-02-24
Attending: EMERGENCY MEDICINE
Payer: MEDICARE

## 2018-02-24 ENCOUNTER — APPOINTMENT (OUTPATIENT)
Dept: GENERAL RADIOLOGY | Facility: HOSPITAL | Age: 83
End: 2018-02-24
Attending: EMERGENCY MEDICINE
Payer: MEDICARE

## 2018-02-24 VITALS
HEART RATE: 72 BPM | TEMPERATURE: 98 F | DIASTOLIC BLOOD PRESSURE: 61 MMHG | SYSTOLIC BLOOD PRESSURE: 142 MMHG | OXYGEN SATURATION: 98 % | BODY MASS INDEX: 20 KG/M2 | WEIGHT: 114.19 LBS | RESPIRATION RATE: 18 BRPM

## 2018-02-24 DIAGNOSIS — M25.552 PAIN OF LEFT HIP JOINT: Primary | ICD-10-CM

## 2018-02-24 PROCEDURE — 73552 X-RAY EXAM OF FEMUR 2/>: CPT | Performed by: EMERGENCY MEDICINE

## 2018-02-24 PROCEDURE — 99285 EMERGENCY DEPT VISIT HI MDM: CPT

## 2018-02-24 PROCEDURE — 73700 CT LOWER EXTREMITY W/O DYE: CPT | Performed by: EMERGENCY MEDICINE

## 2018-02-24 NOTE — ED INITIAL ASSESSMENT (HPI)
PATIENT HERE FOR C/O LEFT HIP PAIN, ATRAUMATIC. SEEN TWO DAYS AGO FOR THE SAME AND TOLD SCREW WAS LOOSE IN LEFT HIP.

## 2018-02-25 NOTE — ED PROVIDER NOTES
Patient Seen in: [facility] Emergency Department    History   Patient presents with:  Hip Pain    Stated Complaint: L HIP PAIN    HPI    HPI: Jun Mendoza is a [de-identified]year old female who presents complaining of sooreness in l hip with activity. SENTINEL NODE  11/23/2004: MASTECTOMY RIGHT Right      Comment: RIGHT MASTECTOMY  AND SENTINEL NODE    Medications :   ferrous sulfate 325 (65 FE) MG Oral Tab EC,  Take 1 tablet (325 mg total) by mouth See Admin Instructions.    ALPRAZolam 0.25 MG Oral Tab, °C)  Temp src: n/a  SpO2: 97 %  O2 Device: None (Room air)    Current:/61   Pulse 72   Temp 98.2 °F (36.8 °C)   Resp 18   Wt 51.8 kg   SpO2 98%   BMI 20.23 kg/m²         Physical Exam      MENTAL STATUS: Alert, oriented, and cooperative.  No focal def hip. 3. There is a know nondisplaced fracture of the inferior most transversely oriented internal fixation screw. It is located just adjacent to the internal fixation plate. Correlate clinically as to its clinical importance.               MDM

## 2018-05-08 ENCOUNTER — LAB REQUISITION (OUTPATIENT)
Dept: LAB | Facility: HOSPITAL | Age: 83
End: 2018-05-08
Payer: MEDICARE

## 2018-05-08 DIAGNOSIS — E78.00 PURE HYPERCHOLESTEROLEMIA: ICD-10-CM

## 2018-05-08 DIAGNOSIS — N18.30 CHRONIC KIDNEY DISEASE, STAGE III (MODERATE) (HCC): ICD-10-CM

## 2018-05-08 PROCEDURE — 81003 URINALYSIS AUTO W/O SCOPE: CPT | Performed by: INTERNAL MEDICINE

## 2018-05-08 PROCEDURE — 82043 UR ALBUMIN QUANTITATIVE: CPT | Performed by: INTERNAL MEDICINE

## 2018-05-08 PROCEDURE — 84443 ASSAY THYROID STIM HORMONE: CPT | Performed by: INTERNAL MEDICINE

## 2018-05-08 PROCEDURE — 80061 LIPID PANEL: CPT | Performed by: INTERNAL MEDICINE

## 2018-05-08 PROCEDURE — 82570 ASSAY OF URINE CREATININE: CPT | Performed by: INTERNAL MEDICINE

## 2018-05-08 PROCEDURE — 85025 COMPLETE CBC W/AUTO DIFF WBC: CPT | Performed by: INTERNAL MEDICINE

## 2018-05-08 PROCEDURE — 80053 COMPREHEN METABOLIC PANEL: CPT | Performed by: INTERNAL MEDICINE

## 2018-05-17 DIAGNOSIS — C50.212 BREAST CANCER OF UPPER-INNER QUADRANT OF LEFT FEMALE BREAST (HCC): ICD-10-CM

## 2018-05-22 RX ORDER — ANASTROZOLE 1 MG/1
TABLET ORAL
Qty: 90 TABLET | Refills: 0 | Status: SHIPPED | OUTPATIENT
Start: 2018-05-22 | End: 2018-08-18

## 2018-05-22 NOTE — TELEPHONE ENCOUNTER
Spoke with Kimberley Carlin-- she states she will make an appt in June when her dtr is out of school and can take her.

## 2018-07-02 ENCOUNTER — OFFICE VISIT (OUTPATIENT)
Dept: HEMATOLOGY/ONCOLOGY | Facility: HOSPITAL | Age: 83
End: 2018-07-02
Attending: INTERNAL MEDICINE
Payer: MEDICARE

## 2018-07-02 VITALS
RESPIRATION RATE: 18 BRPM | HEART RATE: 81 BPM | SYSTOLIC BLOOD PRESSURE: 135 MMHG | DIASTOLIC BLOOD PRESSURE: 42 MMHG | TEMPERATURE: 98 F | HEIGHT: 62 IN | WEIGHT: 118 LBS | BODY MASS INDEX: 21.71 KG/M2

## 2018-07-02 DIAGNOSIS — Z17.0 MALIGNANT NEOPLASM OF UPPER-INNER QUADRANT OF LEFT BREAST IN FEMALE, ESTROGEN RECEPTOR POSITIVE (HCC): ICD-10-CM

## 2018-07-02 DIAGNOSIS — Z79.811 ENCOUNTER FOR MONITORING AROMATASE INHIBITOR THERAPY: ICD-10-CM

## 2018-07-02 DIAGNOSIS — Z51.81 ENCOUNTER FOR MONITORING AROMATASE INHIBITOR THERAPY: ICD-10-CM

## 2018-07-02 DIAGNOSIS — C50.212 MALIGNANT NEOPLASM OF UPPER-INNER QUADRANT OF LEFT BREAST IN FEMALE, ESTROGEN RECEPTOR POSITIVE (HCC): ICD-10-CM

## 2018-07-02 DIAGNOSIS — M85.89 OSTEOPENIA OF MULTIPLE SITES: Primary | ICD-10-CM

## 2018-07-02 PROBLEM — Z78.0 ASYMPTOMATIC MENOPAUSE: Status: ACTIVE | Noted: 2018-07-02

## 2018-07-02 PROCEDURE — 99214 OFFICE O/P EST MOD 30 MIN: CPT | Performed by: INTERNAL MEDICINE

## 2018-07-03 NOTE — PROGRESS NOTES
HPI    Patient returns today with her daughter for follow-up of her left breast cancer. She is S/P bilateral mastectomy. The left mastectomy was done in 2003.   She elected to have the right mastectomy after the mammogram revealed numerous micro-calcifi daughter reports patient coughing intermittently when she attempts to drink liquids   Cardiovascular: Negative for chest pain. Gastrointestinal: Negative for abdominal distention, blood in stool, constipation, diarrhea, nausea and vomiting.         Dyspha state    • Breast cancer [facility]) 3/26/2012    5/23/2003:  LEFT MASTECTOMY AND SENTINEL NODE   • Breast cancer of upper-inner quadrant of left female breast ([facility] [address] 3/26/2012   • Depression    • Essential hypertension    • Hearing impairment    • Herpes zoster History   Problem Relation Age of Onset   • in child birth [de-identified] Mother      35   • old age [de-identified] Father      80   • Heart Disease Brother      congenital heart disease / 48 cause of death   • Heart Disease Brother    • Cancer Cousin      colon disease. She shortness of breath. She has had ongoing pain involving the left femur. Patient remains quite anxious about the chance of recurrent disease.   She has been told that the prednisone and anastrozole may be contributing to osteoporosis and in % 14.9   Platelet Count      601 - 400 K/   MEAN PLATELET VOLUME      7.4 - 10.3 fL 8.4   Neutrophils %      % 79   Lymphocytes %      % 10   Monocytes %      % 8   Eosinophils %      % 2   Basophils %      % 1   Neutrophils Absolute      1.8 - 7.7 K in the subcutaneous   tissues about the left hip. 3. There is a know nondisplaced fracture of the inferior most transversely oriented internal fixation screw. It is located just adjacent to the internal fixation plate.  Correlate clinically as to its clini

## 2018-07-09 ENCOUNTER — OFFICE VISIT (OUTPATIENT)
Dept: AUDIOLOGY | Facility: CLINIC | Age: 83
End: 2018-07-09

## 2018-07-09 ENCOUNTER — OFFICE VISIT (OUTPATIENT)
Dept: OTOLARYNGOLOGY | Facility: CLINIC | Age: 83
End: 2018-07-09

## 2018-07-09 VITALS — TEMPERATURE: 99 F | DIASTOLIC BLOOD PRESSURE: 70 MMHG | SYSTOLIC BLOOD PRESSURE: 130 MMHG | HEART RATE: 72 BPM

## 2018-07-09 DIAGNOSIS — H90.3 SENSORINEURAL HEARING LOSS, BILATERAL: Primary | ICD-10-CM

## 2018-07-09 DIAGNOSIS — H90.3 SENSORINEURAL HEARING LOSS (SNHL) OF BOTH EARS: Primary | ICD-10-CM

## 2018-07-09 DIAGNOSIS — H61.23 BILATERAL IMPACTED CERUMEN: ICD-10-CM

## 2018-07-09 PROCEDURE — 92557 COMPREHENSIVE HEARING TEST: CPT | Performed by: AUDIOLOGIST

## 2018-07-09 PROCEDURE — G0463 HOSPITAL OUTPT CLINIC VISIT: HCPCS | Performed by: OTOLARYNGOLOGY

## 2018-07-09 PROCEDURE — 92591 HEARING AID EXAM, BOTH EARS: CPT | Performed by: AUDIOLOGIST

## 2018-07-09 PROCEDURE — 99213 OFFICE O/P EST LOW 20 MIN: CPT | Performed by: OTOLARYNGOLOGY

## 2018-07-09 NOTE — PROGRESS NOTES
AUDIOGRAM     Fidelina Tinsley was referred for testing by Dr. Lamine Esteves for decreased hearing in both ears  6/15/1935  [id number]      Dr. Jesse Valle removed cerumen prior to today's visit. Mrs. Tinsley is accompanied to this appointment by her daughter further explore communication needs, hearing aid styles, options, costs and levels of technology. Audiological monitoring as needed during the course of medical management. Hearing Aid Evaluation    Mrs. Tinsley and her daughter wished to discuss he

## 2018-07-09 NOTE — PATIENT INSTRUCTIONS
How Hearing Aids Can Help You    If you’re losing your hearing, it can be frustrating: But, hearing aids can help you hear what [facility] been missing. Not everyone who has hearing loss needs hearing aids.  But if your hearing loss is keeping you from commun © 1225-1070 [address] [facility] [place]. All rights reserved. This information is not intended as a substitute for professional medical care. Always follow your healthcare professional's instructions.

## 2018-07-09 NOTE — PROGRESS NOTES
Nati Garner is a 82year old female. Patient presents with:  Cerumen Impaction: Patient is requesting Bilateral Ear Irrigation. HISTORY OF PRESENT ILLNESS  7/9/2018   Here for evaluation of   hearing loss.  Patient feels this has worsened over pubic rami superior and inferior   • Hyperlipidemia    • Osteopenia    • Pain    • Perforated peptic ulcer ([facility] [address] 1991   • Polymyalgia rheumatica (HCC)    • Portacath in place 2003    venous access   • S/P colonoscopy 2005    mild diverticulosis / Internal Normal  normal to inspection   Psychiatric Normal   Appropriate mood and affect. Lymph Detail Normal     Eyes Normal Conjunctiva - Right: Normal, Left: Normal. Pupil - Right: Normal, Left: Normal. EOMI.  CARLENE   Ears abNormal  normal to inspection ear [place] the   causes of symmetric sensorineural hearing loss including aging and noise related loss. With this pattern of hearing loss we consider this age-related and do not do any further workup.   If patients have communication issues we recommend hearing aids

## 2018-07-23 ENCOUNTER — OFFICE VISIT (OUTPATIENT)
Dept: AUDIOLOGY | Facility: CLINIC | Age: 83
End: 2018-07-23

## 2018-07-23 DIAGNOSIS — H90.3 SENSORINEURAL HEARING LOSS, BILATERAL: Primary | ICD-10-CM

## 2018-07-23 PROCEDURE — V5260 HEARING AID, DIGIT, BIN, ITE: HCPCS | Performed by: AUDIOLOGIST

## 2018-08-02 ENCOUNTER — HOSPITAL ENCOUNTER (OUTPATIENT)
Dept: GENERAL RADIOLOGY | Facility: HOSPITAL | Age: 83
Discharge: HOME | End: 2018-08-02
Attending: INTERNAL MEDICINE
Payer: MEDICARE

## 2018-08-02 DIAGNOSIS — R13.12 OROPHARYNGEAL DYSPHAGIA: ICD-10-CM

## 2018-08-02 PROCEDURE — 92611 MOTION FLUOROSCOPY/SWALLOW: CPT

## 2018-08-02 PROCEDURE — 74230 X-RAY XM SWLNG FUNCJ C+: CPT | Performed by: INTERNAL MEDICINE

## 2018-08-02 NOTE — PROGRESS NOTES
ADULT VIDEOFLUOROSCOPIC SWALLOWING STUDY    Referring Physician: Dr. Neva Phelps  Diagnosis: Pharyngeal  Dysphagia    Date of Service: 8/2/2018   Radiologist: Dr. Suzy Leiva     Dear Dr. Neva Phelps,    This letter is to inform you of Morgan Langley Assessment: The patient denies any pain at today's evaluation     Oral phase:  Pt with good oral acceptance and bilabial seal across all trials. No anterior spillage observed across the entire study.  Pt with intact bite reflex, timely  mastication, adequat sips  Small bites  Eat slowly  Alternate liquids and solids  No straw  Use chin tuck for liquids and solids    Medication Administration:  Take pills one at a time    Further Follow-up:  Recommend SLP service 1x/week for 1-2 sessions.      GOALS     Family/

## 2018-08-07 ENCOUNTER — OFFICE VISIT (OUTPATIENT)
Dept: AUDIOLOGY | Facility: CLINIC | Age: 83
End: 2018-08-07
Payer: MEDICARE

## 2018-08-07 DIAGNOSIS — H90.3 SENSORINEURAL HEARING LOSS, BILATERAL: Primary | ICD-10-CM

## 2018-08-07 PROCEDURE — 92593 HEARING AID CHECK, BOTH EARS: CPT | Performed by: AUDIOLOGIST

## 2018-08-07 NOTE — PROGRESS NOTES
HEARING AID FOLLOW-UP    Ara Tinsley  6/15/1935  [id number]    Patient is here for first follow-up of new hearing aid fitting  She is accompanied to this appointment by her daughter      Hearing Aid Information       Right    Left   Make: Oticon Make

## 2018-08-18 DIAGNOSIS — C50.212 BREAST CANCER OF UPPER-INNER QUADRANT OF LEFT FEMALE BREAST (HCC): ICD-10-CM

## 2018-08-20 RX ORDER — ANASTROZOLE 1 MG/1
TABLET ORAL
Qty: 90 TABLET | Refills: 3 | Status: SHIPPED | OUTPATIENT
Start: 2018-08-20 | End: 2019-08-08

## 2018-10-02 ENCOUNTER — RESEARCH ENCOUNTER (OUTPATIENT)
Dept: HEMATOLOGY/ONCOLOGY | Facility: HOSPITAL | Age: 83
End: 2018-10-02

## 2018-11-13 ENCOUNTER — LAB REQUISITION (OUTPATIENT)
Dept: LAB | Facility: HOSPITAL | Age: 83
End: 2018-11-13
Payer: MEDICARE

## 2018-11-13 DIAGNOSIS — D64.9 ANEMIA: ICD-10-CM

## 2018-11-13 DIAGNOSIS — N18.30 CHRONIC KIDNEY DISEASE, STAGE III (MODERATE) (HCC): ICD-10-CM

## 2018-11-13 PROCEDURE — 85025 COMPLETE CBC W/AUTO DIFF WBC: CPT | Performed by: INTERNAL MEDICINE

## 2018-11-13 PROCEDURE — 84460 ALANINE AMINO (ALT) (SGPT): CPT | Performed by: INTERNAL MEDICINE

## 2018-11-13 PROCEDURE — 80061 LIPID PANEL: CPT | Performed by: INTERNAL MEDICINE

## 2018-11-13 PROCEDURE — 80048 BASIC METABOLIC PNL TOTAL CA: CPT | Performed by: INTERNAL MEDICINE

## 2019-05-21 ENCOUNTER — LAB REQUISITION (OUTPATIENT)
Dept: LAB | Facility: HOSPITAL | Age: 84
End: 2019-05-21
Payer: MEDICARE

## 2019-05-21 DIAGNOSIS — N18.30 CHRONIC KIDNEY DISEASE, STAGE III (MODERATE) (HCC): ICD-10-CM

## 2019-05-21 DIAGNOSIS — E78.00 PURE HYPERCHOLESTEROLEMIA: ICD-10-CM

## 2019-05-21 PROCEDURE — 85025 COMPLETE CBC W/AUTO DIFF WBC: CPT | Performed by: INTERNAL MEDICINE

## 2019-05-21 PROCEDURE — 82570 ASSAY OF URINE CREATININE: CPT | Performed by: INTERNAL MEDICINE

## 2019-05-21 PROCEDURE — 80061 LIPID PANEL: CPT | Performed by: INTERNAL MEDICINE

## 2019-05-21 PROCEDURE — 84443 ASSAY THYROID STIM HORMONE: CPT | Performed by: INTERNAL MEDICINE

## 2019-05-21 PROCEDURE — 81003 URINALYSIS AUTO W/O SCOPE: CPT | Performed by: INTERNAL MEDICINE

## 2019-05-21 PROCEDURE — 82043 UR ALBUMIN QUANTITATIVE: CPT | Performed by: INTERNAL MEDICINE

## 2019-05-21 PROCEDURE — 80053 COMPREHEN METABOLIC PANEL: CPT | Performed by: INTERNAL MEDICINE

## 2019-05-28 ENCOUNTER — LAB ENCOUNTER (OUTPATIENT)
Dept: LAB | Facility: HOSPITAL | Age: 84
End: 2019-05-28
Attending: INTERNAL MEDICINE
Payer: MEDICARE

## 2019-05-28 DIAGNOSIS — D64.9 ANEMIA: Primary | ICD-10-CM

## 2019-05-28 PROCEDURE — 36415 COLL VENOUS BLD VENIPUNCTURE: CPT

## 2019-05-28 PROCEDURE — 83540 ASSAY OF IRON: CPT

## 2019-05-28 PROCEDURE — 82728 ASSAY OF FERRITIN: CPT

## 2019-05-28 PROCEDURE — 84466 ASSAY OF TRANSFERRIN: CPT

## 2019-06-03 ENCOUNTER — OFFICE VISIT (OUTPATIENT)
Dept: AUDIOLOGY | Facility: CLINIC | Age: 84
End: 2019-06-03

## 2019-06-03 DIAGNOSIS — H90.3 SENSORINEURAL HEARING LOSS, BILATERAL: Primary | ICD-10-CM

## 2019-06-03 PROCEDURE — V5267 HEARING AID SUP/ACCESS/DEV: HCPCS | Performed by: AUDIOLOGIST

## 2019-06-03 PROCEDURE — 92593 HEARING AID CHECK, BOTH EARS: CPT | Performed by: AUDIOLOGIST

## 2019-06-04 NOTE — PROGRESS NOTES
HEARING AID FOLLOW-UP    Prerna Tinsley  6/15/1935  [id number]    Otoscopic Inspection:  both ears: TM partially visualized and excessive cerumen    Patient advised to have cerumen removal    Hearing Aid Information       Right    Left   Make: Ashlie Saini

## 2019-06-07 ENCOUNTER — OFFICE VISIT (OUTPATIENT)
Dept: OTOLARYNGOLOGY | Facility: CLINIC | Age: 84
End: 2019-06-07
Payer: MEDICARE

## 2019-06-07 VITALS — HEIGHT: 62 IN | BODY MASS INDEX: 22 KG/M2 | SYSTOLIC BLOOD PRESSURE: 140 MMHG | DIASTOLIC BLOOD PRESSURE: 66 MMHG

## 2019-06-07 DIAGNOSIS — H61.23 BILATERAL IMPACTED CERUMEN: Primary | ICD-10-CM

## 2019-06-07 PROCEDURE — 69210 REMOVE IMPACTED EAR WAX UNI: CPT | Performed by: OTOLARYNGOLOGY

## 2019-06-10 NOTE — PROGRESS NOTES
Alejandro Otero is a 82year old female. Patient presents with:  Ear Problem: Bilateral ear cleaning     HPI:   Her hearing aids are not working quite as effectively.   Her hearing does seem like it is diminished    Current Outpatient Medications:  VIELKA displacement of the fracture fragments, longitudinal nondisplaced fractures of the pubic rami superior and inferior   • Hyperlipidemia    • Osteopenia    • Pain    • Perforated peptic ulcer ([facility] [address] 1991   • Polymyalgia rheumatica ([facility] [address]    • Portacath in plac indicates understanding of these issues and agrees to the plan. Eduar Edwards MD  6/10/2019  6:16 AM

## 2019-06-12 ENCOUNTER — OFFICE VISIT (OUTPATIENT)
Dept: HEMATOLOGY/ONCOLOGY | Facility: HOSPITAL | Age: 84
End: 2019-06-12
Attending: INTERNAL MEDICINE
Payer: MEDICARE

## 2019-06-12 VITALS
WEIGHT: 124.63 LBS | DIASTOLIC BLOOD PRESSURE: 65 MMHG | HEIGHT: 62 IN | BODY MASS INDEX: 22.93 KG/M2 | RESPIRATION RATE: 16 BRPM | HEART RATE: 75 BPM | SYSTOLIC BLOOD PRESSURE: 172 MMHG | TEMPERATURE: 98 F

## 2019-06-12 DIAGNOSIS — H90.3 SENSORINEURAL HEARING LOSS, BILATERAL: ICD-10-CM

## 2019-06-12 DIAGNOSIS — M81.0 AGE-RELATED OSTEOPOROSIS WITHOUT CURRENT PATHOLOGICAL FRACTURE: ICD-10-CM

## 2019-06-12 DIAGNOSIS — D64.9 ANEMIA, UNSPECIFIED TYPE: ICD-10-CM

## 2019-06-12 DIAGNOSIS — Z79.811 ENCOUNTER FOR MONITORING AROMATASE INHIBITOR THERAPY: ICD-10-CM

## 2019-06-12 DIAGNOSIS — C50.212 MALIGNANT NEOPLASM OF UPPER-INNER QUADRANT OF LEFT BREAST IN FEMALE, ESTROGEN RECEPTOR POSITIVE (HCC): Primary | ICD-10-CM

## 2019-06-12 DIAGNOSIS — Z51.81 ENCOUNTER FOR MONITORING AROMATASE INHIBITOR THERAPY: ICD-10-CM

## 2019-06-12 DIAGNOSIS — Z17.0 MALIGNANT NEOPLASM OF UPPER-INNER QUADRANT OF LEFT BREAST IN FEMALE, ESTROGEN RECEPTOR POSITIVE (HCC): Primary | ICD-10-CM

## 2019-06-12 PROCEDURE — 99214 OFFICE O/P EST MOD 30 MIN: CPT | Performed by: INTERNAL MEDICINE

## 2019-06-12 NOTE — PROGRESS NOTES
Spoke with Abby Nam-- per Dr. Alejandro Conley iron level nl-- pt to continuing following with Dr. Nadia Sprague and to continue taking ferrous sulfate 325 mg three times a week-- mon/ wed/fri. Elevated ferritin likely reactive to arthritis.

## 2019-06-13 ENCOUNTER — APPOINTMENT (OUTPATIENT)
Dept: HEMATOLOGY/ONCOLOGY | Facility: HOSPITAL | Age: 84
End: 2019-06-13
Attending: INTERNAL MEDICINE
Payer: MEDICARE

## 2019-06-23 PROBLEM — M81.0 AGE RELATED OSTEOPOROSIS: Status: ACTIVE | Noted: 2019-06-23

## 2019-06-23 NOTE — PROGRESS NOTES
HPI    6/12/2019  Mirna Oliveira is a 82year old female seen in follow-up today with her daughter for  her left breast cancer and anemia.   Patient had a left mastectomy and sentinel node 5/23/2003 for a pathologic stage T1c N0 M0, ER 14 KY< 10, high the microcalcifications and then consider surgery to try and save the breast.          Review of Systems:     Review of Systems   Constitutional: Negative for activity change, appetite change, chills and fever.         Patient was the primary caregiver for lisinopril 10 MG Oral Tab Take 10 mg by mouth daily. Disp:  Rfl:    Atorvastatin Calcium 40 MG Oral Tab Take 40 mg by mouth nightly. Disp:  Rfl:    metoprolol Tartrate (LOPRESSOR) 25 MG Oral Tab Take 25 mg by mouth 2 (two) times daily.    Disp:  Rfl:    p [address] [place] [address] ENDOSCOPY   • EGD  07/2017    minimal erosive gastritis, esophagus normal   • ESOPHAGOGASTRODUODENOSCOPY (EGD) N/A 12/7/2017    Performed by Teressa Irving MD at [address] [place] [address] ENDOSCOPY   • ESOPHAGOGASTRODUODENOSCOPY (EGD) N/A 7/21/2017    Performed by Ronny Sanabria Not Asked        Caffeine Concern: Yes          two cups coffee tea daily        Occupational Exposure: Not Asked        Hobby Hazards: Not Asked        Sleep Concern: Not Asked        Stress Concern: Not Asked        Weight Concern: Not Asked        Speci nerve deficit. Skin: Skin is warm and dry. No erythema. Psychiatric: She has a normal mood and affect. Her behavior is normal. Judgment and thought content normal.   Nursing note and vitals reviewed.           ASSESSMENT/PLAN:   Malignant neoplasm of up Rng & Units 6/12/2019 5/28/2019 5/21/2019   WBC      4.0 - 11.0 x10(3) uL 11.1 (H)  11.1 (H)   RBC      3.80 - 5.30 x10(6)uL 3.33 (L)  3.15 (L)   Hemoglobin      12.0 - 16.0 g/dL 10.7 (L)  9.9 (L)   Hematocrit      35.0 - 48.0 % 33.4 (L)  31.8 (L)   MCV Globulin      2.8 - 4.4 g/dL   3.3   A/G Ratio      1.0 - 2.0   1.0   Color Urine      Yellow   Yellow   CLARITY URINE      Clear   Clear   Spec Gravity      1.002 - 1.035   1.015   PH Urine      5.0 - 8.0   7.0   Protein Urine      Negative mg/dL   Nega Left hip joint space is fairly well-preserved. Multiple foci of heterotopic bone formation in the subcutaneous   tissues about the left hip. 3. There is a now nondisplaced fracture of the inferior most transversely oriented internal fixation screw.  It is

## 2019-06-24 ENCOUNTER — TELEPHONE (OUTPATIENT)
Dept: HEMATOLOGY/ONCOLOGY | Facility: HOSPITAL | Age: 84
End: 2019-06-24

## 2019-06-24 NOTE — TELEPHONE ENCOUNTER
Corrina Leigh daughter called back returning Dr. Jazmin Elizalde call in regards to her mothers blood test. Please advise

## 2019-06-25 NOTE — TELEPHONE ENCOUNTER
Called Marquetta Hamman Hartnett's daughter and reviewed her labs again  Advised her to continue taking the ferrous sulfate 3 times a week since her hemoglobin did rise on the iron therapy. Patient's nutrition is likely to be compromised somewhat from her living alone with ongoing pain from her left hip. Darlene Mansfield will arrange for multivitamins and encouraged her mother.

## 2019-07-05 ENCOUNTER — TELEPHONE (OUTPATIENT)
Dept: OTOLARYNGOLOGY | Facility: CLINIC | Age: 84
End: 2019-07-05

## 2019-07-05 NOTE — TELEPHONE ENCOUNTER
Dr Jennifer Fry Patient's daughter stated that patient cannot hear with hearing aid to left ear ,no drainage noted but right ear can with hearing aid or without,no fever,please advise.

## 2019-07-05 NOTE — TELEPHONE ENCOUNTER
Pt's daughter called. Pt was seen on 6-7-19 ear wax was removed from both ears. Today pt is unable to hear out of the left with and without the hearing aid. Caller is not sure if she should see ent or audiology.   Please call to advise

## 2019-07-05 NOTE — TELEPHONE ENCOUNTER
See audiologist when an ENT is here. If hearing aid normal then we can see the patient.  If hearing aid purchased by an outside audiologist then they will ehave to talk hearing aid to that audiologist first .

## 2019-07-06 NOTE — TELEPHONE ENCOUNTER
Yessi Bailey see note below and Rn spoke to daughter and stated that hearing aids were purchased here.  Advised as per JDO to have the hearing aid check first but pt daughter said that opening to see audiologist will be until August,patient's Janina Bello

## 2019-07-08 ENCOUNTER — OFFICE VISIT (OUTPATIENT)
Dept: AUDIOLOGY | Facility: CLINIC | Age: 84
End: 2019-07-08
Payer: MEDICARE

## 2019-07-08 ENCOUNTER — OFFICE VISIT (OUTPATIENT)
Dept: OTOLARYNGOLOGY | Facility: CLINIC | Age: 84
End: 2019-07-08
Payer: MEDICARE

## 2019-07-08 VITALS
TEMPERATURE: 98 F | HEIGHT: 62 IN | SYSTOLIC BLOOD PRESSURE: 128 MMHG | WEIGHT: 124 LBS | BODY MASS INDEX: 22.82 KG/M2 | DIASTOLIC BLOOD PRESSURE: 64 MMHG

## 2019-07-08 DIAGNOSIS — H90.3 SENSORINEURAL HEARING LOSS, BILATERAL: Primary | ICD-10-CM

## 2019-07-08 DIAGNOSIS — H91.22 SUDDEN LEFT HEARING LOSS: Primary | ICD-10-CM

## 2019-07-08 PROCEDURE — 92557 COMPREHENSIVE HEARING TEST: CPT | Performed by: AUDIOLOGIST

## 2019-07-08 PROCEDURE — 99213 OFFICE O/P EST LOW 20 MIN: CPT | Performed by: OTOLARYNGOLOGY

## 2019-07-08 PROCEDURE — G0463 HOSPITAL OUTPT CLINIC VISIT: HCPCS | Performed by: OTOLARYNGOLOGY

## 2019-07-08 PROCEDURE — 92593 HEARING AID CHECK, BOTH EARS: CPT | Performed by: AUDIOLOGIST

## 2019-07-08 PROCEDURE — 92567 TYMPANOMETRY: CPT | Performed by: AUDIOLOGIST

## 2019-07-08 NOTE — PROGRESS NOTES
Stacey Isidro is a 82year old female. Patient presents with:  Hearing Loss: in left ear for 1 week     HPI:   About 1 week ago suddenly lost hearing in her left ear. There is no pain.   She denies any antecedent URI symptoms    Current Outpatient Medi left femur, subtrochanteric with overriding and displacement of the fracture fragments, longitudinal nondisplaced fractures of the pubic rami superior and inferior   • Hyperlipidemia    • Malignant neoplasm of upper-inner quadrant of left breast in female, Normal. Pupil - Right: Normal, Left: Normal.    Ears Normal Inspection - Right: Normal, Left: Normal. Canal - Left: Normal. TM - Right: Normal, Left: Normal.  Tympanic membrane is clear and intact.   Audiogram shows severe sensorineural hearing loss in the

## 2019-07-08 NOTE — PROGRESS NOTES
AUDIOGRAM     Vanessa Tinsley was referred for testing by Arleth ESTEVES   6/15/1935  [id number]    Patient was seen as add-on. Patient's family reported a sudden hearing loss on left side.   They were not sure if it was hearing or hearing ai

## 2019-08-08 DIAGNOSIS — C50.212 BREAST CANCER OF UPPER-INNER QUADRANT OF LEFT FEMALE BREAST (HCC): ICD-10-CM

## 2019-08-09 RX ORDER — ANASTROZOLE 1 MG/1
TABLET ORAL
Qty: 90 TABLET | Refills: 3 | Status: SHIPPED | OUTPATIENT
Start: 2019-08-09 | End: 2020-08-14

## 2019-11-08 ENCOUNTER — LAB REQUISITION (OUTPATIENT)
Dept: LAB | Facility: HOSPITAL | Age: 84
End: 2019-11-08
Payer: MEDICARE

## 2019-11-08 DIAGNOSIS — N18.30 CHRONIC KIDNEY DISEASE, STAGE 3 (MODERATE): ICD-10-CM

## 2019-11-08 DIAGNOSIS — D64.9 ANEMIA, UNSPECIFIED: ICD-10-CM

## 2019-11-08 DIAGNOSIS — E78.00 PURE HYPERCHOLESTEROLEMIA, UNSPECIFIED: ICD-10-CM

## 2019-11-08 PROCEDURE — 80061 LIPID PANEL: CPT | Performed by: INTERNAL MEDICINE

## 2019-11-08 PROCEDURE — 80048 BASIC METABOLIC PNL TOTAL CA: CPT | Performed by: INTERNAL MEDICINE

## 2019-11-08 PROCEDURE — 84460 ALANINE AMINO (ALT) (SGPT): CPT | Performed by: INTERNAL MEDICINE

## 2019-11-08 PROCEDURE — 85025 COMPLETE CBC W/AUTO DIFF WBC: CPT | Performed by: INTERNAL MEDICINE

## 2020-08-08 DIAGNOSIS — C50.212 BREAST CANCER OF UPPER-INNER QUADRANT OF LEFT FEMALE BREAST (HCC): ICD-10-CM

## 2020-08-14 RX ORDER — ANASTROZOLE 1 MG/1
TABLET ORAL
Qty: 90 TABLET | Refills: 0 | Status: SHIPPED | OUTPATIENT
Start: 2020-08-14 | End: 2020-08-22 | Stop reason: ALTCHOICE

## 2020-08-17 ENCOUNTER — TELEPHONE (OUTPATIENT)
Dept: HEMATOLOGY/ONCOLOGY | Facility: HOSPITAL | Age: 85
End: 2020-08-17

## 2020-08-17 DIAGNOSIS — Z17.0 MALIGNANT NEOPLASM OF UPPER-INNER QUADRANT OF LEFT BREAST IN FEMALE, ESTROGEN RECEPTOR POSITIVE (HCC): ICD-10-CM

## 2020-08-17 DIAGNOSIS — K29.70 HELICOBACTER PYLORI GASTRITIS: ICD-10-CM

## 2020-08-17 DIAGNOSIS — B96.81 HELICOBACTER PYLORI GASTRITIS: ICD-10-CM

## 2020-08-17 DIAGNOSIS — Z78.0 ASYMPTOMATIC MENOPAUSE: ICD-10-CM

## 2020-08-17 DIAGNOSIS — E78.2 MIXED HYPERLIPIDEMIA: ICD-10-CM

## 2020-08-17 DIAGNOSIS — C50.212 MALIGNANT NEOPLASM OF UPPER-INNER QUADRANT OF LEFT BREAST IN FEMALE, ESTROGEN RECEPTOR POSITIVE (HCC): ICD-10-CM

## 2020-08-17 DIAGNOSIS — D50.8 OTHER IRON DEFICIENCY ANEMIA: Primary | ICD-10-CM

## 2020-08-17 DIAGNOSIS — M81.0 AGE-RELATED OSTEOPOROSIS WITHOUT CURRENT PATHOLOGICAL FRACTURE: ICD-10-CM

## 2020-08-17 DIAGNOSIS — R53.1 WEAKNESS: ICD-10-CM

## 2020-08-17 NOTE — TELEPHONE ENCOUNTER
Asad Kelsey called to let Dr Nazia Walker know that Saima Garcia is afraid to come to the office for an appointment on 8/20/20 Darlene Juárez wants to know if Dr Nazia Walker could do a video visit or telephone visit? Asad Kelsey is requesting a call back at [phone].

## 2020-08-17 NOTE — TELEPHONE ENCOUNTER
Called daughter - advised coming to the appointment   Will obtain labs   Daughter believes she is more pale and eating poorly unless family is there with her   She has a previous history of blood loss

## 2020-08-20 ENCOUNTER — OFFICE VISIT (OUTPATIENT)
Dept: HEMATOLOGY/ONCOLOGY | Facility: HOSPITAL | Age: 85
End: 2020-08-20
Attending: INTERNAL MEDICINE
Payer: MEDICARE

## 2020-08-20 VITALS
HEIGHT: 62 IN | DIASTOLIC BLOOD PRESSURE: 56 MMHG | SYSTOLIC BLOOD PRESSURE: 119 MMHG | TEMPERATURE: 99 F | RESPIRATION RATE: 16 BRPM | WEIGHT: 114 LBS | HEART RATE: 84 BPM | OXYGEN SATURATION: 99 % | BODY MASS INDEX: 20.98 KG/M2

## 2020-08-20 DIAGNOSIS — Z17.0 MALIGNANT NEOPLASM OF UPPER-INNER QUADRANT OF LEFT BREAST IN FEMALE, ESTROGEN RECEPTOR POSITIVE (HCC): Primary | ICD-10-CM

## 2020-08-20 DIAGNOSIS — D64.9 ANEMIA, UNSPECIFIED TYPE: ICD-10-CM

## 2020-08-20 DIAGNOSIS — C50.212 MALIGNANT NEOPLASM OF UPPER-INNER QUADRANT OF LEFT BREAST IN FEMALE, ESTROGEN RECEPTOR POSITIVE (HCC): ICD-10-CM

## 2020-08-20 DIAGNOSIS — R53.1 WEAKNESS: ICD-10-CM

## 2020-08-20 DIAGNOSIS — D63.8 ANEMIA OF CHRONIC DISEASE: ICD-10-CM

## 2020-08-20 DIAGNOSIS — E78.2 MIXED HYPERLIPIDEMIA: ICD-10-CM

## 2020-08-20 DIAGNOSIS — M81.0 AGE-RELATED OSTEOPOROSIS WITHOUT CURRENT PATHOLOGICAL FRACTURE: ICD-10-CM

## 2020-08-20 DIAGNOSIS — Z17.0 MALIGNANT NEOPLASM OF UPPER-INNER QUADRANT OF LEFT BREAST IN FEMALE, ESTROGEN RECEPTOR POSITIVE (HCC): ICD-10-CM

## 2020-08-20 DIAGNOSIS — C50.212 MALIGNANT NEOPLASM OF UPPER-INNER QUADRANT OF LEFT BREAST IN FEMALE, ESTROGEN RECEPTOR POSITIVE (HCC): Primary | ICD-10-CM

## 2020-08-20 LAB
ALBUMIN SERPL-MCNC: 3.2 G/DL (ref 3.4–5)
ALBUMIN/GLOB SERPL: 0.9 {RATIO} (ref 1–2)
ALP LIVER SERPL-CCNC: 81 U/L (ref 55–142)
ALT SERPL-CCNC: 14 U/L (ref 13–56)
ANION GAP SERPL CALC-SCNC: 10 MMOL/L (ref 0–18)
AST SERPL-CCNC: 12 U/L (ref 15–37)
BASOPHILS # BLD AUTO: 0.08 X10(3) UL (ref 0–0.2)
BASOPHILS NFR BLD AUTO: 0.7 %
BILIRUB SERPL-MCNC: 0.4 MG/DL (ref 0.1–2)
BUN BLD-MCNC: 45 MG/DL (ref 7–18)
BUN/CREAT SERPL: 27.8 (ref 10–20)
CALCIUM BLD-MCNC: 9.1 MG/DL (ref 8.5–10.1)
CHLORIDE SERPL-SCNC: 110 MMOL/L (ref 98–112)
CHOLEST SMN-MCNC: 150 MG/DL (ref ?–200)
CO2 SERPL-SCNC: 23 MMOL/L (ref 21–32)
CREAT BLD-MCNC: 1.62 MG/DL (ref 0.55–1.02)
DEPRECATED RDW RBC AUTO: 49.9 FL (ref 35.1–46.3)
EOSINOPHIL # BLD AUTO: 0.12 X10(3) UL (ref 0–0.7)
EOSINOPHIL NFR BLD AUTO: 1 %
ERYTHROCYTE [DISTWIDTH] IN BLOOD BY AUTOMATED COUNT: 14 % (ref 11–15)
GLOBULIN PLAS-MCNC: 3.7 G/DL (ref 2.8–4.4)
GLUCOSE BLD-MCNC: 138 MG/DL (ref 70–99)
HCT VFR BLD AUTO: 33.6 % (ref 35–48)
HDLC SERPL-MCNC: 82 MG/DL (ref 40–59)
HGB BLD-MCNC: 10.8 G/DL (ref 12–16)
IMM GRANULOCYTES # BLD AUTO: 0.07 X10(3) UL (ref 0–1)
IMM GRANULOCYTES NFR BLD: 0.6 %
IRON SATURATION: 23 % (ref 15–50)
IRON SERPL-MCNC: 52 UG/DL (ref 50–170)
LDLC SERPL CALC-MCNC: 51 MG/DL (ref ?–100)
LYMPHOCYTES # BLD AUTO: 0.82 X10(3) UL (ref 1–4)
LYMPHOCYTES NFR BLD AUTO: 6.9 %
M PROTEIN MFR SERPL ELPH: 6.9 G/DL (ref 6.4–8.2)
MCH RBC QN AUTO: 31.2 PG (ref 26–34)
MCHC RBC AUTO-ENTMCNC: 32.1 G/DL (ref 31–37)
MCV RBC AUTO: 97.1 FL (ref 80–100)
MONOCYTES # BLD AUTO: 0.59 X10(3) UL (ref 0.1–1)
MONOCYTES NFR BLD AUTO: 5 %
NEUTROPHILS # BLD AUTO: 10.2 X10 (3) UL (ref 1.5–7.7)
NEUTROPHILS # BLD AUTO: 10.2 X10(3) UL (ref 1.5–7.7)
NEUTROPHILS NFR BLD AUTO: 85.8 %
NONHDLC SERPL-MCNC: 68 MG/DL (ref ?–130)
OSMOLALITY SERPL CALC.SUM OF ELEC: 310 MOSM/KG (ref 275–295)
PLATELET # BLD AUTO: 165 10(3)UL (ref 150–450)
POTASSIUM SERPL-SCNC: 4.6 MMOL/L (ref 3.5–5.1)
RBC # BLD AUTO: 3.46 X10(6)UL (ref 3.8–5.3)
SODIUM SERPL-SCNC: 143 MMOL/L (ref 136–145)
TOTAL IRON BINDING CAPACITY: 228 UG/DL (ref 240–450)
TRANSFERRIN SERPL-MCNC: 153 MG/DL (ref 200–360)
TRIGL SERPL-MCNC: 84 MG/DL (ref 30–149)
TSI SER-ACNC: 1.29 MIU/ML (ref 0.36–3.74)
VLDLC SERPL CALC-MCNC: 17 MG/DL (ref 0–30)
WBC # BLD AUTO: 11.9 X10(3) UL (ref 4–11)

## 2020-08-20 PROCEDURE — 84466 ASSAY OF TRANSFERRIN: CPT

## 2020-08-20 PROCEDURE — 80053 COMPREHEN METABOLIC PANEL: CPT

## 2020-08-20 PROCEDURE — 80061 LIPID PANEL: CPT

## 2020-08-20 PROCEDURE — 99214 OFFICE O/P EST MOD 30 MIN: CPT | Performed by: INTERNAL MEDICINE

## 2020-08-20 PROCEDURE — 84443 ASSAY THYROID STIM HORMONE: CPT

## 2020-08-20 PROCEDURE — 36415 COLL VENOUS BLD VENIPUNCTURE: CPT

## 2020-08-20 PROCEDURE — 85025 COMPLETE CBC W/AUTO DIFF WBC: CPT

## 2020-08-20 PROCEDURE — 83540 ASSAY OF IRON: CPT

## 2020-08-20 NOTE — PROGRESS NOTES
HPI    8/20/2020  Sy Rasmussen is a 82year old female seen in follow-up today with her daughter after our phone conversation. Her daughter had indicated that mom was pale and had lost weight.   Patient has been eating only small amounts unless fami BIOPSIES OF THE BREAST   -  DCIS solid type high nuclear grade      5/21/2003 Surgery     LEFT BREAST  -  wide excision microcalcifications      5/23/2003 Surgery     LEFT MASTECTOMY AND SENTINEL NODE  -  1.4 cm mod differentiated infiltrating ductal carci easily. Psychiatric/Behavioral: The patient is nervous/anxious. Current Outpatient Medications   Medication Sig Dispense Refill   • Calcium Carbonate-Vitamin D (CALTRATE 600+D OR) Take by mouth.      • ferrous sulfate 325 (65 FE) MG Oral Tab EC T Pain    • Perforated peptic ulcer ([facility] 1991   • Polymyalgia rheumatica ([facility]    • Portacath in place 2003    venous access   • S/P colonoscopy 2005    mild diverticulosis / Internal hemorrhoid     Past Surgical History:   Procedure Laterality Date   • CRISTAL meetings of clubs or organizations: Not on file        Relationship status: Not on file      Intimate partner violence:        Fear of current or ex partner: Not on file        Emotionally abused: Not on file        Physically abused: Not on file        Fo She exhibits no tenderness. Status post bilateral mastectomies with no evidence of chest wall recurrence on the left   Abdominal: Soft. Bowel sounds are normal. She exhibits no distension. There is no tenderness. There is no guarding.    Musculoskeletal: that there are no additional procedures available for her left femur repair. We have discussed possible removal of the port a cath. Patient is reluctant to consider additional procedures including flushing of the Port-A-Cath.       Patient did have labs 112 mmol/L  110    Carbon Dioxide, Total      21.0 - 32.0 mmol/L  23.0    ANION GAP      0 - 18 mmol/L  10    BUN      7 - 18 mg/dL  45 (H)    CREATININE      0.55 - 1.02 mg/dL  1.62 (H)    BUN/CREAT Ratio      10.0 - 20.0  27.8 (H)    CALCIUM      8.5 - 1 hip joint space is fairly well-preserved. Multiple foci of heterotopic bone formation in the subcutaneous tissues about the left hip. 3. There is a now nondisplaced fracture of the inferior most transversely oriented internal fixation screw.  It is located

## 2020-08-22 PROBLEM — M81.0 AGE-RELATED OSTEOPOROSIS WITHOUT CURRENT PATHOLOGICAL FRACTURE: Status: ACTIVE | Noted: 2020-08-22

## 2020-08-28 DIAGNOSIS — N17.9 ACUTE KIDNEY INJURY (HCC): Primary | ICD-10-CM

## 2020-08-28 DIAGNOSIS — N17.9 ACUTE KIDNEY INJURY (HCC): ICD-10-CM

## 2020-08-28 DIAGNOSIS — N30.00 ACUTE CYSTITIS WITHOUT HEMATURIA: Primary | ICD-10-CM

## 2020-08-29 ENCOUNTER — LAB ENCOUNTER (OUTPATIENT)
Dept: LAB | Facility: HOSPITAL | Age: 85
End: 2020-08-29
Attending: INTERNAL MEDICINE
Payer: MEDICARE

## 2020-08-29 DIAGNOSIS — N17.9 ACUTE KIDNEY INJURY (HCC): ICD-10-CM

## 2020-08-29 DIAGNOSIS — N30.00 ACUTE CYSTITIS WITHOUT HEMATURIA: ICD-10-CM

## 2020-08-29 LAB
ANION GAP SERPL CALC-SCNC: 9 MMOL/L (ref 0–18)
BACTERIA UR QL AUTO: NEGATIVE /HPF
BILIRUB UR QL: NEGATIVE
BUN BLD-MCNC: 42 MG/DL (ref 7–18)
BUN/CREAT SERPL: 27.3 (ref 10–20)
CALCIUM BLD-MCNC: 9 MG/DL (ref 8.5–10.1)
CHLORIDE SERPL-SCNC: 107 MMOL/L (ref 98–112)
CLARITY UR: CLEAR
CO2 SERPL-SCNC: 26 MMOL/L (ref 21–32)
COLOR UR: YELLOW
CREAT BLD-MCNC: 1.54 MG/DL (ref 0.55–1.02)
GLUCOSE BLD-MCNC: 116 MG/DL (ref 70–99)
GLUCOSE UR-MCNC: NEGATIVE MG/DL
HGB UR QL STRIP.AUTO: NEGATIVE
KETONES UR-MCNC: NEGATIVE MG/DL
NITRITE UR QL STRIP.AUTO: NEGATIVE
OSMOLALITY SERPL CALC.SUM OF ELEC: 305 MOSM/KG (ref 275–295)
PATIENT FASTING Y/N/NP: NO
PH UR: 6 [PH] (ref 5–8)
POTASSIUM SERPL-SCNC: 3.9 MMOL/L (ref 3.5–5.1)
PROT UR-MCNC: NEGATIVE MG/DL
RBC #/AREA URNS AUTO: <1 /HPF
SODIUM SERPL-SCNC: 142 MMOL/L (ref 136–145)
SP GR UR STRIP: 1.01 (ref 1–1.03)
UROBILINOGEN UR STRIP-ACNC: <2
WBC #/AREA URNS AUTO: 8 /HPF

## 2020-08-29 PROCEDURE — 87086 URINE CULTURE/COLONY COUNT: CPT

## 2020-08-29 PROCEDURE — 81001 URINALYSIS AUTO W/SCOPE: CPT

## 2020-08-29 PROCEDURE — 36415 COLL VENOUS BLD VENIPUNCTURE: CPT

## 2020-08-29 PROCEDURE — 80048 BASIC METABOLIC PNL TOTAL CA: CPT

## 2020-09-12 NOTE — PLAN OF CARE
CARDIOVASCULAR - ADULT    • Maintains optimal cardiac output and hemodynamic stability Progressing    • Absence of cardiac arrhythmias or at baseline Progressing        GASTROINTESTINAL - ADULT    • Minimal or absence of nausea and vomiting Progressing no

## 2020-09-14 ENCOUNTER — HOSPITAL ENCOUNTER (OUTPATIENT)
Dept: ULTRASOUND IMAGING | Facility: HOSPITAL | Age: 85
Discharge: HOME | End: 2020-09-14
Attending: INTERNAL MEDICINE
Payer: MEDICARE

## 2020-09-14 DIAGNOSIS — N17.9 ACUTE KIDNEY INJURY (HCC): ICD-10-CM

## 2020-09-14 PROCEDURE — 76770 US EXAM ABDO BACK WALL COMP: CPT | Performed by: INTERNAL MEDICINE

## 2021-02-03 ENCOUNTER — IMMUNIZATION (OUTPATIENT)
Dept: LAB | Facility: HOSPITAL | Age: 86
End: 2021-02-03
Attending: EMERGENCY MEDICINE
Payer: MEDICARE

## 2021-02-03 DIAGNOSIS — Z23 NEED FOR VACCINATION: Primary | ICD-10-CM

## 2021-02-03 PROCEDURE — 0011A SARSCOV2 VAC 100MCG/0.5ML IM: CPT

## 2021-03-03 ENCOUNTER — IMMUNIZATION (OUTPATIENT)
Dept: LAB | Facility: HOSPITAL | Age: 86
End: 2021-03-03
Attending: EMERGENCY MEDICINE
Payer: MEDICARE

## 2021-03-03 DIAGNOSIS — Z23 NEED FOR VACCINATION: Primary | ICD-10-CM

## 2021-03-03 PROCEDURE — 0012A SARSCOV2 VAC 100MCG/0.5ML IM: CPT

## 2021-03-30 ENCOUNTER — OFFICE VISIT (OUTPATIENT)
Dept: OTOLARYNGOLOGY | Facility: CLINIC | Age: 86
End: 2021-03-30
Payer: MEDICARE

## 2021-03-30 ENCOUNTER — OFFICE VISIT (OUTPATIENT)
Dept: AUDIOLOGY | Facility: CLINIC | Age: 86
End: 2021-03-30

## 2021-03-30 VITALS
WEIGHT: 114 LBS | TEMPERATURE: 99 F | SYSTOLIC BLOOD PRESSURE: 164 MMHG | BODY MASS INDEX: 20.98 KG/M2 | DIASTOLIC BLOOD PRESSURE: 68 MMHG | HEIGHT: 62 IN

## 2021-03-30 DIAGNOSIS — H61.23 BILATERAL IMPACTED CERUMEN: ICD-10-CM

## 2021-03-30 DIAGNOSIS — H91.93 BILATERAL HEARING LOSS, UNSPECIFIED HEARING LOSS TYPE: Primary | ICD-10-CM

## 2021-03-30 DIAGNOSIS — H90.3 SENSORINEURAL HEARING LOSS, BILATERAL: Primary | ICD-10-CM

## 2021-03-30 PROCEDURE — V5267 HEARING AID SUP/ACCESS/DEV: HCPCS | Performed by: AUDIOLOGIST

## 2021-03-30 PROCEDURE — 99070 SPECIAL SUPPLIES PHYS/QHP: CPT | Performed by: AUDIOLOGIST

## 2021-03-30 PROCEDURE — 69210 REMOVE IMPACTED EAR WAX UNI: CPT | Performed by: OTOLARYNGOLOGY

## 2021-03-30 PROCEDURE — 92593 HEARING AID CHECK, BOTH EARS: CPT | Performed by: AUDIOLOGIST

## 2021-03-30 NOTE — PROGRESS NOTES
HEARING AID FOLLOW-UP    Stevie Tinsley  6/15/1935  [id number]    Otoscopic Inspection:  both ears: excessive cerumen and TM could not be visualized  Dr. Jerry Salinas removed later after this appointment today.     Hearing Aid Information       Right    Left

## 2021-04-14 NOTE — PROGRESS NOTES
Hearing Aid Fitting    Frandy Self purchased two hearing aids. She is accompanied to this appointment by her daughter  The hearing aid fitting was completed by Leila Ang       Hearing Aid Information       Right    Left   Make: Oticon Make Bilobed Flap Text: The defect edges were debeveled with a #15 scalpel blade.  Given the location of the defect and the proximity to free margins a bilobe flap was deemed most appropriate.  Using a sterile surgical marker, an appropriate bilobe flap drawn around the defect.    The area thus outlined was incised deep to adipose tissue with a #15 scalpel blade.  The skin margins were undermined to an appropriate distance in all directions utilizing iris scissors.

## 2021-04-22 ENCOUNTER — LAB ENCOUNTER (OUTPATIENT)
Dept: LAB | Facility: HOSPITAL | Age: 86
End: 2021-04-22
Attending: INTERNAL MEDICINE
Payer: MEDICARE

## 2021-04-22 DIAGNOSIS — N18.30 CHRONIC KIDNEY DISEASE, STAGE III (MODERATE) (HCC): ICD-10-CM

## 2021-04-22 DIAGNOSIS — E78.00 PURE HYPERCHOLESTEROLEMIA: ICD-10-CM

## 2021-04-22 DIAGNOSIS — D64.9 ANEMIA, UNSPECIFIED: Primary | ICD-10-CM

## 2021-04-22 PROCEDURE — 84466 ASSAY OF TRANSFERRIN: CPT

## 2021-04-22 PROCEDURE — 82570 ASSAY OF URINE CREATININE: CPT

## 2021-04-22 PROCEDURE — 83540 ASSAY OF IRON: CPT

## 2021-04-22 PROCEDURE — 80061 LIPID PANEL: CPT

## 2021-04-22 PROCEDURE — 81001 URINALYSIS AUTO W/SCOPE: CPT

## 2021-04-22 PROCEDURE — 82728 ASSAY OF FERRITIN: CPT

## 2021-04-22 PROCEDURE — 85025 COMPLETE CBC W/AUTO DIFF WBC: CPT

## 2021-04-22 PROCEDURE — 36415 COLL VENOUS BLD VENIPUNCTURE: CPT

## 2021-04-22 PROCEDURE — 82043 UR ALBUMIN QUANTITATIVE: CPT

## 2021-04-22 PROCEDURE — 87086 URINE CULTURE/COLONY COUNT: CPT

## 2021-04-22 PROCEDURE — 80048 BASIC METABOLIC PNL TOTAL CA: CPT

## 2021-06-08 ENCOUNTER — OFFICE VISIT (OUTPATIENT)
Dept: OTOLARYNGOLOGY | Facility: CLINIC | Age: 86
End: 2021-06-08
Payer: MEDICARE

## 2021-06-08 ENCOUNTER — OFFICE VISIT (OUTPATIENT)
Dept: AUDIOLOGY | Facility: CLINIC | Age: 86
End: 2021-06-08

## 2021-06-08 VITALS — SYSTOLIC BLOOD PRESSURE: 109 MMHG | HEART RATE: 78 BPM | DIASTOLIC BLOOD PRESSURE: 46 MMHG

## 2021-06-08 DIAGNOSIS — H90.3 SENSORINEURAL HEARING LOSS, BILATERAL: Primary | ICD-10-CM

## 2021-06-08 DIAGNOSIS — H61.23 BILATERAL IMPACTED CERUMEN: Primary | ICD-10-CM

## 2021-06-08 PROCEDURE — 69210 REMOVE IMPACTED EAR WAX UNI: CPT | Performed by: OTOLARYNGOLOGY

## 2021-06-08 PROCEDURE — 92593 HEARING AID CHECK, BOTH EARS: CPT | Performed by: AUDIOLOGIST

## 2021-06-08 NOTE — PROGRESS NOTES
Erin Maldonado is a 82year old female. Patient presents with:  Cerumen Impaction: Pt is here to have both ears cleaned. Has wax build up on both sides. HPI:   She is going to get a hearing test done. She wants to make sure that her ears are clear. rami superior and inferior   • Hyperlipidemia    • Malignant neoplasm of upper-inner quadrant of left breast in female, estrogen receptor positive ([facility] [address] [facility] 3/26/2012   • Osteopenia    • Pain    • Perforated peptic ulcer ([facility] [facility] 1991   • Polymyalgia rheumatica ( INSTRUMENTATION, UNILATERAL      The patient indicates understanding of these issues and agrees to the plan. Salil V. Leocadia Mcburney, MD  6/8/2021  10:34 AM

## 2021-06-09 NOTE — PROGRESS NOTES
HEARING AID FOLLOW-UP    Hien Tinsley  6/15/1935  [id number]    Otoscopic Inspection:  both ears: no cerumen and TM visualized   Dr. Tracey Melgoza removed cerumen just prior to our appointment today      Patient is here today accompanied by her daughter    He check:good       warranty is expiring- letter was given to daughter and patient  This was discussed in office- they declined renewing hearing aid insurance at this time    Patient is to follow up in 1 year    6/9/2021  Lui White

## 2021-07-13 DIAGNOSIS — C50.212 MALIGNANT NEOPLASM OF UPPER-INNER QUADRANT OF LEFT BREAST IN FEMALE, ESTROGEN RECEPTOR POSITIVE (HCC): Primary | ICD-10-CM

## 2021-07-13 DIAGNOSIS — D64.9 ANEMIA, UNSPECIFIED TYPE: ICD-10-CM

## 2021-07-13 DIAGNOSIS — Z17.0 MALIGNANT NEOPLASM OF UPPER-INNER QUADRANT OF LEFT BREAST IN FEMALE, ESTROGEN RECEPTOR POSITIVE (HCC): Primary | ICD-10-CM

## 2021-07-13 DIAGNOSIS — C50.212 BREAST CANCER OF UPPER-INNER QUADRANT OF LEFT FEMALE BREAST (HCC): ICD-10-CM

## 2021-07-14 ENCOUNTER — APPOINTMENT (OUTPATIENT)
Dept: HEMATOLOGY/ONCOLOGY | Facility: HOSPITAL | Age: 86
End: 2021-07-14
Attending: INTERNAL MEDICINE
Payer: MEDICARE

## 2021-07-14 VITALS
DIASTOLIC BLOOD PRESSURE: 55 MMHG | OXYGEN SATURATION: 100 % | HEART RATE: 80 BPM | RESPIRATION RATE: 16 BRPM | HEIGHT: 62.01 IN | BODY MASS INDEX: 21.62 KG/M2 | WEIGHT: 119 LBS | SYSTOLIC BLOOD PRESSURE: 152 MMHG | TEMPERATURE: 98 F

## 2021-07-14 DIAGNOSIS — C50.212 MALIGNANT NEOPLASM OF UPPER-INNER QUADRANT OF LEFT BREAST IN FEMALE, ESTROGEN RECEPTOR POSITIVE (HCC): Primary | ICD-10-CM

## 2021-07-14 DIAGNOSIS — D64.9 ANEMIA, UNSPECIFIED TYPE: ICD-10-CM

## 2021-07-14 DIAGNOSIS — D53.9 MACROCYTIC ANEMIA: ICD-10-CM

## 2021-07-14 DIAGNOSIS — Z17.0 MALIGNANT NEOPLASM OF UPPER-INNER QUADRANT OF LEFT BREAST IN FEMALE, ESTROGEN RECEPTOR POSITIVE (HCC): Primary | ICD-10-CM

## 2021-07-14 DIAGNOSIS — Z90.13 STATUS POST BILATERAL MASTECTOMY: ICD-10-CM

## 2021-07-14 DIAGNOSIS — Z17.0 MALIGNANT NEOPLASM OF UPPER-INNER QUADRANT OF LEFT BREAST IN FEMALE, ESTROGEN RECEPTOR POSITIVE (HCC): ICD-10-CM

## 2021-07-14 DIAGNOSIS — C50.212 MALIGNANT NEOPLASM OF UPPER-INNER QUADRANT OF LEFT BREAST IN FEMALE, ESTROGEN RECEPTOR POSITIVE (HCC): ICD-10-CM

## 2021-07-14 DIAGNOSIS — N18.32 STAGE 3B CHRONIC KIDNEY DISEASE (HCC): ICD-10-CM

## 2021-07-14 DIAGNOSIS — C50.212 BREAST CANCER OF UPPER-INNER QUADRANT OF LEFT FEMALE BREAST (HCC): ICD-10-CM

## 2021-07-14 LAB
ALBUMIN SERPL-MCNC: 2.9 G/DL (ref 3.4–5)
ALBUMIN/GLOB SERPL: 0.8 {RATIO} (ref 1–2)
ALP LIVER SERPL-CCNC: 67 U/L
ALT SERPL-CCNC: 20 U/L
ANION GAP SERPL CALC-SCNC: 9 MMOL/L (ref 0–18)
AST SERPL-CCNC: 17 U/L (ref 15–37)
BASOPHILS # BLD AUTO: 0.04 X10(3) UL (ref 0–0.2)
BASOPHILS NFR BLD AUTO: 0.3 %
BILIRUB SERPL-MCNC: 0.4 MG/DL (ref 0.1–2)
BUN BLD-MCNC: 41 MG/DL (ref 7–18)
BUN/CREAT SERPL: 32.3 (ref 10–20)
CALCIUM BLD-MCNC: 9.4 MG/DL (ref 8.5–10.1)
CHLORIDE SERPL-SCNC: 108 MMOL/L (ref 98–112)
CO2 SERPL-SCNC: 25 MMOL/L (ref 21–32)
CREAT BLD-MCNC: 1.27 MG/DL
DEPRECATED RDW RBC AUTO: 53.7 FL (ref 35.1–46.3)
EOSINOPHIL # BLD AUTO: 0.17 X10(3) UL (ref 0–0.7)
EOSINOPHIL NFR BLD AUTO: 1.4 %
ERYTHROCYTE [DISTWIDTH] IN BLOOD BY AUTOMATED COUNT: 14.4 % (ref 11–15)
GLOBULIN PLAS-MCNC: 3.5 G/DL (ref 2.8–4.4)
GLUCOSE BLD-MCNC: 109 MG/DL (ref 70–99)
HCT VFR BLD AUTO: 30.3 %
HGB BLD-MCNC: 9.2 G/DL
IMM GRANULOCYTES # BLD AUTO: 0.07 X10(3) UL (ref 0–1)
IMM GRANULOCYTES NFR BLD: 0.6 %
IRON SATURATION: 32 %
IRON SERPL-MCNC: 91 UG/DL
LYMPHOCYTES # BLD AUTO: 0.79 X10(3) UL (ref 1–4)
LYMPHOCYTES NFR BLD AUTO: 6.6 %
M PROTEIN MFR SERPL ELPH: 6.4 G/DL (ref 6.4–8.2)
MCH RBC QN AUTO: 30.9 PG (ref 26–34)
MCHC RBC AUTO-ENTMCNC: 30.4 G/DL (ref 31–37)
MCV RBC AUTO: 101.7 FL
MONOCYTES # BLD AUTO: 0.64 X10(3) UL (ref 0.1–1)
MONOCYTES NFR BLD AUTO: 5.4 %
NEUTROPHILS # BLD AUTO: 10.2 X10 (3) UL (ref 1.5–7.7)
NEUTROPHILS # BLD AUTO: 10.2 X10(3) UL (ref 1.5–7.7)
NEUTROPHILS NFR BLD AUTO: 85.7 %
OSMOLALITY SERPL CALC.SUM OF ELEC: 305 MOSM/KG (ref 275–295)
PLATELET # BLD AUTO: 172 10(3)UL (ref 150–450)
POTASSIUM SERPL-SCNC: 4.4 MMOL/L (ref 3.5–5.1)
RBC # BLD AUTO: 2.98 X10(6)UL
SODIUM SERPL-SCNC: 142 MMOL/L (ref 136–145)
TOTAL IRON BINDING CAPACITY: 282 UG/DL (ref 240–450)
TRANSFERRIN SERPL-MCNC: 189 MG/DL (ref 200–360)
WBC # BLD AUTO: 11.9 X10(3) UL (ref 4–11)

## 2021-07-14 PROCEDURE — 80053 COMPREHEN METABOLIC PANEL: CPT

## 2021-07-14 PROCEDURE — 96523 IRRIG DRUG DELIVERY DEVICE: CPT

## 2021-07-14 PROCEDURE — 85025 COMPLETE CBC W/AUTO DIFF WBC: CPT

## 2021-07-14 PROCEDURE — 36415 COLL VENOUS BLD VENIPUNCTURE: CPT

## 2021-07-14 PROCEDURE — 83540 ASSAY OF IRON: CPT

## 2021-07-14 PROCEDURE — 99213 OFFICE O/P EST LOW 20 MIN: CPT | Performed by: INTERNAL MEDICINE

## 2021-07-14 PROCEDURE — 84466 ASSAY OF TRANSFERRIN: CPT

## 2021-07-14 PROCEDURE — 82746 ASSAY OF FOLIC ACID SERUM: CPT

## 2021-07-14 RX ORDER — SODIUM CHLORIDE 9 MG/ML
INJECTION INTRAVENOUS
Status: DISPENSED
Start: 2021-07-14 | End: 2021-07-15

## 2021-07-14 RX ORDER — HYDROCHLOROTHIAZIDE 12.5 MG/1
TABLET ORAL
COMMUNITY
Start: 2021-04-23

## 2021-07-14 RX ORDER — SODIUM CHLORIDE 9 MG/ML
10 INJECTION INTRAVENOUS ONCE
OUTPATIENT
Start: 2021-07-14

## 2021-07-14 RX ORDER — HEPARIN SODIUM (PORCINE) LOCK FLUSH IV SOLN 100 UNIT/ML 100 UNIT/ML
5 SOLUTION INTRAVENOUS ONCE
OUTPATIENT
Start: 2021-07-14

## 2021-07-14 NOTE — PROGRESS NOTES
HPI    7/14/2021  Angela Hernandez is a 82year old female seen in follow-up today with her daughter. After our phone conversation. Her daughter had indicated that mom was pale and had lost weight.   Patient has been eating only small amounts unless fa LEFT MAMMOTOME BIOPSIES OF THE BREAST   -  DCIS solid type high nuclear grade     5/21/2003 Surgery    LEFT BREAST  -  wide excision microcalcifications     5/23/2003 Surgery    LEFT MASTECTOMY AND SENTINEL NODE  -  1.4 cm mod differentiated infiltrating d Outpatient Medications   Medication Sig Dispense Refill   • hydrochlorothiazide 12.5 MG Oral Tab      • Calcium Carbonate-Vitamin D (CALTRATE 600+D OR) Take by mouth.      • ferrous sulfate 325 (65 FE) MG Oral Tab EC Take 1 tablet (325 mg total) by mouth Se ([facility] 3/26/2012   • Osteopenia    • Pain    • [facility]   • Polymyalgia rheumatica ([facility]    • Portacath in place 2003    venous access   • S/P colonoscopy 2005    mild diverticulosis / Internal hemorrhoid   • Status post bilateral Expenses:   Food Insecurity:       Worried About [address] in the Last Year:       Ran Out of Food in the Last Year:   Transportation Needs:       Lack of Transportation (Medical):       Lack of Transportation (Non-Medical):   Physical Activity: missing lower teet, no mucositis   Eyes:      General: No scleral icterus. Conjunctiva/sclera: Conjunctivae normal.      Pupils: Pupils are equal, round, and reactive to light.    Neck:      Comments: kyphosis  Cardiovascular:      Rate and Rhythm: Norm proliferation proliferation, +FISH invasive ductal carcinoma of the left breast. She is status post bilateral mastectomy and does not have evidence of chest wall recurrence. Patient has had ongoing pain involving the left femur.   Patient's last CT of th 30.4 (L)  30.2 (L)   RDW-SD      35.1 - 46.3 fL 53.7 (H)  54.0 (H)   RDW      11.0 - 15.0 % 14.4  14.2   Platelet Count      514.6 - 450.0 10(3)uL 172.0  157.0   Prelim Neutrophil Abs      1.50 - 7.70 x10 (3) uL 10.20 (H)  6.55   Neutrophils Absolute Bilirubin Urine      Negative   Negative   Ketones, UA      Negative mg/dL   Negative   Blood Urine      Negative   Negative   PH Urine      5.0 - 8.0   5.0   Protein Urine      Negative mg/dL   Negative   Urobilinogen Urine      <2.0   <2.0   Nitrite Ur transversely oriented internal fixation screw. It is located just adjacent to the internal fixation plate. Correlate clinically as to its clinical importance.      Meds This Visit:  See med list above    Imaging & Referrals:  Patient declines

## 2021-07-15 PROBLEM — D53.9 MACROCYTIC ANEMIA: Status: ACTIVE | Noted: 2021-07-15

## 2021-07-15 PROBLEM — Z90.13 STATUS POST BILATERAL MASTECTOMY: Status: ACTIVE | Noted: 2021-07-15

## 2021-07-15 PROBLEM — N18.9 CKD (CHRONIC KIDNEY DISEASE): Status: ACTIVE | Noted: 2021-07-15

## 2021-07-15 LAB — FOLATE SERPL-MCNC: 16.5 NG/ML (ref 8.7–?)

## 2021-08-20 ENCOUNTER — APPOINTMENT (OUTPATIENT)
Dept: HEMATOLOGY/ONCOLOGY | Facility: HOSPITAL | Age: 86
End: 2021-08-20
Attending: INTERNAL MEDICINE
Payer: MEDICARE

## 2021-09-21 ENCOUNTER — LAB ENCOUNTER (OUTPATIENT)
Dept: LAB | Facility: HOSPITAL | Age: 86
End: 2021-09-21
Attending: INTERNAL MEDICINE
Payer: MEDICARE

## 2021-09-21 DIAGNOSIS — N39.0 URINARY TRACT INFECTION, SITE NOT SPECIFIED: ICD-10-CM

## 2021-09-21 DIAGNOSIS — D64.9 ANEMIA, UNSPECIFIED: Primary | ICD-10-CM

## 2021-09-21 DIAGNOSIS — N18.30 CHRONIC KIDNEY DISEASE, STAGE III (MODERATE) (HCC): ICD-10-CM

## 2021-09-21 DIAGNOSIS — E88.89 7,8-DIHYDROBIOPTERIN SYNTHETASE DEFICIENCY (HCC): ICD-10-CM

## 2021-09-21 LAB
ALBUMIN SERPL-MCNC: 3.1 G/DL (ref 3.4–5)
ALBUMIN/GLOB SERPL: 0.9 {RATIO} (ref 1–2)
ALP LIVER SERPL-CCNC: 57 U/L
ALT SERPL-CCNC: 20 U/L
ANION GAP SERPL CALC-SCNC: 6 MMOL/L (ref 0–18)
AST SERPL-CCNC: 17 U/L (ref 15–37)
BASOPHILS # BLD AUTO: 0.05 X10(3) UL (ref 0–0.2)
BASOPHILS NFR BLD AUTO: 0.5 %
BILIRUB SERPL-MCNC: 0.3 MG/DL (ref 0.1–2)
BILIRUB UR QL: NEGATIVE
BUN BLD-MCNC: 56 MG/DL (ref 7–18)
BUN/CREAT SERPL: 46.7 (ref 10–20)
CALCIUM BLD-MCNC: 8.9 MG/DL (ref 8.5–10.1)
CHLORIDE SERPL-SCNC: 111 MMOL/L (ref 98–112)
CHOLEST SERPL-MCNC: 216 MG/DL (ref ?–200)
CLARITY UR: CLEAR
CO2 SERPL-SCNC: 26 MMOL/L (ref 21–32)
COLOR UR: YELLOW
CREAT BLD-MCNC: 1.2 MG/DL
CREAT UR-SCNC: 66.5 MG/DL
DEPRECATED HBV CORE AB SER IA-ACNC: 492 NG/ML
DEPRECATED RDW RBC AUTO: 50.8 FL (ref 35.1–46.3)
EOSINOPHIL # BLD AUTO: 0.24 X10(3) UL (ref 0–0.7)
EOSINOPHIL NFR BLD AUTO: 2.4 %
ERYTHROCYTE [DISTWIDTH] IN BLOOD BY AUTOMATED COUNT: 13.7 % (ref 11–15)
GLOBULIN PLAS-MCNC: 3.5 G/DL (ref 2.8–4.4)
GLUCOSE BLD-MCNC: 83 MG/DL (ref 70–99)
GLUCOSE UR-MCNC: NEGATIVE MG/DL
HCT VFR BLD AUTO: 34 %
HDLC SERPL-MCNC: 74 MG/DL (ref 40–59)
HGB BLD-MCNC: 10.5 G/DL
HGB UR QL STRIP.AUTO: NEGATIVE
IMM GRANULOCYTES # BLD AUTO: 0.06 X10(3) UL (ref 0–1)
IMM GRANULOCYTES NFR BLD: 0.6 %
IRON SATURATION: 27 %
IRON SERPL-MCNC: 76 UG/DL
KETONES UR-MCNC: NEGATIVE MG/DL
LDLC SERPL CALC-MCNC: 116 MG/DL (ref ?–100)
LYMPHOCYTES # BLD AUTO: 1.56 X10(3) UL (ref 1–4)
LYMPHOCYTES NFR BLD AUTO: 15.3 %
MCH RBC QN AUTO: 31.3 PG (ref 26–34)
MCHC RBC AUTO-ENTMCNC: 30.9 G/DL (ref 31–37)
MCV RBC AUTO: 101.2 FL
MICROALBUMIN UR-MCNC: 3.33 MG/DL
MICROALBUMIN/CREAT 24H UR-RTO: 50.1 UG/MG (ref ?–30)
MONOCYTES # BLD AUTO: 0.73 X10(3) UL (ref 0.1–1)
MONOCYTES NFR BLD AUTO: 7.2 %
NEUTROPHILS # BLD AUTO: 7.54 X10 (3) UL (ref 1.5–7.7)
NEUTROPHILS # BLD AUTO: 7.54 X10(3) UL (ref 1.5–7.7)
NEUTROPHILS NFR BLD AUTO: 74 %
NITRITE UR QL STRIP.AUTO: NEGATIVE
NONHDLC SERPL-MCNC: 142 MG/DL (ref ?–130)
OSMOLALITY SERPL CALC.SUM OF ELEC: 311 MOSM/KG (ref 275–295)
PATIENT FASTING Y/N/NP: YES
PATIENT FASTING Y/N/NP: YES
PH UR: 6 [PH] (ref 5–8)
PLATELET # BLD AUTO: 154 10(3)UL (ref 150–450)
POTASSIUM SERPL-SCNC: 3.8 MMOL/L (ref 3.5–5.1)
PROT SERPL-MCNC: 6.6 G/DL (ref 6.4–8.2)
PROT UR-MCNC: NEGATIVE MG/DL
RBC # BLD AUTO: 3.36 X10(6)UL
SODIUM SERPL-SCNC: 143 MMOL/L (ref 136–145)
SP GR UR STRIP: 1.02 (ref 1–1.03)
TOTAL IRON BINDING CAPACITY: 277 UG/DL (ref 240–450)
TRANSFERRIN SERPL-MCNC: 186 MG/DL (ref 200–360)
TRIGL SERPL-MCNC: 152 MG/DL (ref 30–149)
TSI SER-ACNC: 0.76 MIU/ML (ref 0.36–3.74)
UROBILINOGEN UR STRIP-ACNC: <2
VLDLC SERPL CALC-MCNC: 26 MG/DL (ref 0–30)
WBC # BLD AUTO: 10.2 X10(3) UL (ref 4–11)

## 2021-09-21 PROCEDURE — 82728 ASSAY OF FERRITIN: CPT

## 2021-09-21 PROCEDURE — 80053 COMPREHEN METABOLIC PANEL: CPT

## 2021-09-21 PROCEDURE — 83540 ASSAY OF IRON: CPT

## 2021-09-21 PROCEDURE — 84466 ASSAY OF TRANSFERRIN: CPT

## 2021-09-21 PROCEDURE — 80061 LIPID PANEL: CPT

## 2021-09-21 PROCEDURE — 85025 COMPLETE CBC W/AUTO DIFF WBC: CPT

## 2021-09-21 PROCEDURE — 82043 UR ALBUMIN QUANTITATIVE: CPT

## 2021-09-21 PROCEDURE — 81001 URINALYSIS AUTO W/SCOPE: CPT

## 2021-09-21 PROCEDURE — 82570 ASSAY OF URINE CREATININE: CPT

## 2021-09-21 PROCEDURE — 36415 COLL VENOUS BLD VENIPUNCTURE: CPT

## 2021-09-21 PROCEDURE — 84443 ASSAY THYROID STIM HORMONE: CPT

## 2021-12-27 ENCOUNTER — IMMUNIZATION (OUTPATIENT)
Dept: LAB | Facility: HOSPITAL | Age: 86
End: 2021-12-27
Attending: EMERGENCY MEDICINE
Payer: MEDICARE

## 2021-12-27 DIAGNOSIS — Z23 NEED FOR VACCINATION: Primary | ICD-10-CM

## 2021-12-27 PROCEDURE — 0064A SARSCOV2 VAC 50MCG/0.25ML IM: CPT

## 2022-03-23 ENCOUNTER — TELEPHONE (OUTPATIENT)
Dept: HEMATOLOGY/ONCOLOGY | Facility: HOSPITAL | Age: 87
End: 2022-03-23

## 2022-03-23 NOTE — TELEPHONE ENCOUNTER
----- Message from Joy Rojo RN sent at 3/22/2022  3:32 PM CDT -----  Patient was due for follow up in January - Breast- call and schedule with another provider.       Sandeep Prieto

## 2022-04-08 ENCOUNTER — LAB ENCOUNTER (OUTPATIENT)
Dept: LAB | Facility: HOSPITAL | Age: 87
End: 2022-04-08
Attending: INTERNAL MEDICINE
Payer: MEDICARE

## 2022-04-08 DIAGNOSIS — E78.00 PURE HYPERCHOLESTEROLEMIA: ICD-10-CM

## 2022-04-08 DIAGNOSIS — R35.89 POLYURIA: ICD-10-CM

## 2022-04-08 DIAGNOSIS — N18.31 CHRONIC KIDNEY DISEASE, STAGE 3A (HCC): ICD-10-CM

## 2022-04-08 DIAGNOSIS — D64.9 ANEMIA: Primary | ICD-10-CM

## 2022-04-08 LAB
ALBUMIN SERPL-MCNC: 2.9 G/DL (ref 3.4–5)
ALBUMIN/GLOB SERPL: 0.9 {RATIO} (ref 1–2)
ALP LIVER SERPL-CCNC: 69 U/L
ALT SERPL-CCNC: 19 U/L
ANION GAP SERPL CALC-SCNC: 7 MMOL/L (ref 0–18)
AST SERPL-CCNC: 15 U/L (ref 15–37)
BASOPHILS # BLD AUTO: 0.04 X10(3) UL (ref 0–0.2)
BASOPHILS NFR BLD AUTO: 0.4 %
BILIRUB SERPL-MCNC: 0.4 MG/DL (ref 0.1–2)
BUN BLD-MCNC: 46 MG/DL (ref 7–18)
BUN/CREAT SERPL: 41.4 (ref 10–20)
CALCIUM BLD-MCNC: 9 MG/DL (ref 8.5–10.1)
CHLORIDE SERPL-SCNC: 110 MMOL/L (ref 98–112)
CHOLEST SERPL-MCNC: 191 MG/DL (ref ?–200)
CO2 SERPL-SCNC: 29 MMOL/L (ref 21–32)
CREAT BLD-MCNC: 1.11 MG/DL
DEPRECATED HBV CORE AB SER IA-ACNC: 584.8 NG/ML
DEPRECATED RDW RBC AUTO: 53.3 FL (ref 35.1–46.3)
EOSINOPHIL # BLD AUTO: 0.43 X10(3) UL (ref 0–0.7)
EOSINOPHIL NFR BLD AUTO: 4.8 %
ERYTHROCYTE [DISTWIDTH] IN BLOOD BY AUTOMATED COUNT: 14.5 % (ref 11–15)
FASTING PATIENT LIPID ANSWER: YES
FASTING STATUS PATIENT QL REPORTED: YES
GLOBULIN PLAS-MCNC: 3.1 G/DL (ref 2.8–4.4)
GLUCOSE BLD-MCNC: 103 MG/DL (ref 70–99)
HCT VFR BLD AUTO: 31.6 %
HDLC SERPL-MCNC: 64 MG/DL (ref 40–59)
HGB BLD-MCNC: 9.8 G/DL
IMM GRANULOCYTES # BLD AUTO: 0.06 X10(3) UL (ref 0–1)
IMM GRANULOCYTES NFR BLD: 0.7 %
IRON SATN MFR SERPL: 27 %
IRON SERPL-MCNC: 66 UG/DL
LDLC SERPL CALC-MCNC: 109 MG/DL (ref ?–100)
LYMPHOCYTES # BLD AUTO: 0.92 X10(3) UL (ref 1–4)
LYMPHOCYTES NFR BLD AUTO: 10.2 %
MCH RBC QN AUTO: 31.4 PG (ref 26–34)
MCHC RBC AUTO-ENTMCNC: 31 G/DL (ref 31–37)
MCV RBC AUTO: 101.3 FL
MONOCYTES # BLD AUTO: 0.71 X10(3) UL (ref 0.1–1)
MONOCYTES NFR BLD AUTO: 7.9 %
NEUTROPHILS # BLD AUTO: 6.86 X10 (3) UL (ref 1.5–7.7)
NEUTROPHILS # BLD AUTO: 6.86 X10(3) UL (ref 1.5–7.7)
NEUTROPHILS NFR BLD AUTO: 76 %
NONHDLC SERPL-MCNC: 127 MG/DL (ref ?–130)
OSMOLALITY SERPL CALC.SUM OF ELEC: 314 MOSM/KG (ref 275–295)
PLATELET # BLD AUTO: 143 10(3)UL (ref 150–450)
POTASSIUM SERPL-SCNC: 3.7 MMOL/L (ref 3.5–5.1)
PROT SERPL-MCNC: 6 G/DL (ref 6.4–8.2)
RBC # BLD AUTO: 3.12 X10(6)UL
SODIUM SERPL-SCNC: 146 MMOL/L (ref 136–145)
TIBC SERPL-MCNC: 246 UG/DL (ref 240–450)
TRANSFERRIN SERPL-MCNC: 165 MG/DL (ref 200–360)
TRIGL SERPL-MCNC: 98 MG/DL (ref 30–149)
TSI SER-ACNC: 2.25 MIU/ML (ref 0.36–3.74)
VLDLC SERPL CALC-MCNC: 17 MG/DL (ref 0–30)
WBC # BLD AUTO: 9 X10(3) UL (ref 4–11)

## 2022-04-08 PROCEDURE — 85025 COMPLETE CBC W/AUTO DIFF WBC: CPT

## 2022-04-08 PROCEDURE — 80053 COMPREHEN METABOLIC PANEL: CPT

## 2022-04-08 PROCEDURE — 83540 ASSAY OF IRON: CPT

## 2022-04-08 PROCEDURE — 80061 LIPID PANEL: CPT

## 2022-04-08 PROCEDURE — 82728 ASSAY OF FERRITIN: CPT

## 2022-04-08 PROCEDURE — 84443 ASSAY THYROID STIM HORMONE: CPT

## 2022-04-08 PROCEDURE — 36415 COLL VENOUS BLD VENIPUNCTURE: CPT

## 2022-04-08 PROCEDURE — 84466 ASSAY OF TRANSFERRIN: CPT

## 2022-04-10 ENCOUNTER — HOSPITAL ENCOUNTER (INPATIENT)
Facility: HOSPITAL | Age: 87
LOS: 4 days | Discharge: NURSING, REHAB OR LONG-TERM CARE | End: 2022-04-14
Attending: EMERGENCY MEDICINE | Admitting: INTERNAL MEDICINE
Payer: MEDICARE

## 2022-04-10 ENCOUNTER — APPOINTMENT (OUTPATIENT)
Dept: CT IMAGING | Facility: HOSPITAL | Age: 87
End: 2022-04-10
Attending: EMERGENCY MEDICINE
Payer: MEDICARE

## 2022-04-10 ENCOUNTER — APPOINTMENT (OUTPATIENT)
Dept: GENERAL RADIOLOGY | Facility: HOSPITAL | Age: 87
End: 2022-04-10
Attending: EMERGENCY MEDICINE
Payer: MEDICARE

## 2022-04-10 DIAGNOSIS — R53.83 FATIGUE, UNSPECIFIED TYPE: Primary | ICD-10-CM

## 2022-04-10 LAB
ALBUMIN SERPL-MCNC: 2.8 G/DL (ref 3.4–5)
ALP LIVER SERPL-CCNC: 67 U/L
ALT SERPL-CCNC: 22 U/L
ANION GAP SERPL CALC-SCNC: 2 MMOL/L (ref 0–18)
AST SERPL-CCNC: 21 U/L (ref 15–37)
BASOPHILS # BLD AUTO: 0.05 X10(3) UL (ref 0–0.2)
BASOPHILS NFR BLD AUTO: 0.5 %
BILIRUB DIRECT SERPL-MCNC: 0.1 MG/DL (ref 0–0.2)
BILIRUB SERPL-MCNC: 0.3 MG/DL (ref 0.1–2)
BILIRUB UR QL: NEGATIVE
BUN BLD-MCNC: 48 MG/DL (ref 7–18)
BUN/CREAT SERPL: 34 (ref 10–20)
CALCIUM BLD-MCNC: 8.5 MG/DL (ref 8.5–10.1)
CHLORIDE SERPL-SCNC: 111 MMOL/L (ref 98–112)
CK SERPL-CCNC: 43 U/L
CLARITY UR: CLEAR
CO2 SERPL-SCNC: 29 MMOL/L (ref 21–32)
COLOR UR: YELLOW
CREAT BLD-MCNC: 1.41 MG/DL
DEPRECATED RDW RBC AUTO: 54 FL (ref 35.1–46.3)
EOSINOPHIL # BLD AUTO: 0.43 X10(3) UL (ref 0–0.7)
EOSINOPHIL NFR BLD AUTO: 4.3 %
ERYTHROCYTE [DISTWIDTH] IN BLOOD BY AUTOMATED COUNT: 14.7 % (ref 11–15)
ERYTHROCYTE [SEDIMENTATION RATE] IN BLOOD: 40 MM/HR
GLUCOSE BLD-MCNC: 108 MG/DL (ref 70–99)
GLUCOSE UR-MCNC: NEGATIVE MG/DL
HCT VFR BLD AUTO: 31.6 %
HGB BLD-MCNC: 9.8 G/DL
HGB UR QL STRIP.AUTO: NEGATIVE
IMM GRANULOCYTES # BLD AUTO: 0.04 X10(3) UL (ref 0–1)
IMM GRANULOCYTES NFR BLD: 0.4 %
KETONES UR-MCNC: NEGATIVE MG/DL
LEUKOCYTE ESTERASE UR QL STRIP.AUTO: NEGATIVE
LIPASE SERPL-CCNC: 177 U/L (ref 73–393)
LYMPHOCYTES # BLD AUTO: 1.24 X10(3) UL (ref 1–4)
LYMPHOCYTES NFR BLD AUTO: 12.4 %
MCH RBC QN AUTO: 31 PG (ref 26–34)
MCHC RBC AUTO-ENTMCNC: 31 G/DL (ref 31–37)
MCV RBC AUTO: 100 FL
MONOCYTES # BLD AUTO: 0.82 X10(3) UL (ref 0.1–1)
MONOCYTES NFR BLD AUTO: 8.2 %
NEUTROPHILS # BLD AUTO: 7.41 X10 (3) UL (ref 1.5–7.7)
NEUTROPHILS # BLD AUTO: 7.41 X10(3) UL (ref 1.5–7.7)
NEUTROPHILS NFR BLD AUTO: 74.2 %
NITRITE UR QL STRIP.AUTO: NEGATIVE
NT-PROBNP SERPL-MCNC: 567 PG/ML (ref ?–450)
OSMOLALITY SERPL CALC.SUM OF ELEC: 307 MOSM/KG (ref 275–295)
PH UR: 6 [PH] (ref 5–8)
PLATELET # BLD AUTO: 152 10(3)UL (ref 150–450)
POTASSIUM SERPL-SCNC: 4 MMOL/L (ref 3.5–5.1)
PROT SERPL-MCNC: 6.2 G/DL (ref 6.4–8.2)
PROT UR-MCNC: NEGATIVE MG/DL
RBC # BLD AUTO: 3.16 X10(6)UL
SARS-COV-2 RNA RESP QL NAA+PROBE: NOT DETECTED
SODIUM SERPL-SCNC: 142 MMOL/L (ref 136–145)
SP GR UR STRIP: 1.01 (ref 1–1.03)
TROPONIN I HIGH SENSITIVITY: 12 NG/L
UROBILINOGEN UR STRIP-ACNC: <2
VIT C UR-MCNC: NEGATIVE MG/DL
WBC # BLD AUTO: 10 X10(3) UL (ref 4–11)

## 2022-04-10 PROCEDURE — 70450 CT HEAD/BRAIN W/O DYE: CPT | Performed by: EMERGENCY MEDICINE

## 2022-04-10 PROCEDURE — 71045 X-RAY EXAM CHEST 1 VIEW: CPT | Performed by: EMERGENCY MEDICINE

## 2022-04-10 RX ORDER — ACETAMINOPHEN 325 MG/1
650 TABLET ORAL EVERY 8 HOURS PRN
Status: DISCONTINUED | OUTPATIENT
Start: 2022-04-10 | End: 2022-04-14

## 2022-04-10 RX ORDER — PREDNISONE 10 MG/1
10 TABLET ORAL ONCE
Status: COMPLETED | OUTPATIENT
Start: 2022-04-10 | End: 2022-04-10

## 2022-04-10 RX ORDER — FAMOTIDINE 20 MG/1
20 TABLET, FILM COATED ORAL DAILY
Status: DISCONTINUED | OUTPATIENT
Start: 2022-04-11 | End: 2022-04-14

## 2022-04-10 RX ORDER — CLOTRIMAZOLE 1 %
CREAM (GRAM) TOPICAL 2 TIMES DAILY
Status: DISCONTINUED | OUTPATIENT
Start: 2022-04-10 | End: 2022-04-14

## 2022-04-10 RX ORDER — ESCITALOPRAM OXALATE 5 MG/1
5 TABLET ORAL DAILY
Refills: 5 | Status: DISCONTINUED | OUTPATIENT
Start: 2022-04-11 | End: 2022-04-14

## 2022-04-10 RX ORDER — LISINOPRIL 10 MG/1
10 TABLET ORAL DAILY
Status: DISCONTINUED | OUTPATIENT
Start: 2022-04-11 | End: 2022-04-14

## 2022-04-10 RX ORDER — MELATONIN
325 WEEKLY
Status: DISCONTINUED | OUTPATIENT
Start: 2022-04-17 | End: 2022-04-14

## 2022-04-10 RX ORDER — PREDNISONE 10 MG/1
5 TABLET ORAL DAILY
Status: DISCONTINUED | OUTPATIENT
Start: 2022-04-11 | End: 2022-04-14

## 2022-04-10 RX ORDER — ASPIRIN 81 MG/1
81 TABLET ORAL DAILY
Status: DISCONTINUED | OUTPATIENT
Start: 2022-04-11 | End: 2022-04-14

## 2022-04-10 RX ORDER — CALCIUM CARBONATE/VITAMIN D3 250-3.125
1 TABLET ORAL
Status: DISCONTINUED | OUTPATIENT
Start: 2022-04-11 | End: 2022-04-14

## 2022-04-10 RX ORDER — ASPIRIN 81 MG/1
324 TABLET, CHEWABLE ORAL ONCE
Status: COMPLETED | OUTPATIENT
Start: 2022-04-10 | End: 2022-04-10

## 2022-04-10 RX ORDER — FUROSEMIDE 10 MG/ML
10 INJECTION INTRAMUSCULAR; INTRAVENOUS ONCE
Status: COMPLETED | OUTPATIENT
Start: 2022-04-10 | End: 2022-04-10

## 2022-04-10 NOTE — CONSULTS
Cardiology (consult dictated)    Assessment:  89-year-old female presenting to the hospital with complaints of orthostatic dizziness as well as generalized weakness. She also complains of pain in the lower sternal area occurring 3-4 times a week each lasting a few minutes. No dyspnea. She has no prior defined cardiac history. She had a normal echo and nuclear stress test in 2017.    2.  History of breast cancer, status post bilateral mastectomy and subsequent chemotherapy circa 2003    3. History of chronic kidney disease    4 hypertension    5 peptic ulcer disease      Plan:  1. Check orthostatic blood pressures. 2.  Echocardiogram.    3.  Regadenoson nuclear stress test.    4.  Would not aggressively diurese    5. Further recommendations pending results of the above and clinical course.     Thank you

## 2022-04-10 NOTE — ED QUICK NOTES
Patient up with RN and tech for ambulation with  Mami Carcamo, patient struggling to ambulate due to weakness. MD aware, orders placed for admission.

## 2022-04-10 NOTE — CONSULTS
[facility]    PATIENT'S NAME: Shereen SOLOMON   ATTENDING PHYSICIAN: Cecilio Brown MD   CONSULTING PHYSICIAN: Miroslava Rice. Chucky Maciel MD   PATIENT ACCOUNT#:   [id number]    LOCATION:  [address] [place] [address] RECORD #:   [id number]       YOB: 1935  ADMISSION DATE:       04/10/2022      CONSULT DATE:  04/10/2022    REPORT OF CONSULTATION      HISTORY OF PRESENT ILLNESS:  The patient is a pleasant 82-year-old female who comes to the hospital for admission due to generalized weakness and occasional lightheadedness. She walks with a walker at home and has had difficulty in maintaining normal activities due to profound weakness. In addition, she has lightheadedness when she stands up. She has had pain in the lower sternal/upper abdominal area occurring 3 to 4 times per week, each time lasting a few minutes over the past week. She does not have a history of ischemic heart disease in the past.  Echo and nuclear stress test from 2017 were normal.  She has a systolic murmur today suggesting probably mild aortic stenosis. PAST MEDICAL HISTORY:  Other past medical history of breast cancer status post bilateral mastectomy as well as chemotherapy, hypertension, history of left hip fracture and pinning of the femur, dyslipidemia, anemia, polymyalgia rheumatica. ALLERGIES:  No known drug allergies. SOCIAL HISTORY:  Nonsmoker, nondrinker. Daughter is with her today. PHYSICAL EXAMINATION:    GENERAL:  Well-developed, well-nourished female in no acute distress, alert and oriented x3. VITAL SIGNS:  Blood pressure 140/58, respirations 18, pulse 64, afebrile, saturating 100% on room air. NECK:  Neck veins are mildly distended. Carotids are brisk without bruits. No thyromegaly. LUNGS:  Grossly clear. HEART:  S1, S2 normal.  Grade 2/6 early to mid systolic ejection murmur. EXTREMITIES:  There is 1+ edema of the right leg and 2+ on the left. No cyanosis or clubbing.   Good distal perfusion. LABORATORY DATA:  Sodium 142, potassium 4.0, bicarbonate 29, BUN 48, creatinine 1.41. ProBNP 567. Troponin normal.  Albumin 2.8. WBC 10.0, hemoglobin 9.8, platelets 335. Chest x-ray shows poor inspiration, normal heart size, clear lung fields. EKG shows sinus rhythm with voltage for LVH, no acute changes. ASSESSMENT:    1. Generalized weakness. No clear-cut definitive explanation at this time. The patient has a degree of chronic kidney disease, she is anemic. Rule out cardiomyopathy or other cardiovascular cause for weakness. 2.   Chest pain, rule out angina. No doubt. PLAN:    1. Check orthostatic blood pressures. 2.   Echocardiogram.  3.   Regadenoson nuclear stress test to evaluate the chest pain. 4.   Further recommendations pending clinical course. Thank you for this consultation. Dictated By Truman Body.  Alonzo Cordova MD  d: 04/10/2022 15:52:58  t: 04/10/2022 16:01:33  Job [id number]  [facility]/

## 2022-04-10 NOTE — ED QUICK NOTES
Orders for admission, patient is aware of plan and ready to go upstairs. Any questions, please call ED RN Evelyn Givens at extension [phone]. Patient Covid vaccination status: Fully vaccinated and immunocompromised     COVID Test Ordered in ED: Rapid SARS-CoV-2 by PCR    COVID Suspicion at Admission: Low clinical suspicion for COVID    Running Infusions:      Mental Status/LOC at time of transport:  Aox4    Other pertinent information: patient has hearing aids, very [place], able to ambulate with walker and assistance d/t weakness  CIWA score: N/A   NIH score:  N/A

## 2022-04-10 NOTE — ED QUICK NOTES
Transport at bedside. Patient aware of plan of care, verbalizes understanding. Patient clean and dry, suction tubing for purewick removed prior to transport  Brought to floor with belongings.

## 2022-04-11 ENCOUNTER — APPOINTMENT (OUTPATIENT)
Dept: MRI IMAGING | Facility: HOSPITAL | Age: 87
End: 2022-04-11
Attending: INTERNAL MEDICINE
Payer: MEDICARE

## 2022-04-11 ENCOUNTER — APPOINTMENT (OUTPATIENT)
Dept: CV DIAGNOSTICS | Facility: HOSPITAL | Age: 87
End: 2022-04-11
Attending: INTERNAL MEDICINE
Payer: MEDICARE

## 2022-04-11 ENCOUNTER — APPOINTMENT (OUTPATIENT)
Dept: NUCLEAR MEDICINE | Facility: HOSPITAL | Age: 87
End: 2022-04-11
Attending: INTERNAL MEDICINE
Payer: MEDICARE

## 2022-04-11 PROCEDURE — 93017 CV STRESS TEST TRACING ONLY: CPT | Performed by: INTERNAL MEDICINE

## 2022-04-11 PROCEDURE — 78452 HT MUSCLE IMAGE SPECT MULT: CPT | Performed by: INTERNAL MEDICINE

## 2022-04-11 PROCEDURE — 93306 TTE W/DOPPLER COMPLETE: CPT | Performed by: INTERNAL MEDICINE

## 2022-04-11 PROCEDURE — 70551 MRI BRAIN STEM W/O DYE: CPT | Performed by: INTERNAL MEDICINE

## 2022-04-11 NOTE — OCCUPATIONAL THERAPY NOTE
Orders received, chart reviewed for occupational therapy evaluation. Pt is currently off unit for stress test. Spoke with YAMILETH Ocasio who confirmed this --pt with additional tests this AM as well. Will follow-up this PM. RN aware and agreeable.

## 2022-04-11 NOTE — PLAN OF CARE
No acute changes. Stress test, MRI, and echo done today. Family at bedside updated on plan of care. Up with PT to the bathroom, pt tachy with ambulation and MD aware. All safety measures in place. MRI of cervical spine to be done-- per MRI tech, screen form does not need to be done unless MRI is not done in next 24 hours. Discharge pending rehab placement. Problem: Patient Centered Care  Goal: Patient preferences are identified and integrated in the patient's plan of care  Description: Interventions:  - What would you like us to know as we care for you? I prefer my family be here when the doctors come by  - Provide timely, complete, and accurate information to patient/family  - Incorporate patient and family knowledge, values, beliefs, and cultural backgrounds into the planning and delivery of care  - Encourage patient/family to participate in care and decision-making at the level they choose  - Honor patient and family perspectives and choices  Outcome: Progressing      Problem: CARDIOVASCULAR - ADULT  Goal: Maintains optimal cardiac output and hemodynamic stability  Description: INTERVENTIONS:  - Monitor vital signs, rhythm, and trends  - Monitor for bleeding, hypotension and signs of decreased cardiac output  - Evaluate effectiveness of vasoactive medications to optimize hemodynamic stability  - Monitor arterial and/or venous puncture sites for bleeding and/or hematoma  - Assess quality of pulses, skin color and temperature  - Assess for signs of decreased coronary artery perfusion - ex.  Angina  - Evaluate fluid balance, assess for edema, trend weights  Outcome: Progressing  Goal: Absence of cardiac arrhythmias or at baseline  Description: INTERVENTIONS:  - Continuous cardiac monitoring, monitor vital signs, obtain 12 lead EKG if indicated  - Evaluate effectiveness of antiarrhythmic and heart rate control medications as ordered  - Initiate emergency measures for life threatening arrhythmias  - Monitor electrolytes and administer replacement therapy as ordered  Outcome: Progressing     Problem: MUSCULOSKELETAL - ADULT  Goal: Return mobility to safest level of function  Description: INTERVENTIONS:  - Assess patient stability and activity tolerance for standing, transferring and ambulating w/ or w/o assistive devices  - Assist with transfers and ambulation using safe patient handling equipment as needed  - Ensure adequate protection for wounds/incisions during mobilization  - Obtain PT/OT consults as needed  - Advance activity as appropriate  - Communicate ordered activity level and limitations with patient/family  Outcome: Progressing     Problem: NEUROLOGICAL - ADULT  Goal: Achieves stable or improved neurological status  Description: INTERVENTIONS  - Assess for and report changes in neurological status  - Initiate measures to prevent increased intracranial pressure  - Maintain blood pressure and fluid volume within ordered parameters to optimize cerebral perfusion and minimize risk of hemorrhage  - Monitor temperature, glucose, and sodium.  Initiate appropriate interventions as ordered  Outcome: Progressing  Goal: Achieves maximal functionality and self care  Description: INTERVENTIONS  - Monitor swallowing and airway patency with patient fatigue and changes in neurological status  - Encourage and assist patient to increase activity and self care with guidance from PT/OT  - Encourage visually impaired, hearing impaired and aphasic patients to use assistive/communication devices  Outcome: Progressing     Problem: Impaired Functional Mobility  Goal: Achieve highest/safest level of mobility/gait  Description: Interventions:  - Assess patient's functional ability and stability  - Promote increasing activity/tolerance for mobility and gait  - Educate and engage patient/family in tolerated activity level and precautions    Outcome: Progressing

## 2022-04-11 NOTE — CM/SW NOTE
Received MDO that family is requesting NE placement for patient. Referrals pending via [employer] for NE placement, need to provide choice list when avail. PASRR requested through [employer], attached to referral.     100pm  NE list available. SW called and LVM for daughter Donny Weldon to notify, requesting a call back to discuss. 440pm  Received call back from daughter, Donny Weldon. Requested list to be emailed to her at [email or web address] and a copy to be left in patient room. Copy of list emailed to daughter and a copy has been left in patient room upon request.     SW to follow case. Please notify as needs arise.        AGA Wilkerson, [facility]    [id number]

## 2022-04-11 NOTE — PHYSICAL THERAPY NOTE
Orders received, chart reviewed for physical therapy evaluation. Pt is currently off unit for stress test. OT assigned spoke with YAMILETH Ness who confirmed this --pt with additional tests this AM as well. Will follow-up this PM as appropriate. RN aware and agreeable.      Thank you,    Ming Rollins, PT  4/11/2022

## 2022-04-11 NOTE — PLAN OF CARE
Problem: Patient Centered Care  Goal: Patient preferences are identified and integrated in the patient's plan of care  Description: Interventions:  - What would you like us to know as we care for you? Patient is from home alone  - Provide timely, complete, and accurate information to patient/family  - Incorporate patient and family knowledge, values, beliefs, and cultural backgrounds into the planning and delivery of care  - Encourage patient/family to participate in care and decision-making at the level they choose  - Honor patient and family perspectives and choices  Outcome: Progressing     Problem: Patient/Family Goals  Goal: Patient/Family Long Term Goal  Description: Patient's Long Term Goal: To go home    Interventions:  - See additional Care Plan goals for specific interventions  Outcome: Progressing  Goal: Patient/Family Short Term Goal  Description: Patient's Short Term Goal: To feel better    Interventions:   - See additional Care Plan goals for specific interventions  Outcome: Progressing     Problem: CARDIOVASCULAR - ADULT  Goal: Maintains optimal cardiac output and hemodynamic stability  Description: INTERVENTIONS:  - Monitor vital signs, rhythm, and trends  - Monitor for bleeding, hypotension and signs of decreased cardiac output  - Evaluate effectiveness of vasoactive medications to optimize hemodynamic stability  - Monitor arterial and/or venous puncture sites for bleeding and/or hematoma  - Assess quality of pulses, skin color and temperature  - Assess for signs of decreased coronary artery perfusion - ex.  Angina  - Evaluate fluid balance, assess for edema, trend weights  Outcome: Progressing  Goal: Absence of cardiac arrhythmias or at baseline  Description: INTERVENTIONS:  - Continuous cardiac monitoring, monitor vital signs, obtain 12 lead EKG if indicated  - Evaluate effectiveness of antiarrhythmic and heart rate control medications as ordered  - Initiate emergency measures for life threatening arrhythmias  - Monitor electrolytes and administer replacement therapy as ordered  Outcome: Progressing     Problem: MUSCULOSKELETAL - ADULT  Goal: Return mobility to safest level of function  Description: INTERVENTIONS:  - Assess patient stability and activity tolerance for standing, transferring and ambulating w/ or w/o assistive devices  - Assist with transfers and ambulation using safe patient handling equipment as needed  - Ensure adequate protection for wounds/incisions during mobilization  - Obtain PT/OT consults as needed  - Advance activity as appropriate  - Communicate ordered activity level and limitations with patient/family  Outcome: Progressing     Problem: NEUROLOGICAL - ADULT  Goal: Achieves stable or improved neurological status  Description: INTERVENTIONS  - Assess for and report changes in neurological status  - Initiate measures to prevent increased intracranial pressure  - Maintain blood pressure and fluid volume within ordered parameters to optimize cerebral perfusion and minimize risk of hemorrhage  - Monitor temperature, glucose, and sodium.  Initiate appropriate interventions as ordered  Outcome: Not Progressing  Goal: Achieves maximal functionality and self care  Description: INTERVENTIONS  - Monitor swallowing and airway patency with patient fatigue and changes in neurological status  - Encourage and assist patient to increase activity and self care with guidance from PT/OT  - Encourage visually impaired, hearing impaired and aphasic patients to use assistive/communication devices  Outcome: Not Progressing     Problem: Impaired Functional Mobility  Goal: Achieve highest/safest level of mobility/gait  Description: Interventions:  - Assess patient's functional ability and stability  - Promote increasing activity/tolerance for mobility and gait  - Educate and engage patient/family in tolerated activity level and precautions    Outcome: Not Progressing   Patient came to ED for weakness/dizziness. CT of brain showed potential lesion. MRI scheduled for today. Stress test and 2Decho today. VSS. Safety precautions in place. Bed in lowest locked position with side rails up and call light within reach. Will continue to monitor patient.

## 2022-04-12 PROCEDURE — 99222 1ST HOSP IP/OBS MODERATE 55: CPT | Performed by: PHYSICAL MEDICINE & REHABILITATION

## 2022-04-12 RX ORDER — CLOTRIMAZOLE 1 %
1 CREAM (GRAM) TOPICAL 2 TIMES DAILY
Qty: 1 EACH | Refills: 2 | Status: SHIPPED | OUTPATIENT
Start: 2022-04-12 | End: 2022-04-13

## 2022-04-12 RX ORDER — FAMOTIDINE 20 MG/1
10 TABLET, FILM COATED ORAL DAILY
Qty: 30 TABLET | Refills: 0 | Status: SHIPPED | OUTPATIENT
Start: 2022-04-13 | End: 2022-04-13

## 2022-04-12 NOTE — PLAN OF CARE
Vitals within normal limits, room air, ambulating with one person assist and walker. Remote tele in place with no calls. L-arm precautions. MRI - pending. Ray Ventura MD when MRI is completed. Purewick off throughout the day. +BM. Incontinent x1. Call light within reach. Fall precautions in place. Will continue to monitor. Problem: Patient Centered Care  Goal: Patient preferences are identified and integrated in the patient's plan of care  Description: Interventions:  - Provide timely, complete, and accurate information to patient/family  - Incorporate patient and family knowledge, values, beliefs, and cultural backgrounds into the planning and delivery of care  - Encourage patient/family to participate in care and decision-making at the level they choose  - Honor patient and family perspectives and choices  Outcome: Progressing     Problem: CARDIOVASCULAR - ADULT  Goal: Maintains optimal cardiac output and hemodynamic stability  Description: INTERVENTIONS:  - Monitor vital signs, rhythm, and trends  - Monitor for bleeding, hypotension and signs of decreased cardiac output  - Evaluate effectiveness of vasoactive medications to optimize hemodynamic stability  - Monitor arterial and/or venous puncture sites for bleeding and/or hematoma  - Assess quality of pulses, skin color and temperature  - Assess for signs of decreased coronary artery perfusion - ex.  Angina  - Evaluate fluid balance, assess for edema, trend weights  Outcome: Progressing  Goal: Absence of cardiac arrhythmias or at baseline  Description: INTERVENTIONS:  - Continuous cardiac monitoring, monitor vital signs, obtain 12 lead EKG if indicated  - Evaluate effectiveness of antiarrhythmic and heart rate control medications as ordered  - Initiate emergency measures for life threatening arrhythmias  - Monitor electrolytes and administer replacement therapy as ordered  Outcome: Progressing     Problem: MUSCULOSKELETAL - ADULT  Goal: Return mobility to safest level of function  Description: INTERVENTIONS:  - Assess patient stability and activity tolerance for standing, transferring and ambulating w/ or w/o assistive devices  - Assist with transfers and ambulation using safe patient handling equipment as needed  - Ensure adequate protection for wounds/incisions during mobilization  - Obtain PT/OT consults as needed  - Advance activity as appropriate  - Communicate ordered activity level and limitations with patient/family  Outcome: Progressing     Problem: NEUROLOGICAL - ADULT  Goal: Achieves stable or improved neurological status  Description: INTERVENTIONS  - Assess for and report changes in neurological status  - Initiate measures to prevent increased intracranial pressure  - Maintain blood pressure and fluid volume within ordered parameters to optimize cerebral perfusion and minimize risk of hemorrhage  - Monitor temperature, glucose, and sodium.  Initiate appropriate interventions as ordered  Outcome: Progressing  Goal: Achieves maximal functionality and self care  Description: INTERVENTIONS  - Monitor swallowing and airway patency with patient fatigue and changes in neurological status  - Encourage and assist patient to increase activity and self care with guidance from PT/OT  - Encourage visually impaired, hearing impaired and aphasic patients to use assistive/communication devices  Outcome: Progressing     Problem: Impaired Functional Mobility  Goal: Achieve highest/safest level of mobility/gait  Description: Interventions:  - Assess patient's functional ability and stability  - Promote increasing activity/tolerance for mobility and gait  - Educate and engage patient/family in tolerated activity level and precautions  Outcome: Progressing

## 2022-04-12 NOTE — CM/SW NOTE
SW received message from patient's daughter, Pawan Quijano inquiring if patient can go to [place]. SW notified Pawan Quijano that at this time patient is only meeting qualifications for [place] according to PT/OT evaluations. 343pm  Called and spoke with daughter, Pawan Quijano. Pawan Quijano states that she is meeting with her siblings and patient tonight for final choice. Will call SW with final decision. PLAN: [facility]- list provided to daughter and family, awaiting choice.      AGA Do, [facility]    [id number]

## 2022-04-12 NOTE — PLAN OF CARE
Problem: Patient Centered Care  Goal: Patient preferences are identified and integrated in the patient's plan of care  Description: Interventions:  - What would you like us to know as we care for you?   - Provide timely, complete, and accurate information to patient/family  - Incorporate patient and family knowledge, values, beliefs, and cultural backgrounds into the planning and delivery of care  - Encourage patient/family to participate in care and decision-making at the level they choose  - Honor patient and family perspectives and choices  Outcome: Progressing     Problem: Patient/Family Goals  Goal: Patient/Family Long Term Goal  Description: Patient's Long Term Goal:     Interventions:  -   - See additional Care Plan goals for specific interventions  Outcome: Progressing  Goal: Patient/Family Short Term Goal  Description: Patient's Short Term Goal:     Interventions:   -   - See additional Care Plan goals for specific interventions  Outcome: Progressing     Problem: CARDIOVASCULAR - ADULT  Goal: Maintains optimal cardiac output and hemodynamic stability  Description: INTERVENTIONS:  - Monitor vital signs, rhythm, and trends  - Monitor for bleeding, hypotension and signs of decreased cardiac output  - Evaluate effectiveness of vasoactive medications to optimize hemodynamic stability  - Monitor arterial and/or venous puncture sites for bleeding and/or hematoma  - Assess quality of pulses, skin color and temperature  - Assess for signs of decreased coronary artery perfusion - ex.  Angina  - Evaluate fluid balance, assess for edema, trend weights  Outcome: Progressing  Goal: Absence of cardiac arrhythmias or at baseline  Description: INTERVENTIONS:  - Continuous cardiac monitoring, monitor vital signs, obtain 12 lead EKG if indicated  - Evaluate effectiveness of antiarrhythmic and heart rate control medications as ordered  - Initiate emergency measures for life threatening arrhythmias  - Monitor electrolytes and administer replacement therapy as ordered  Outcome: Progressing     Problem: MUSCULOSKELETAL - ADULT  Goal: Return mobility to safest level of function  Description: INTERVENTIONS:  - Assess patient stability and activity tolerance for standing, transferring and ambulating w/ or w/o assistive devices  - Assist with transfers and ambulation using safe patient handling equipment as needed  - Ensure adequate protection for wounds/incisions during mobilization  - Obtain PT/OT consults as needed  - Advance activity as appropriate  - Communicate ordered activity level and limitations with patient/family  Outcome: Progressing     Problem: NEUROLOGICAL - ADULT  Goal: Achieves stable or improved neurological status  Description: INTERVENTIONS  - Assess for and report changes in neurological status  - Initiate measures to prevent increased intracranial pressure  - Maintain blood pressure and fluid volume within ordered parameters to optimize cerebral perfusion and minimize risk of hemorrhage  - Monitor temperature, glucose, and sodium. Initiate appropriate interventions as ordered  Outcome: Progressing  Goal: Achieves maximal functionality and self care  Description: INTERVENTIONS  - Monitor swallowing and airway patency with patient fatigue and changes in neurological status  - Encourage and assist patient to increase activity and self care with guidance from PT/OT  - Encourage visually impaired, hearing impaired and aphasic patients to use assistive/communication devices  Outcome: Progressing     Problem: Impaired Functional Mobility  Goal: Achieve highest/safest level of mobility/gait  Description: Interventions:  - Assess patient's functional ability and stability  - Promote increasing activity/tolerance for mobility and gait  - Educate and engage patient/family in tolerated activity level and precautions    Outcome: Progressing   No acute changes or distress noted. Reviewed safety precautions and plan of care.   Truong Milelr in place. Patient still for MRI of cervical spine. Patient is resting comfortably at this time. Continue to monitor.

## 2022-04-13 ENCOUNTER — APPOINTMENT (OUTPATIENT)
Dept: MRI IMAGING | Facility: HOSPITAL | Age: 87
End: 2022-04-13
Attending: PHYSICAL MEDICINE & REHABILITATION
Payer: MEDICARE

## 2022-04-13 PROCEDURE — 72141 MRI NECK SPINE W/O DYE: CPT | Performed by: PHYSICAL MEDICINE & REHABILITATION

## 2022-04-13 RX ORDER — FAMOTIDINE 20 MG/1
10 TABLET, FILM COATED ORAL DAILY
Qty: 30 TABLET | Refills: 0 | Status: SHIPPED | OUTPATIENT
Start: 2022-04-13

## 2022-04-13 RX ORDER — CLOTRIMAZOLE 1 %
1 CREAM (GRAM) TOPICAL 2 TIMES DAILY
Qty: 1 EACH | Refills: 2 | Status: SHIPPED | COMMUNITY
Start: 2022-04-13

## 2022-04-13 RX ORDER — ASPIRIN 81 MG/1
81 TABLET ORAL DAILY
Qty: 30 TABLET | Refills: 0 | Status: SHIPPED | OUTPATIENT
Start: 2022-04-14

## 2022-04-13 RX ORDER — MELATONIN
325 WEEKLY
Qty: 30 TABLET | Refills: 0 | Status: SHIPPED | OUTPATIENT
Start: 2022-04-17

## 2022-04-13 NOTE — PLAN OF CARE
Problem: Patient Centered Care  Goal: Patient preferences are identified and integrated in the patient's plan of care  Description: Interventions:  - What would you like us to know as we care for you?   - Provide timely, complete, and accurate information to patient/family  - Incorporate patient and family knowledge, values, beliefs, and cultural backgrounds into the planning and delivery of care  - Encourage patient/family to participate in care and decision-making at the level they choose  - Honor patient and family perspectives and choices  Outcome: Progressing     Problem: Patient/Family Goals  Goal: Patient/Family Long Term Goal  Description: Patient's Long Term Goal:     Interventions:  -   - See additional Care Plan goals for specific interventions  Outcome: Progressing  Goal: Patient/Family Short Term Goal  Description: Patient's Short Term Goal:     Interventions:   -   - See additional Care Plan goals for specific interventions  Outcome: Progressing     Problem: CARDIOVASCULAR - ADULT  Goal: Maintains optimal cardiac output and hemodynamic stability  Description: INTERVENTIONS:  - Monitor vital signs, rhythm, and trends  - Monitor for bleeding, hypotension and signs of decreased cardiac output  - Evaluate effectiveness of vasoactive medications to optimize hemodynamic stability  - Monitor arterial and/or venous puncture sites for bleeding and/or hematoma  - Assess quality of pulses, skin color and temperature  - Assess for signs of decreased coronary artery perfusion - ex.  Angina  - Evaluate fluid balance, assess for edema, trend weights  Outcome: Progressing  Goal: Absence of cardiac arrhythmias or at baseline  Description: INTERVENTIONS:  - Continuous cardiac monitoring, monitor vital signs, obtain 12 lead EKG if indicated  - Evaluate effectiveness of antiarrhythmic and heart rate control medications as ordered  - Initiate emergency measures for life threatening arrhythmias  - Monitor electrolytes and administer replacement therapy as ordered  Outcome: Progressing     Problem: MUSCULOSKELETAL - ADULT  Goal: Return mobility to safest level of function  Description: INTERVENTIONS:  - Assess patient stability and activity tolerance for standing, transferring and ambulating w/ or w/o assistive devices  - Assist with transfers and ambulation using safe patient handling equipment as needed  - Ensure adequate protection for wounds/incisions during mobilization  - Obtain PT/OT consults as needed  - Advance activity as appropriate  - Communicate ordered activity level and limitations with patient/family  Outcome: Progressing     Problem: NEUROLOGICAL - ADULT  Goal: Achieves stable or improved neurological status  Description: INTERVENTIONS  - Assess for and report changes in neurological status  - Initiate measures to prevent increased intracranial pressure  - Maintain blood pressure and fluid volume within ordered parameters to optimize cerebral perfusion and minimize risk of hemorrhage  - Monitor temperature, glucose, and sodium. Initiate appropriate interventions as ordered  Outcome: Progressing  Goal: Achieves maximal functionality and self care  Description: INTERVENTIONS  - Monitor swallowing and airway patency with patient fatigue and changes in neurological status  - Encourage and assist patient to increase activity and self care with guidance from PT/OT  - Encourage visually impaired, hearing impaired and aphasic patients to use assistive/communication devices  Outcome: Progressing     Problem: Impaired Functional Mobility  Goal: Achieve highest/safest level of mobility/gait  Description: Interventions:  - Assess patient's functional ability and stability  - Promote increasing activity/tolerance for mobility and gait  - Educate and engage patient/family in tolerated activity level and precautions    Outcome: Progressing     No acute changes or distress noted at this time.  Reviewed safety measures and plan of care. Still for MRI of cervical spine. Purewick in place. Prompt skin care rendered. Continue to monitor.

## 2022-04-13 NOTE — CM/SW NOTE
SW received message from RN that family preference is [facility]. SW called and LVM for daughter, Pawan Quijano to confirm. Gregorio Hawkins confirmed bed avail tomorrow 4/14 if patient is medically cleared. PLAN: [facility] pending med clear.        AGA Do, [facility]    [id number]

## 2022-04-13 NOTE — PLAN OF CARE
Patient had MRI of cervical spine. Patient's daughter took wedding rings home during MRI. Patient denies pain. Purwick in place, draining. Remote tele in place, no calls received this shift. Plan to discharge to [facility] tomorrow for rehab. Patient's family aware of plan of care. Problem: Patient Centered Care  Goal: Patient preferences are identified and integrated in the patient's plan of care  Description: Interventions:  - What would you like us to know as we care for you? Lives home alone  - Provide timely, complete, and accurate information to patient/family  - Incorporate patient and family knowledge, values, beliefs, and cultural backgrounds into the planning and delivery of care  - Encourage patient/family to participate in care and decision-making at the level they choose  - Honor patient and family perspectives and choices  Outcome: Progressing     Problem: Patient/Family Goals  Goal: Patient/Family Long Term Goal  Description: Patient's Long Term Goal:     Interventions:  -   - See additional Care Plan goals for specific interventions  Outcome: Progressing  Goal: Patient/Family Short Term Goal  Description: Patient's Short Term Goal:     Interventions:   -   - See additional Care Plan goals for specific interventions  Outcome: Progressing     Problem: CARDIOVASCULAR - ADULT  Goal: Maintains optimal cardiac output and hemodynamic stability  Description: INTERVENTIONS:  - Monitor vital signs, rhythm, and trends  - Monitor for bleeding, hypotension and signs of decreased cardiac output  - Evaluate effectiveness of vasoactive medications to optimize hemodynamic stability  - Monitor arterial and/or venous puncture sites for bleeding and/or hematoma  - Assess quality of pulses, skin color and temperature  - Assess for signs of decreased coronary artery perfusion - ex.  Angina  - Evaluate fluid balance, assess for edema, trend weights  Outcome: Progressing  Goal: Absence of cardiac arrhythmias or at baseline  Description: INTERVENTIONS:  - Continuous cardiac monitoring, monitor vital signs, obtain 12 lead EKG if indicated  - Evaluate effectiveness of antiarrhythmic and heart rate control medications as ordered  - Initiate emergency measures for life threatening arrhythmias  - Monitor electrolytes and administer replacement therapy as ordered  Outcome: Progressing     Problem: MUSCULOSKELETAL - ADULT  Goal: Return mobility to safest level of function  Description: INTERVENTIONS:  - Assess patient stability and activity tolerance for standing, transferring and ambulating w/ or w/o assistive devices  - Assist with transfers and ambulation using safe patient handling equipment as needed  - Ensure adequate protection for wounds/incisions during mobilization  - Obtain PT/OT consults as needed  - Advance activity as appropriate  - Communicate ordered activity level and limitations with patient/family  Outcome: Progressing     Problem: NEUROLOGICAL - ADULT  Goal: Achieves stable or improved neurological status  Description: INTERVENTIONS  - Assess for and report changes in neurological status  - Initiate measures to prevent increased intracranial pressure  - Maintain blood pressure and fluid volume within ordered parameters to optimize cerebral perfusion and minimize risk of hemorrhage  - Monitor temperature, glucose, and sodium.  Initiate appropriate interventions as ordered  Outcome: Progressing  Goal: Achieves maximal functionality and self care  Description: INTERVENTIONS  - Monitor swallowing and airway patency with patient fatigue and changes in neurological status  - Encourage and assist patient to increase activity and self care with guidance from PT/OT  - Encourage visually impaired, hearing impaired and aphasic patients to use assistive/communication devices  Outcome: Progressing     Problem: Impaired Functional Mobility  Goal: Achieve highest/safest level of mobility/gait  Description: Interventions:  - Assess patient's functional ability and stability  - Promote increasing activity/tolerance for mobility and gait  - Educate and engage patient/family in tolerated activity level and precautions    Outcome: Progressing

## 2022-04-14 VITALS
HEIGHT: 65 IN | WEIGHT: 116 LBS | TEMPERATURE: 98 F | SYSTOLIC BLOOD PRESSURE: 133 MMHG | DIASTOLIC BLOOD PRESSURE: 52 MMHG | OXYGEN SATURATION: 91 % | HEART RATE: 63 BPM | RESPIRATION RATE: 18 BRPM | BODY MASS INDEX: 19.33 KG/M2

## 2022-04-14 LAB — SARS-COV-2 RNA RESP QL NAA+PROBE: NOT DETECTED

## 2022-04-14 NOTE — PLAN OF CARE
Left arm precautions in place. No c/o pain or nausea overnight. Results from MRI cervical spin still pending. Repositioned as tolerated, slept well. Bed alarm engaged for safety. Frequent rounding. Plan is to go to [facility] upon discharge. Problem: Patient Centered Care  Goal: Patient preferences are identified and integrated in the patient's plan of care  Description: Interventions:  - What would you like us to know as we care for you?   - Provide timely, complete, and accurate information to patient/family  - Incorporate patient and family knowledge, values, beliefs, and cultural backgrounds into the planning and delivery of care  - Encourage patient/family to participate in care and decision-making at the level they choose  - Honor patient and family perspectives and choices  Outcome: Progressing     Problem: MUSCULOSKELETAL - ADULT  Goal: Return mobility to safest level of function  Description: INTERVENTIONS:  - Assess patient stability and activity tolerance for standing, transferring and ambulating w/ or w/o assistive devices  - Assist with transfers and ambulation using safe patient handling equipment as needed  - Ensure adequate protection for wounds/incisions during mobilization  - Obtain PT/OT consults as needed  - Advance activity as appropriate  - Communicate ordered activity level and limitations with patient/family  Outcome: Progressing     Problem: NEUROLOGICAL - ADULT  Goal: Achieves stable or improved neurological status  Description: INTERVENTIONS  - Assess for and report changes in neurological status  - Initiate measures to prevent increased intracranial pressure  - Maintain blood pressure and fluid volume within ordered parameters to optimize cerebral perfusion and minimize risk of hemorrhage  - Monitor temperature, glucose, and sodium.  Initiate appropriate interventions as ordered  Outcome: Progressing

## 2022-04-15 ENCOUNTER — SNF VISIT (OUTPATIENT)
Dept: INTERNAL MEDICINE CLINIC | Facility: SKILLED NURSING FACILITY | Age: 87
End: 2022-04-15

## 2022-04-15 PROCEDURE — 99309 SBSQ NF CARE MODERATE MDM 30: CPT | Performed by: NURSE PRACTITIONER

## 2022-04-15 PROCEDURE — 1123F ACP DISCUSS/DSCN MKR DOCD: CPT | Performed by: NURSE PRACTITIONER

## 2022-04-18 ENCOUNTER — SNF VISIT (OUTPATIENT)
Dept: INTERNAL MEDICINE CLINIC | Facility: SKILLED NURSING FACILITY | Age: 87
End: 2022-04-18

## 2022-04-18 PROCEDURE — 1111F DSCHRG MED/CURRENT MED MERGE: CPT | Performed by: NURSE PRACTITIONER

## 2022-04-18 PROCEDURE — 99308 SBSQ NF CARE LOW MDM 20: CPT | Performed by: NURSE PRACTITIONER

## 2022-04-25 ENCOUNTER — SNF VISIT (OUTPATIENT)
Dept: INTERNAL MEDICINE CLINIC | Facility: SKILLED NURSING FACILITY | Age: 87
End: 2022-04-25

## 2022-04-25 ENCOUNTER — TELEPHONE (OUTPATIENT)
Dept: HEMATOLOGY/ONCOLOGY | Facility: HOSPITAL | Age: 87
End: 2022-04-25

## 2022-04-25 NOTE — TELEPHONE ENCOUNTER
----- Message from Kenisha Loving RN sent at 3/22/2022  3:32 PM CDT -----  Patient was due for follow up in January - Breast- call and schedule with another provider.       Ron Brunson

## 2022-04-26 ENCOUNTER — TELEPHONE (OUTPATIENT)
Dept: HEMATOLOGY/ONCOLOGY | Facility: HOSPITAL | Age: 87
End: 2022-04-26

## 2022-05-06 ENCOUNTER — LAB REQUISITION (OUTPATIENT)
Dept: LAB | Facility: HOSPITAL | Age: 87
End: 2022-05-06
Payer: MEDICARE

## 2022-05-06 LAB
BASOPHILS # BLD AUTO: 0.03 X10(3) UL (ref 0–0.2)
BASOPHILS NFR BLD AUTO: 0.4 %
DEPRECATED RDW RBC AUTO: 57.9 FL (ref 35.1–46.3)
EOSINOPHIL # BLD AUTO: 0.28 X10(3) UL (ref 0–0.7)
EOSINOPHIL NFR BLD AUTO: 3.3 %
ERYTHROCYTE [DISTWIDTH] IN BLOOD BY AUTOMATED COUNT: 15.5 % (ref 11–15)
HCT VFR BLD AUTO: 26.7 %
HGB BLD-MCNC: 8.2 G/DL
IMM GRANULOCYTES # BLD AUTO: 0.08 X10(3) UL (ref 0–1)
IMM GRANULOCYTES NFR BLD: 0.9 %
LYMPHOCYTES # BLD AUTO: 0.8 X10(3) UL (ref 1–4)
LYMPHOCYTES NFR BLD AUTO: 9.4 %
MCH RBC QN AUTO: 31.7 PG (ref 26–34)
MCHC RBC AUTO-ENTMCNC: 30.7 G/DL (ref 31–37)
MCV RBC AUTO: 103.1 FL
MONOCYTES # BLD AUTO: 0.86 X10(3) UL (ref 0.1–1)
MONOCYTES NFR BLD AUTO: 10.1 %
NEUTROPHILS # BLD AUTO: 6.44 X10 (3) UL (ref 1.5–7.7)
NEUTROPHILS # BLD AUTO: 6.44 X10(3) UL (ref 1.5–7.7)
NEUTROPHILS NFR BLD AUTO: 75.9 %
PLATELET # BLD AUTO: 183 10(3)UL (ref 150–450)
RBC # BLD AUTO: 2.59 X10(6)UL
WBC # BLD AUTO: 8.5 X10(3) UL (ref 4–11)

## 2022-05-06 PROCEDURE — 85025 COMPLETE CBC W/AUTO DIFF WBC: CPT | Performed by: INTERNAL MEDICINE

## 2022-05-09 ENCOUNTER — HOSPITAL ENCOUNTER (EMERGENCY)
Facility: HOSPITAL | Age: 87
Discharge: HOME | End: 2022-05-09
Attending: EMERGENCY MEDICINE
Payer: MEDICARE

## 2022-05-09 VITALS
HEIGHT: 62 IN | SYSTOLIC BLOOD PRESSURE: 162 MMHG | OXYGEN SATURATION: 99 % | DIASTOLIC BLOOD PRESSURE: 52 MMHG | RESPIRATION RATE: 16 BRPM | HEART RATE: 74 BPM | WEIGHT: 120 LBS | BODY MASS INDEX: 22.08 KG/M2 | TEMPERATURE: 98 F

## 2022-05-09 DIAGNOSIS — R53.1 WEAKNESS GENERALIZED: Primary | ICD-10-CM

## 2022-05-09 DIAGNOSIS — D64.9 CHRONIC ANEMIA: ICD-10-CM

## 2022-05-09 LAB
ANION GAP SERPL CALC-SCNC: 5 MMOL/L (ref 0–18)
BASOPHILS # BLD AUTO: 0.03 X10(3) UL (ref 0–0.2)
BASOPHILS NFR BLD AUTO: 0.4 %
BILIRUB UR QL: NEGATIVE
BUN BLD-MCNC: 74 MG/DL (ref 7–18)
BUN/CREAT SERPL: 45.7 (ref 10–20)
CALCIUM BLD-MCNC: 8.9 MG/DL (ref 8.5–10.1)
CHLORIDE SERPL-SCNC: 108 MMOL/L (ref 98–112)
CLARITY UR: CLEAR
CO2 SERPL-SCNC: 27 MMOL/L (ref 21–32)
COLOR UR: YELLOW
CREAT BLD-MCNC: 1.62 MG/DL
DEPRECATED RDW RBC AUTO: 58.3 FL (ref 35.1–46.3)
EOSINOPHIL # BLD AUTO: 0.3 X10(3) UL (ref 0–0.7)
EOSINOPHIL NFR BLD AUTO: 3.7 %
ERYTHROCYTE [DISTWIDTH] IN BLOOD BY AUTOMATED COUNT: 15.2 % (ref 11–15)
GLUCOSE BLD-MCNC: 111 MG/DL (ref 70–99)
GLUCOSE UR-MCNC: NEGATIVE MG/DL
HCT VFR BLD AUTO: 28.3 %
HGB BLD-MCNC: 8.4 G/DL
HGB UR QL STRIP.AUTO: NEGATIVE
IMM GRANULOCYTES # BLD AUTO: 0.08 X10(3) UL (ref 0–1)
IMM GRANULOCYTES NFR BLD: 1 %
KETONES UR-MCNC: NEGATIVE MG/DL
LEUKOCYTE ESTERASE UR QL STRIP.AUTO: NEGATIVE
LYMPHOCYTES # BLD AUTO: 0.94 X10(3) UL (ref 1–4)
LYMPHOCYTES NFR BLD AUTO: 11.7 %
MCH RBC QN AUTO: 30.9 PG (ref 26–34)
MCHC RBC AUTO-ENTMCNC: 29.7 G/DL (ref 31–37)
MCV RBC AUTO: 104 FL
MONOCYTES # BLD AUTO: 0.72 X10(3) UL (ref 0.1–1)
MONOCYTES NFR BLD AUTO: 9 %
NEUTROPHILS # BLD AUTO: 5.95 X10 (3) UL (ref 1.5–7.7)
NEUTROPHILS # BLD AUTO: 5.95 X10(3) UL (ref 1.5–7.7)
NEUTROPHILS NFR BLD AUTO: 74.2 %
NITRITE UR QL STRIP.AUTO: NEGATIVE
OSMOLALITY SERPL CALC.SUM OF ELEC: 313 MOSM/KG (ref 275–295)
PH UR: 6 [PH] (ref 5–8)
PLATELET # BLD AUTO: 182 10(3)UL (ref 150–450)
POTASSIUM SERPL-SCNC: 4.3 MMOL/L (ref 3.5–5.1)
PROT UR-MCNC: NEGATIVE MG/DL
RBC # BLD AUTO: 2.72 X10(6)UL
SARS-COV-2 RNA RESP QL NAA+PROBE: NOT DETECTED
SODIUM SERPL-SCNC: 140 MMOL/L (ref 136–145)
SP GR UR STRIP: 1.01 (ref 1–1.03)
UROBILINOGEN UR STRIP-ACNC: <2
VIT C UR-MCNC: NEGATIVE MG/DL
WBC # BLD AUTO: 8 X10(3) UL (ref 4–11)

## 2022-05-09 PROCEDURE — 81003 URINALYSIS AUTO W/O SCOPE: CPT | Performed by: EMERGENCY MEDICINE

## 2022-05-09 PROCEDURE — 80048 BASIC METABOLIC PNL TOTAL CA: CPT | Performed by: EMERGENCY MEDICINE

## 2022-05-09 PROCEDURE — 99285 EMERGENCY DEPT VISIT HI MDM: CPT

## 2022-05-09 PROCEDURE — 93005 ELECTROCARDIOGRAM TRACING: CPT

## 2022-05-09 PROCEDURE — 93010 ELECTROCARDIOGRAM REPORT: CPT | Performed by: EMERGENCY MEDICINE

## 2022-05-09 PROCEDURE — 99284 EMERGENCY DEPT VISIT MOD MDM: CPT

## 2022-05-09 PROCEDURE — 85025 COMPLETE CBC W/AUTO DIFF WBC: CPT | Performed by: EMERGENCY MEDICINE

## 2022-05-09 PROCEDURE — 96360 HYDRATION IV INFUSION INIT: CPT

## 2022-05-09 NOTE — CM/SW NOTE
Patient accepted to [facility]. Spoke with Yang Benson, liaison at [facility], and she stated that there is no need to do PASRR as patient was just discharged from [facility] on Thursday 5/5/22. [facility] arranged to transport patient.     ETA 5:30pm    PCS completed in [facility]    Receiving RN:  [phone]

## 2022-05-09 NOTE — CM/SW NOTE
Spoke with patient and dtr Doug House at bedside, regarding her current situation and her possible needs. Patient stated that she lives alone and uses a walker at home to get around. Patient was discharged from [facility] on Thursday 5/5/22. Patient is interested in going back to [facility] again. Patient is vaccinated and boostered. Rapid Covid Negative. [facility] called Felipe Wood at [facility] to inquire on their bed situation as patient is only interested in [facility] since she was just there. Felipe Wood will call EDCM back.

## 2022-05-09 NOTE — ED INITIAL ASSESSMENT (HPI)
Pt sent from home by PCP, for severe anemia.  Pt experiencing weakness for the better part of a month

## 2022-05-11 ENCOUNTER — SNF VISIT (OUTPATIENT)
Dept: INTERNAL MEDICINE CLINIC | Facility: SKILLED NURSING FACILITY | Age: 87
End: 2022-05-11

## 2022-05-11 ENCOUNTER — TELEPHONE (OUTPATIENT)
Dept: HEMATOLOGY/ONCOLOGY | Facility: HOSPITAL | Age: 87
End: 2022-05-11

## 2022-05-11 PROCEDURE — 99309 SBSQ NF CARE MODERATE MDM 30: CPT | Performed by: NURSE PRACTITIONER

## 2022-05-11 PROCEDURE — 1111F DSCHRG MED/CURRENT MED MERGE: CPT | Performed by: NURSE PRACTITIONER

## 2022-05-11 NOTE — TELEPHONE ENCOUNTER
Will talk about port removal at appointment in June. Lab appointment made for anemia studies, iron studies before My[facility] appt. Appreciative of call back.

## 2022-05-11 NOTE — TELEPHONE ENCOUNTER
Patient has a port that she no longer uses but still has, patient's daughter is wondering if she needs to get a port flush as she has not had one in awhile.  She has a follow up appointment on 6/1 with Dr Emerita Erazo (she is a former Dr Haris Ellington patient), also does she need any labs prior to seeing Dr Emerita Erazo on 6/1

## 2022-05-15 ENCOUNTER — HOSPITAL ENCOUNTER (INPATIENT)
Facility: HOSPITAL | Age: 87
LOS: 4 days | Discharge: NURSING, REHAB OR LONG-TERM CARE | End: 2022-05-20
Attending: EMERGENCY MEDICINE | Admitting: INTERNAL MEDICINE
Payer: MEDICARE

## 2022-05-15 ENCOUNTER — APPOINTMENT (OUTPATIENT)
Dept: GENERAL RADIOLOGY | Facility: HOSPITAL | Age: 87
End: 2022-05-15
Attending: EMERGENCY MEDICINE
Payer: MEDICARE

## 2022-05-15 DIAGNOSIS — I63.9 ACUTE CVA (CEREBROVASCULAR ACCIDENT) (HCC): ICD-10-CM

## 2022-05-15 DIAGNOSIS — N17.9 AKI (ACUTE KIDNEY INJURY) (HCC): ICD-10-CM

## 2022-05-15 DIAGNOSIS — R41.82 ALTERED MENTAL STATUS, UNSPECIFIED ALTERED MENTAL STATUS TYPE: Primary | ICD-10-CM

## 2022-05-15 DIAGNOSIS — E87.5 HYPERKALEMIA: ICD-10-CM

## 2022-05-15 LAB
ANION GAP SERPL CALC-SCNC: 3 MMOL/L (ref 0–18)
BASOPHILS # BLD AUTO: 0.05 X10(3) UL (ref 0–0.2)
BASOPHILS NFR BLD AUTO: 0.5 %
BILIRUB UR QL: NEGATIVE
BUN BLD-MCNC: 81 MG/DL (ref 7–18)
BUN/CREAT SERPL: 50.9 (ref 10–20)
CALCIUM BLD-MCNC: 8.9 MG/DL (ref 8.5–10.1)
CHLORIDE SERPL-SCNC: 114 MMOL/L (ref 98–112)
CLARITY UR: CLEAR
CO2 SERPL-SCNC: 27 MMOL/L (ref 21–32)
COLOR UR: YELLOW
CREAT BLD-MCNC: 1.59 MG/DL
DEPRECATED RDW RBC AUTO: 59.9 FL (ref 35.1–46.3)
EOSINOPHIL # BLD AUTO: 0.16 X10(3) UL (ref 0–0.7)
EOSINOPHIL NFR BLD AUTO: 1.5 %
ERYTHROCYTE [DISTWIDTH] IN BLOOD BY AUTOMATED COUNT: 15.5 % (ref 11–15)
GLUCOSE BLD-MCNC: 141 MG/DL (ref 70–99)
GLUCOSE UR-MCNC: NEGATIVE MG/DL
HCT VFR BLD AUTO: 24.4 %
HGB BLD-MCNC: 7.3 G/DL
HGB UR QL STRIP.AUTO: NEGATIVE
IMM GRANULOCYTES # BLD AUTO: 0.18 X10(3) UL (ref 0–1)
IMM GRANULOCYTES NFR BLD: 1.7 %
KETONES UR-MCNC: NEGATIVE MG/DL
LEUKOCYTE ESTERASE UR QL STRIP.AUTO: NEGATIVE
LYMPHOCYTES # BLD AUTO: 0.96 X10(3) UL (ref 1–4)
LYMPHOCYTES NFR BLD AUTO: 9.3 %
MCH RBC QN AUTO: 31.7 PG (ref 26–34)
MCHC RBC AUTO-ENTMCNC: 29.9 G/DL (ref 31–37)
MCV RBC AUTO: 106.1 FL
MONOCYTES # BLD AUTO: 0.85 X10(3) UL (ref 0.1–1)
MONOCYTES NFR BLD AUTO: 8.2 %
NEUTROPHILS # BLD AUTO: 8.13 X10 (3) UL (ref 1.5–7.7)
NEUTROPHILS # BLD AUTO: 8.13 X10(3) UL (ref 1.5–7.7)
NEUTROPHILS NFR BLD AUTO: 78.8 %
NITRITE UR QL STRIP.AUTO: NEGATIVE
OSMOLALITY SERPL CALC.SUM OF ELEC: 325 MOSM/KG (ref 275–295)
PH UR: 6 [PH] (ref 5–8)
PLATELET # BLD AUTO: 137 10(3)UL (ref 150–450)
POTASSIUM SERPL-SCNC: 5.6 MMOL/L (ref 3.5–5.1)
PROT UR-MCNC: NEGATIVE MG/DL
RBC # BLD AUTO: 2.3 X10(6)UL
SODIUM SERPL-SCNC: 144 MMOL/L (ref 136–145)
SP GR UR STRIP: 1.01 (ref 1–1.03)
UROBILINOGEN UR STRIP-ACNC: <2
VIT C UR-MCNC: 20 MG/DL
WBC # BLD AUTO: 10.3 X10(3) UL (ref 4–11)

## 2022-05-15 PROCEDURE — 71045 X-RAY EXAM CHEST 1 VIEW: CPT | Performed by: EMERGENCY MEDICINE

## 2022-05-16 ENCOUNTER — APPOINTMENT (OUTPATIENT)
Dept: CT IMAGING | Facility: HOSPITAL | Age: 87
End: 2022-05-16
Attending: EMERGENCY MEDICINE
Payer: MEDICARE

## 2022-05-16 ENCOUNTER — APPOINTMENT (OUTPATIENT)
Dept: MRI IMAGING | Facility: HOSPITAL | Age: 87
End: 2022-05-16
Attending: Other
Payer: MEDICARE

## 2022-05-16 ENCOUNTER — APPOINTMENT (OUTPATIENT)
Dept: CV DIAGNOSTICS | Facility: HOSPITAL | Age: 87
End: 2022-05-16
Attending: Other
Payer: MEDICARE

## 2022-05-16 PROBLEM — N17.9 AKI (ACUTE KIDNEY INJURY) (HCC): Status: ACTIVE | Noted: 2022-05-16

## 2022-05-16 PROBLEM — R41.82 ALTERED MENTAL STATUS: Status: ACTIVE | Noted: 2022-05-16

## 2022-05-16 PROBLEM — R41.82 ALTERED MENTAL STATUS, UNSPECIFIED ALTERED MENTAL STATUS TYPE: Status: ACTIVE | Noted: 2022-05-16

## 2022-05-16 PROBLEM — E87.5 HYPERKALEMIA: Status: ACTIVE | Noted: 2022-05-16

## 2022-05-16 PROBLEM — I63.412 CEREBROVASCULAR ACCIDENT (CVA) DUE TO EMBOLISM OF LEFT MIDDLE CEREBRAL ARTERY (HCC): Status: ACTIVE | Noted: 2022-05-16

## 2022-05-16 PROBLEM — I63.9 ACUTE CVA (CEREBROVASCULAR ACCIDENT) (HCC): Status: ACTIVE | Noted: 2022-05-16

## 2022-05-16 LAB
ANTIBODY SCREEN: NEGATIVE
CHOLEST SERPL-MCNC: 246 MG/DL (ref ?–200)
ERYTHROCYTE [SEDIMENTATION RATE] IN BLOOD: 16 MM/HR
EST. AVERAGE GLUCOSE BLD GHB EST-MCNC: 114 MG/DL (ref 68–126)
GLUCOSE BLDC GLUCOMTR-MCNC: 115 MG/DL (ref 70–99)
GLUCOSE BLDC GLUCOMTR-MCNC: 131 MG/DL (ref 70–99)
GLUCOSE BLDC GLUCOMTR-MCNC: 140 MG/DL (ref 70–99)
HBA1C MFR BLD: 5.6 % (ref ?–5.7)
HCT VFR BLD AUTO: 26 %
HDLC SERPL-MCNC: 67 MG/DL (ref 40–59)
HGB BLD-MCNC: 8.2 G/DL
LDLC SERPL CALC-MCNC: 158 MG/DL (ref ?–100)
NONHDLC SERPL-MCNC: 179 MG/DL (ref ?–130)
RH BLOOD TYPE: NEGATIVE
SARS-COV-2 RNA RESP QL NAA+PROBE: NOT DETECTED
TRIGL SERPL-MCNC: 118 MG/DL (ref 30–149)
VLDLC SERPL CALC-MCNC: 23 MG/DL (ref 0–30)

## 2022-05-16 PROCEDURE — 99223 1ST HOSP IP/OBS HIGH 75: CPT | Performed by: OTHER

## 2022-05-16 PROCEDURE — 30233N1 TRANSFUSION OF NONAUTOLOGOUS RED BLOOD CELLS INTO PERIPHERAL VEIN, PERCUTANEOUS APPROACH: ICD-10-PCS | Performed by: INTERNAL MEDICINE

## 2022-05-16 PROCEDURE — 70450 CT HEAD/BRAIN W/O DYE: CPT | Performed by: EMERGENCY MEDICINE

## 2022-05-16 PROCEDURE — 99222 1ST HOSP IP/OBS MODERATE 55: CPT | Performed by: INTERNAL MEDICINE

## 2022-05-16 PROCEDURE — 70551 MRI BRAIN STEM W/O DYE: CPT | Performed by: OTHER

## 2022-05-16 PROCEDURE — 70498 CT ANGIOGRAPHY NECK: CPT | Performed by: EMERGENCY MEDICINE

## 2022-05-16 PROCEDURE — 70496 CT ANGIOGRAPHY HEAD: CPT | Performed by: EMERGENCY MEDICINE

## 2022-05-16 RX ORDER — SODIUM CHLORIDE 9 MG/ML
INJECTION, SOLUTION INTRAVENOUS ONCE
Status: COMPLETED | OUTPATIENT
Start: 2022-05-16 | End: 2022-05-16

## 2022-05-16 RX ORDER — SODIUM CHLORIDE 9 MG/ML
INJECTION, SOLUTION INTRAVENOUS CONTINUOUS
Status: DISCONTINUED | OUTPATIENT
Start: 2022-05-16 | End: 2022-05-20

## 2022-05-16 RX ORDER — CLOTRIMAZOLE 1 %
1 CREAM (GRAM) TOPICAL 2 TIMES DAILY
Status: DISCONTINUED | OUTPATIENT
Start: 2022-05-16 | End: 2022-05-20

## 2022-05-16 RX ORDER — SODIUM CHLORIDE 9 MG/ML
INJECTION, SOLUTION INTRAVENOUS CONTINUOUS
Status: ACTIVE | OUTPATIENT
Start: 2022-05-16 | End: 2022-05-16

## 2022-05-16 RX ORDER — METOCLOPRAMIDE HYDROCHLORIDE 5 MG/ML
5 INJECTION INTRAMUSCULAR; INTRAVENOUS EVERY 8 HOURS PRN
Status: DISCONTINUED | OUTPATIENT
Start: 2022-05-16 | End: 2022-05-20

## 2022-05-16 RX ORDER — ONDANSETRON 2 MG/ML
4 INJECTION INTRAMUSCULAR; INTRAVENOUS EVERY 6 HOURS PRN
Status: DISCONTINUED | OUTPATIENT
Start: 2022-05-16 | End: 2022-05-20

## 2022-05-16 RX ORDER — ACETAMINOPHEN 650 MG/1
650 SUPPOSITORY RECTAL EVERY 4 HOURS PRN
Status: DISCONTINUED | OUTPATIENT
Start: 2022-05-16 | End: 2022-05-20

## 2022-05-16 RX ORDER — ASPIRIN 81 MG/1
81 TABLET ORAL DAILY
Status: DISCONTINUED | OUTPATIENT
Start: 2022-05-16 | End: 2022-05-16

## 2022-05-16 RX ORDER — ASPIRIN 81 MG/1
324 TABLET, CHEWABLE ORAL ONCE
Status: DISCONTINUED | OUTPATIENT
Start: 2022-05-16 | End: 2022-05-16

## 2022-05-16 RX ORDER — LABETALOL HYDROCHLORIDE 5 MG/ML
10 INJECTION, SOLUTION INTRAVENOUS EVERY 10 MIN PRN
Status: DISCONTINUED | OUTPATIENT
Start: 2022-05-16 | End: 2022-05-20

## 2022-05-16 RX ORDER — ASPIRIN 325 MG
325 TABLET ORAL DAILY
Status: DISCONTINUED | OUTPATIENT
Start: 2022-05-16 | End: 2022-05-20

## 2022-05-16 RX ORDER — ATORVASTATIN CALCIUM 80 MG/1
80 TABLET, FILM COATED ORAL NIGHTLY
Status: DISCONTINUED | OUTPATIENT
Start: 2022-05-16 | End: 2022-05-20

## 2022-05-16 RX ORDER — HEPARIN SODIUM 5000 [USP'U]/ML
5000 INJECTION, SOLUTION INTRAVENOUS; SUBCUTANEOUS EVERY 12 HOURS SCHEDULED
Status: DISCONTINUED | OUTPATIENT
Start: 2022-05-16 | End: 2022-05-20

## 2022-05-16 RX ORDER — CLOPIDOGREL BISULFATE 75 MG/1
75 TABLET ORAL ONCE
Status: DISCONTINUED | OUTPATIENT
Start: 2022-05-16 | End: 2022-05-16

## 2022-05-16 RX ORDER — FAMOTIDINE 20 MG/1
10 TABLET, FILM COATED ORAL DAILY
Status: DISCONTINUED | OUTPATIENT
Start: 2022-05-16 | End: 2022-05-20

## 2022-05-16 RX ORDER — ESCITALOPRAM OXALATE 5 MG/1
5 TABLET ORAL DAILY
Refills: 5 | Status: DISCONTINUED | OUTPATIENT
Start: 2022-05-16 | End: 2022-05-20

## 2022-05-16 RX ORDER — HEPARIN SODIUM 5000 [USP'U]/ML
5000 INJECTION, SOLUTION INTRAVENOUS; SUBCUTANEOUS EVERY 12 HOURS SCHEDULED
Status: DISCONTINUED | OUTPATIENT
Start: 2022-05-16 | End: 2022-05-16

## 2022-05-16 RX ORDER — HYDRALAZINE HYDROCHLORIDE 20 MG/ML
10 INJECTION INTRAMUSCULAR; INTRAVENOUS EVERY 2 HOUR PRN
Status: DISCONTINUED | OUTPATIENT
Start: 2022-05-16 | End: 2022-05-20

## 2022-05-16 RX ORDER — MELATONIN
325 DAILY
Status: DISCONTINUED | OUTPATIENT
Start: 2022-05-16 | End: 2022-05-20

## 2022-05-16 RX ORDER — ASPIRIN 300 MG/1
300 SUPPOSITORY RECTAL DAILY
Status: DISCONTINUED | OUTPATIENT
Start: 2022-05-16 | End: 2022-05-20

## 2022-05-16 RX ORDER — LISINOPRIL 10 MG/1
10 TABLET ORAL DAILY
Status: DISCONTINUED | OUTPATIENT
Start: 2022-05-16 | End: 2022-05-19

## 2022-05-16 RX ORDER — ACETAMINOPHEN 325 MG/1
650 TABLET ORAL EVERY 4 HOURS PRN
Status: DISCONTINUED | OUTPATIENT
Start: 2022-05-16 | End: 2022-05-20

## 2022-05-16 RX ORDER — MELATONIN
1000 DAILY
Status: DISCONTINUED | OUTPATIENT
Start: 2022-05-16 | End: 2022-05-20

## 2022-05-16 RX ORDER — PREDNISONE 1 MG/1
5 TABLET ORAL DAILY
Status: DISCONTINUED | OUTPATIENT
Start: 2022-05-16 | End: 2022-05-20

## 2022-05-16 NOTE — ED QUICK NOTES
Spoke with Dwayne CARSON at [employer] [facility] to verify time frames. Last known well at 1700 and changes to speech/dysphagia noted at 2200.  MD Linette Gracia

## 2022-05-16 NOTE — ED QUICK NOTES
Pt to ED from [facility] via [facility] with c/o dysphagia. Per EMS, facility stated new onset of dysphagia, per patient paperwork, pt has a present Hx of dysphagia. Pt at baseline per staff at facility other than dysphagia and changes in speech. Ellyn Johnson Dysphagia noted 30 minutes PTA. Pt able to follow simple commands, able to turn to name, and slurred speech noted. NAD.

## 2022-05-16 NOTE — ED QUICK NOTES
Orders for admission, patient is aware of plan and ready to go upstairs. Any questions, please call ED RN Duong RN  at extension [phone]. Type of COVID test sent:Rapid  COVID Suspicion level: Negative    Titratable drug(s) infusing:  Rate:NA    LOC at time of transport:   Altered- disoriented     Other pertinent information  NPO  Dysphagia    CIWA score=  NIH score=

## 2022-05-16 NOTE — PROGRESS NOTES
Brief neurology pn    Cortez Willams is a 82year old  Woman w/ a pmhx sig. for polymyalgia rheumatica on prednisone, CKD, HTN, HLD, breast cancer s/p bilateral mastectomy, Hx left hip fracture, cervical neuroforaminal stenosis, use of a walker for ambulation, and recent admissions for diffuse generalized weakness/global decline who is now being seen for left MCA stroke. Patient presented from [facility]. Last known well  was 5 PM on 5/15/2022. Sometime after 9 PM on 5/15/2022 the staff noticed the patient was dysarthric and confused. She could not swallow her evening medications. Initially a stroke code was called for dysphagia, however because she has dysphagia at baseline stroke code was canceled. Symptoms were described as dysarthria and slow speech but no focal motor deficits. She was unable to provide a history in the ER. She was noted to have an ERMA and hyperkalemia. Her head CT demonstrated a hyperdensity in the left MCA territory with surrounding hypodense tissue. CTA was revealed a distal left M2 cutoff and a hyperdensity, consistent with an atheroembolism. NIH stroke scale was a 19 per the staff. Personally reviewed CTA head and neck. She was not a candidate for tPA because she was out of the time window. Not a candidate for medical thrombectomy because of her premorbid disability (uses a walker this modified Tensas scale score is a 3). Given how distal the atheroembolism is in the vessel, it is unlikely that mechanical thrombectomy  would be successful. Furthermore she has a significant amount of hypodense tissue on her head CT and CTA, indicating that much of the stroke has been completed. Ischemic stroke protocol has been ordered. We will start on gentle hydration. Infarcts in the superior division of the MCA are less likely to cause significant cerebral edema leading to herniation, but will need to monitor. Stat 3 sequence MRI to best delineate infarct volume.   Full consult to follow.     Michel Ho DO  Staff Vascular & General Neurology

## 2022-05-16 NOTE — CM/SW NOTE
05/16/22 0900   CM/SW Screening   Referral [phone] staff; Chart review;Nursing rounds   Patient's Current Mental Status at Time of Assessment Confused or unable to complete assessment   Patient's Home Environment Subacute Rehab   Post Acute Care Provider Upon Admission PP SNF   MDo for [facility] eval.    Per chart review pt was admitted from [address] [place] [address] where she has been since her last dc on 4/14/22. Pt presented with AMS, Neuro consulted. PT/OT/SLP evals pending. 5/17 1355  CM rec'd msg from PT/OT indicating rec is ACUTE rehab and requested PMR consult orders be obtained from MD.   Per RN she has concerns that pt will tolerate the required 3 hrs/day of therapy at an acute level. Per PT and OT they both feel the pt can tolerated rigors of program. CM requested RN contact MD for PMR orders. CM sent tentative IRF ref. and notified liaison at [place] [id number] of pending PMR orders. 5/18 1340  Per [facility] discussion family is agreeable to rehab at dc- NE vs Acute pending PMR eval. Reserved at [facility] if NE, ref pending for acute. Will need choice if acute. Video Swallow study scheduled. Family is agreeable to feeding tube if indicated. APN order for C-PC. Pt is current with [facility] so ref was called to [facility] with [facility] for C-PC. Plan  Reserved to return to  [facility] NE vs Acute rehab placement pending PMR recs and facility choice. C-PC with     / to remain available for support and/or discharge planning.      Ana Pedroza RN    Ext [phone]

## 2022-05-16 NOTE — ED PROVIDER NOTES
CT HEAD WITHOUT IV CONTRAST      IMPRESSION:  -Moderate sized area of left MCA territory gray-white differentiation loss involving the left insular cortex, left temporal lobe and left parietal lobe. There is an associated dense distal M2 MCA that is suspicious for thromboembolism. Although there was a vascular calcification in this region on the prior CT, the vascular calcification is enlarged and now Y shaped which is suspicious for thromboembolism. Case discussed with Dr. Shan Owusu at 0302 hrs. Nanda Khanna M.D. Pt and family notified of CT results. D/w dr Mcdermott Fails. NH notified-last seen normal at 5 pm. Out of window for tpa. Pt will need swallow study prior to asa/plavix. CTA HEAD AND NECK      IMPRESSION:  -Occlusive thromboembolism of distal left M2 MCA (series 5 image 74). -Moderate stenoses of the right worse than left bilateral ICAs proximally in the neck due to atherosclerosis.    -No intracranial aneurysm. -Dural venous sinuses are patent. Per radiology, no retrievable thrombus.

## 2022-05-16 NOTE — CM/SW NOTE
Department  notified of request for jacqueline OLIVIA referrals started. Assigned CM/SW to follow up with pt/family on further discharge planning.      Lance Pruett

## 2022-05-17 PROBLEM — Z71.89 GOALS OF CARE, COUNSELING/DISCUSSION: Status: ACTIVE | Noted: 2022-05-17

## 2022-05-17 PROBLEM — Z71.89 ADVANCE CARE PLANNING: Status: ACTIVE | Noted: 2022-05-17

## 2022-05-17 LAB
ANION GAP SERPL CALC-SCNC: 7 MMOL/L (ref 0–18)
BASOPHILS # BLD AUTO: 0.04 X10(3) UL (ref 0–0.2)
BASOPHILS NFR BLD AUTO: 0.5 %
BLOOD TYPE BARCODE: 9500
BLOOD TYPE BARCODE: 9500
BUN BLD-MCNC: 41 MG/DL (ref 7–18)
BUN/CREAT SERPL: 35.3 (ref 10–20)
CALCIUM BLD-MCNC: 8.2 MG/DL (ref 8.5–10.1)
CHLORIDE SERPL-SCNC: 116 MMOL/L (ref 98–112)
CO2 SERPL-SCNC: 22 MMOL/L (ref 21–32)
CREAT BLD-MCNC: 1.16 MG/DL
DEPRECATED RDW RBC AUTO: 60.1 FL (ref 35.1–46.3)
EOSINOPHIL # BLD AUTO: 0.15 X10(3) UL (ref 0–0.7)
EOSINOPHIL NFR BLD AUTO: 1.8 %
ERYTHROCYTE [DISTWIDTH] IN BLOOD BY AUTOMATED COUNT: 16.7 % (ref 11–15)
FOLATE SERPL-MCNC: 19.9 NG/ML (ref 8.7–?)
GLUCOSE BLD-MCNC: 94 MG/DL (ref 70–99)
GLUCOSE BLDC GLUCOMTR-MCNC: 104 MG/DL (ref 70–99)
GLUCOSE BLDC GLUCOMTR-MCNC: 105 MG/DL (ref 70–99)
GLUCOSE BLDC GLUCOMTR-MCNC: 92 MG/DL (ref 70–99)
GLUCOSE BLDC GLUCOMTR-MCNC: 93 MG/DL (ref 70–99)
HAPTOGLOB SERPL-MCNC: 177 MG/DL (ref 30–200)
HCT VFR BLD AUTO: 25.4 %
HGB BLD-MCNC: 7.9 G/DL
IMM GRANULOCYTES # BLD AUTO: 0.1 X10(3) UL (ref 0–1)
IMM GRANULOCYTES NFR BLD: 1.2 %
LDH SERPL L TO P-CCNC: 221 U/L
LYMPHOCYTES # BLD AUTO: 1.07 X10(3) UL (ref 1–4)
LYMPHOCYTES NFR BLD AUTO: 13 %
MCH RBC QN AUTO: 30.7 PG (ref 26–34)
MCHC RBC AUTO-ENTMCNC: 31.1 G/DL (ref 31–37)
MCV RBC AUTO: 98.8 FL
MONOCYTES # BLD AUTO: 0.94 X10(3) UL (ref 0.1–1)
MONOCYTES NFR BLD AUTO: 11.4 %
NEUTROPHILS # BLD AUTO: 5.96 X10 (3) UL (ref 1.5–7.7)
NEUTROPHILS # BLD AUTO: 5.96 X10(3) UL (ref 1.5–7.7)
NEUTROPHILS NFR BLD AUTO: 72.1 %
OSMOLALITY SERPL CALC.SUM OF ELEC: 310 MOSM/KG (ref 275–295)
PLATELET # BLD AUTO: 116 10(3)UL (ref 150–450)
POTASSIUM SERPL-SCNC: 4.3 MMOL/L (ref 3.5–5.1)
RBC # BLD AUTO: 2.57 X10(6)UL
SODIUM SERPL-SCNC: 145 MMOL/L (ref 136–145)
VIT B12 SERPL-MCNC: 468 PG/ML (ref 193–986)
WBC # BLD AUTO: 8.3 X10(3) UL (ref 4–11)

## 2022-05-17 PROCEDURE — 99232 SBSQ HOSP IP/OBS MODERATE 35: CPT | Performed by: INTERNAL MEDICINE

## 2022-05-17 PROCEDURE — 99222 1ST HOSP IP/OBS MODERATE 55: CPT | Performed by: NURSE PRACTITIONER

## 2022-05-17 PROCEDURE — 99233 SBSQ HOSP IP/OBS HIGH 50: CPT | Performed by: OTHER

## 2022-05-17 NOTE — CM/SW NOTE
BPCI-Advanced Medicare Program Note:  Plan of care reviewed for care coordination and discharge planning. Noted patient falls under  BPCI/Medicare program, with  for stroke. RUSLAN tool was used to help determine next care setting. Thus, [address] recommendations is for post acute facility pending patient progress. Case Management team is following for care coordination and discharge planning needs.

## 2022-05-17 NOTE — PLAN OF CARE
Problem: Patient Centered Care  Goal: Patient preferences are identified and integrated in the patient's plan of care  Description: Interventions:  - What would you like us to know as we care for you?   - Provide timely, complete, and accurate information to patient/family  - Incorporate patient and family knowledge, values, beliefs, and cultural backgrounds into the planning and delivery of care  - Encourage patient/family to participate in care and decision-making at the level they choose  - Honor patient and family perspectives and choices  Outcome: Progressing     Problem: Patient/Family Goals  Goal: Patient/Family Long Term Goal  Description: Patient's Long Term Goal:     Interventions:    - See additional Care Plan goals for specific interventions  Outcome: Progressing  Goal: Patient/Family Short Term Goal  Description: Patient's Short Term Goal:     Interventions:     - See additional Care Plan goals for specific interventions  Outcome: Progressing     Problem: NEUROLOGICAL - ADULT  Goal: Achieves stable or improved neurological status  Description: INTERVENTIONS  - Assess for and report changes in neurological status  - Initiate measures to prevent increased intracranial pressure  - Maintain blood pressure and fluid volume within ordered parameters to optimize cerebral perfusion and minimize risk of hemorrhage  - Monitor temperature, glucose, and sodium. Initiate appropriate interventions as ordered  Outcome: Progressing  Goal: Achieves maximal functionality and self care  Description: INTERVENTIONS  - Monitor swallowing and airway patency with patient fatigue and changes in neurological status  - Encourage and assist patient to increase activity and self care with guidance from PT/OT  - Encourage visually impaired, hearing impaired and aphasic patients to use assistive/communication devices  Outcome: Progressing   Patient still with right sided hemiparesis. Remains NPO until can be re-evaluated by speech. Given 1 unit of PRBC's and current Hgb-8. 2.

## 2022-05-17 NOTE — CONSULTS
[facility]    PATIENT'S NAME: Anjana SOLOMON   ATTENDING PHYSICIAN: Cecilio Sanchez MD   CONSULTING PHYSICIAN: Myrna Rizo. Levi Vasquez MD   PATIENT ACCOUNT#:   [id number]    LOCATION:  [address] [place] [address] RECORD #:   [id number]       YOB: 1935  ADMISSION DATE:       05/15/2022      CONSULT DATE:  05/16/2022    REPORT OF CONSULTATION    REFERRING PHYSICIAN:  Cecilio Sanchez MD     REASON FOR CONSULTATION:  Anemia, history of breast cancer. HISTORY OF PRESENT ILLNESS:  The patient is an 63-year-old female who previously followed with Dr. Carrol Cervantes for left-sided breast cancer. She is status post left mastectomy in 2003, for stage I (pT1c N0), estrogen receptor-positive breast cancer. She underwent a right mastectomy in 2004, with no evidence of malignancy at that time. The patient is hospitalized for cerebrovascular accident. Hematology/Oncology was consulted regarding her anemia. She had labs, 05/15/2022, where here hemoglobin was 7.3, white blood cell count 10,000, MCV was 106, platelets 600,675, neutrophil count 8000. Iron saturation, 04/08/2022, was 27%. Her creatinine is 1.59. The patient has been seen by Neurology in consultation for workup and management of cerebrovascular accident. She denies any bruising or bleeding. Denies any fevers or chills. Denies any adenopathy. Denies any current focal neurological deficits. She is otherwise without complaints. PAST MEDICAL HISTORY:  Breast cancer, osteoporosis, anemia, anxiety, history of herpes zoster, depression, hypertension, chronic kidney disease, peptic ulcer, polymyalgia rheumatica. PAST SURGICAL HISTORY:  History of breast surgery, as outlined above, hysterectomy. MEDICATIONS:  Aspirin, ferrous sulfate, escitalopram, Lotrimin, atorvastatin, metoprolol, prednisone. ALLERGIES:  No known drug allergies. SOCIAL HISTORY:  She denies any alcohol, tobacco, or illicit drug use.     FAMILY HISTORY:  There is a history of colon cancer in a cousin. No other history of hematologic disorders in the family. REVIEW OF SYSTEMS:  All other systems reviewed, negative x12. PHYSICAL EXAMINATION:    GENERAL:  No acute distress. Alert and oriented. VITAL SIGNS:  Temperature 37 degrees Celsius, pulse 20, respiratory rate 16, blood pressure is 115/58, pulse ox 94% on room air. HEENT:  Moist mucous membranes. Oropharynx clear. NECK:  Supple. LUNGS:  Symmetric expansion. HEART:  Good distal pulses. ABDOMEN:  Soft. EXTREMITIES:  No edema. SKIN:  No visible lesions. LYMPHATICS:  No visible adenopathy. NEUROLOGIC:  Moving all extremities. Cranial nerves intact. PSYCHIATRIC:  Appropriate mood, appropriate affect. MUSCULOSKELETAL:  No deformity. LABORATORY DATA:  Reviewed as per HPI. Please see above for details. ASSESSMENT AND PLAN:  The patient is a pleasant 24-year-old female with a remote history of stage I breast cancer being evaluated by Hematology/Oncology for anemia and borderline thrombocytopenia. She has macrocytic anemia. We will rule out hemolysis. We will check B12 levels. Given she also does have some chronic kidney disease, we will check a monoclonal protein study. She should receive transfusion if hemoglobin is less than 7. We will follow along, follow up on lab work, and arrange for outpatient followup. Thank you very much for this consultation request and allowing us to participate in the care of this delightful patient. Dictated By Ree Mendez MD  d: 05/16/2022 18:05:33  t: 05/16/2022 18:19:27  Job [id number]  VSA/    cc: Giuliana Simms MD

## 2022-05-17 NOTE — PLAN OF CARE
Problem: Patient Centered Care  Goal: Patient preferences are identified and integrated in the patient's plan of care  Description: Interventions:  - What would you like us to know as we care for you?   - Provide timely, complete, and accurate information to patient/family  - Incorporate patient and family knowledge, values, beliefs, and cultural backgrounds into the planning and delivery of care  - Encourage patient/family to participate in care and decision-making at the level they choose  - Honor patient and family perspectives and choices  Outcome: Not Progressing     Problem: Patient/Family Goals  Goal: Patient/Family Long Term Goal  Description: Patient's Long Term Goal:     Interventions:    - See additional Care Plan goals for specific interventions  Outcome: Not Progressing  Goal: Patient/Family Short Term Goal  Description: Patient's Short Term Goal:     Interventions:     - See additional Care Plan goals for specific interventions  Outcome: Not Progressing     Problem: NEUROLOGICAL - ADULT  Goal: Achieves stable or improved neurological status  Description: INTERVENTIONS  - Assess for and report changes in neurological status  - Initiate measures to prevent increased intracranial pressure  - Maintain blood pressure and fluid volume within ordered parameters to optimize cerebral perfusion and minimize risk of hemorrhage  - Monitor temperature, glucose, and sodium. Initiate appropriate interventions as ordered  Outcome: Not Progressing  Goal: Achieves maximal functionality and self care  Description: INTERVENTIONS  - Monitor swallowing and airway patency with patient fatigue and changes in neurological status  - Encourage and assist patient to increase activity and self care with guidance from PT/OT  - Encourage visually impaired, hearing impaired and aphasic patients to use assistive/communication devices  Outcome: Not Progressing   Patient had a palliative consult today. PT/OT recommeding acute rehab. Still remains NPO-speech to re-evaluate patient.

## 2022-05-17 NOTE — SLP NOTE
RN reported Pt with coughing on recommended diet of 1/2 tsp pureed and moderately thick liquids via tsp. Pt now NPO. Will f/u for re-assessment when appropriate.        Shawna Rizo M.S. CCC/SLP  Speech-Language Pathologist  [facility]  #[phone]

## 2022-05-17 NOTE — PLAN OF CARE
Vitals stable. Alert to self, on room air, sinus on tele and on bedrest. Patient continues to be NPO until seen by SLP. Neuro Q4hr and NIH daily. 0.9 at 75 ml/hr. Plan for PT/OT, SLP eval, and possible 2D echo. Problem: Patient Centered Care  Goal: Patient preferences are identified and integrated in the patient's plan of care  Description: Interventions:  - What would you like us to know as we care for you? My daughter is involved with my care  - Provide timely, complete, and accurate information to patient/family  - Incorporate patient and family knowledge, values, beliefs, and cultural backgrounds into the planning and delivery of care  - Encourage patient/family to participate in care and decision-making at the level they choose  - Honor patient and family perspectives and choices  Outcome: Progressing     Problem: Patient/Family Goals  Goal: Patient/Family Long Term Goal  Description: Patient's Long Term Goal: Go home    Interventions:  - Neuro consult  - See additional Care Plan goals for specific interventions  Outcome: Progressing  Goal: Patient/Family Short Term Goal  Description: Patient's Short Term Goal: To eat    Interventions:   - SLP eval  - IV fluids  - See additional Care Plan goals for specific interventions  Outcome: Progressing     Problem: NEUROLOGICAL - ADULT  Goal: Achieves stable or improved neurological status  Description: INTERVENTIONS  - Assess for and report changes in neurological status  - Initiate measures to prevent increased intracranial pressure  - Maintain blood pressure and fluid volume within ordered parameters to optimize cerebral perfusion and minimize risk of hemorrhage  - Monitor temperature, glucose, and sodium.  Initiate appropriate interventions as ordered  Outcome: Progressing  Goal: Achieves maximal functionality and self care  Description: INTERVENTIONS  - Monitor swallowing and airway patency with patient fatigue and changes in neurological status  - Encourage and assist patient to increase activity and self care with guidance from PT/OT  - Encourage visually impaired, hearing impaired and aphasic patients to use assistive/communication devices  Outcome: Progressing

## 2022-05-17 NOTE — SPIRITUAL CARE NOTE
The  initiated Pastoral Care visit. The Pt was sitting in the reclined asleep. The room was dim and quiet. The Pt's daughters were at bedside. The Pt was admitted for AMS. The  spoke with family regarding the request for a Noa Figueredo. The family stated that their parish Noa Figueredo from [id number] [facility] in [facility] will be visiting this evening. The  provided words of comfort and shared the Spiritual Department availability 24/7 when needed. No further follow up needed at this time.     Jimy Silva  Resident   Ext. [phone]

## 2022-05-18 ENCOUNTER — APPOINTMENT (OUTPATIENT)
Dept: CV DIAGNOSTICS | Facility: HOSPITAL | Age: 87
End: 2022-05-18
Attending: Other
Payer: MEDICARE

## 2022-05-18 ENCOUNTER — APPOINTMENT (OUTPATIENT)
Dept: GENERAL RADIOLOGY | Facility: HOSPITAL | Age: 87
End: 2022-05-18
Attending: Other
Payer: MEDICARE

## 2022-05-18 LAB
GLUCOSE BLDC GLUCOMTR-MCNC: 81 MG/DL (ref 70–99)
GLUCOSE BLDC GLUCOMTR-MCNC: 84 MG/DL (ref 70–99)
GLUCOSE BLDC GLUCOMTR-MCNC: 86 MG/DL (ref 70–99)
GLUCOSE BLDC GLUCOMTR-MCNC: 87 MG/DL (ref 70–99)

## 2022-05-18 PROCEDURE — 99232 SBSQ HOSP IP/OBS MODERATE 35: CPT | Performed by: INTERNAL MEDICINE

## 2022-05-18 PROCEDURE — 74230 X-RAY XM SWLNG FUNCJ C+: CPT | Performed by: OTHER

## 2022-05-18 PROCEDURE — 93306 TTE W/DOPPLER COMPLETE: CPT | Performed by: OTHER

## 2022-05-18 PROCEDURE — 99231 SBSQ HOSP IP/OBS SF/LOW 25: CPT | Performed by: NURSE PRACTITIONER

## 2022-05-18 PROCEDURE — 99497 ADVNCD CARE PLAN 30 MIN: CPT | Performed by: NURSE PRACTITIONER

## 2022-05-18 RX ORDER — TOBRAMYCIN 3 MG/ML
1 SOLUTION/ DROPS OPHTHALMIC EVERY 6 HOURS SCHEDULED
Status: DISCONTINUED | OUTPATIENT
Start: 2022-05-18 | End: 2022-05-20

## 2022-05-18 NOTE — PHYSICAL THERAPY NOTE
PHYSICAL THERAPY TREATMENT NOTE - INPATIENT     Room Number: 347/347-A       Presenting Problem: dysphagia, dysarthria, AMS, acute CVA    Problem List  Principal Problem:    Altered mental status, unspecified altered mental status type  Active Problems:    Altered mental status    ERMA (acute kidney injury) ([address]    Hyperkalemia    Cerebrovascular accident (CVA) due to embolism of left middle cerebral artery (HCC)    History of breast cancer    Goals of care, counseling/discussion    Advance care planning      PHYSICAL THERAPY ASSESSMENT   Chart reviewed. RN approved participation in physical therapy. PPE worn by therapist: mask, gloves and goggles. Patient was not wearing a mask during session. Patient presented in bedside chair with L knee pain. Patient moaning in pain upon sit to stand. Pt is a mod to max Ax2 for sit to/from stand transfer and unable to tolerate standing greater than 10 seconds due to pain. Pt with fair  progress towards goals during this session. Education provided on Physical therapy plan of care and physiological benefits of out of bed mobility. Patient with fair carryover. Bed mobility: NT  Transfers: Mod assist and x2    Pt unable make progress towards goals due to knee pain. Patient was left in bedside chair at end of session with all needs in reach. The patient's Approx Degree of Impairment: 76.75% has been calculated based on documentation in the [facility] '6 clicks' Inpatient Basic Mobility Short Form. Research supports that patients with this level of impairment may benefit from Subacute Rehab. RN aware of patient status post session.     DISCHARGE RECOMMENDATIONS  PT Discharge Recommendations: Sub-acute rehabilitation     PLAN  PT Treatment Plan: Bed mobility;Gait training;Transfer training    SUBJECTIVE  Pt is aphasic only able to moan & poin that she was having knee pain    OBJECTIVE  Precautions: Bed/chair alarm    WEIGHT BEARING RESTRICTION  Weight Bearing Restriction: None PAIN ASSESSMENT   Rating: Unable to rate  Location:  (rubbing Left knee)  Management Techniques: Activity promotion    BALANCE                                                                                                                       Static Sitting: Poor  Dynamic Sitting: Poor           Static Standing: Poor  Dynamic Standing: Poor -    AM-PAC '6-Clicks' INPATIENT SHORT FORM - BASIC MOBILITY  How much difficulty does the patient currently have. .. Patient Difficulty: Turning over in bed (including adjusting bedclothes, sheets and blankets)?: A Lot   Patient Difficulty: Sitting down on and standing up from a chair with arms (e.g., wheelchair, bedside commode, etc.): A Lot   Patient Difficulty: Moving from lying on back to sitting on the side of the bed?: A Lot   How much help from another person does the patient currently need. .. Help from Another: Moving to and from a bed to a chair (including a wheelchair)?: A Lot   Help from Another: Need to walk in hospital room?: Total   Help from Another: Climbing 3-5 steps with a railing?: Total     AM-PAC Score:  Raw Score: 10   Approx Degree of Impairment: 76.75%   Standardized Score (AM-PAC Scale): 32.29   CMS Modifier (G-Code): CL    THERAPEUTIC EXERCISES  Lower Extremity Ankle pumps  LAQ  10xea     Position Sitting & Standing       Patient End of Session: Up in chair;Needs met;Call light within reach;RN aware of session/findings; Family present    CURRENT GOALS   Goals to be met by: 5/21/22  Patient Goal Patient's self-stated goal is: dtr's goal for pt to strengthen RUE and walk again    Goal #1 Patient is able to demonstrate supine - sit EOB @ level: minimum assistance     Goal #1   Current Status NT   Goal #2 Patient is able to demonstrate transfers Sit to/from Stand at assistance level: minimum assistance with least restrictive assistive device (idalia walker vs large based quad cane vs small based quad cane)   Goal #2  Current Status Goal not met Goal #3 Patient is able to ambulate 25 feet with assist device: with least restrictive assistive device (idalia walker vs large based quad cane vs small based quad cane) at assistance level: minimum assistance   Goal #3   Current Status Goal Not met   Goal #4 Patient to demonstrate independence with home activity/exercise instructions provided to patient in preparation for discharge.    Goal #4   Current Status Goal no met, in progress   Goal #5    Goal #5   Current Status    Goal #6    Goal #6  Current Status

## 2022-05-18 NOTE — PLAN OF CARE
Patient without neuro changes. Passed video swallow. Denies pain. IV fluids continued. Plan is for rehab once medically ready. Patient and family updated on plan of care. Problem: Patient Centered Care  Goal: Patient preferences are identified and integrated in the patient's plan of care  Description: Interventions:  - What would you like us to know as we care for you? From [facility]  - Provide timely, complete, and accurate information to patient/family  - Incorporate patient and family knowledge, values, beliefs, and cultural backgrounds into the planning and delivery of care  - Encourage patient/family to participate in care and decision-making at the level they choose  - Honor patient and family perspectives and choices  Outcome: Progressing     Problem: Patient/Family Goals  Goal: Patient/Family Long Term Goal  Description: Patient's Long Term Goal: [facility]    Interventions:  - neuro checks  - monitor VS  -CT brain  - 2D echo  - See additional Care Plan goals for specific interventions  Outcome: Progressing  Goal: Patient/Family Short Term Goal  Description: Patient's Short Term Goal: resolve stroke    Interventions:   - neuro checks  - monitor VS  -CT brain  - 2D echo  - See additional Care Plan goals for specific interventions  Outcome: Progressing     Problem: NEUROLOGICAL - ADULT  Goal: Achieves stable or improved neurological status  Description: INTERVENTIONS  - Assess for and report changes in neurological status  - Initiate measures to prevent increased intracranial pressure  - Maintain blood pressure and fluid volume within ordered parameters to optimize cerebral perfusion and minimize risk of hemorrhage  - Monitor temperature, glucose, and sodium.  Initiate appropriate interventions as ordered  Outcome: Progressing  Goal: Achieves maximal functionality and self care  Description: INTERVENTIONS  - Monitor swallowing and airway patency with patient fatigue and changes in neurological status  - Encourage and assist patient to increase activity and self care with guidance from PT/OT  - Encourage visually impaired, hearing impaired and aphasic patients to use assistive/communication devices  Outcome: Progressing

## 2022-05-18 NOTE — HOME CARE LIAISON
This patient is current with [facility]. Patient will need a DARYA prior to DC for RN/PT.

## 2022-05-18 NOTE — PALLIATIVE CARE NOTE
5/20 Insurance verified. Patient to dc to [facility] for [address]. Residential OON with [facility]. VM left for daughter to ask how she would like to proceed. Waiting for call back. Residential Liaison received referral for community palliative care. Waiting to receive insurance verification before pursing.      Alex Squires  Palliative Liaison  [phone]

## 2022-05-18 NOTE — PLAN OF CARE
Pt alert and pleasantly confused with expressive aphagia, no changes to neuro status. Fluids running at 75ml/hr, blood sugars stable. Plan is for 2D echo and then [facility] when medically cleared. Fall precaution maintained, call light within reach, will continue to monitor    Problem: Patient Centered Care  Goal: Patient preferences are identified and integrated in the patient's plan of care  Description: Interventions:  - What would you like us to know as we care for you? From [facility]  - Provide timely, complete, and accurate information to patient/family  - Incorporate patient and family knowledge, values, beliefs, and cultural backgrounds into the planning and delivery of care  - Encourage patient/family to participate in care and decision-making at the level they choose  - Honor patient and family perspectives and choices  Outcome: Progressing     Problem: Patient/Family Goals  Goal: Patient/Family Long Term Goal  Description: Patient's Long Term Goal: [facility]    Interventions:  - neuro checks  - monitor VS  -CT brain  - 2D echo  - See additional Care Plan goals for specific interventions  Outcome: Progressing  Goal: Patient/Family Short Term Goal  Description: Patient's Short Term Goal: resolve stroke    Interventions:   - neuro checks  - monitor VS  -CT brain  - 2D echo  - See additional Care Plan goals for specific interventions  Outcome: Progressing     Problem: NEUROLOGICAL - ADULT  Goal: Achieves stable or improved neurological status  Description: INTERVENTIONS  - Assess for and report changes in neurological status  - Initiate measures to prevent increased intracranial pressure  - Maintain blood pressure and fluid volume within ordered parameters to optimize cerebral perfusion and minimize risk of hemorrhage  - Monitor temperature, glucose, and sodium.  Initiate appropriate interventions as ordered  Outcome: Progressing  Goal: Achieves maximal functionality and self care  Description: INTERVENTIONS  - Monitor swallowing and airway patency with patient fatigue and changes in neurological status  - Encourage and assist patient to increase activity and self care with guidance from PT/OT  - Encourage visually impaired, hearing impaired and aphasic patients to use assistive/communication devices  Outcome: Progressing

## 2022-05-18 NOTE — CM/SW NOTE
Daughter agrees with PT and OT's recommendation for NE. PMR referral cancelled. PLAN:  [address] for [place] when medically ready. / to remain available for support and/or discharge planning.      Lorene Bobby MBA BSN RN [address]  RN Case Manager  [phone]

## 2022-05-19 ENCOUNTER — APPOINTMENT (OUTPATIENT)
Dept: ULTRASOUND IMAGING | Facility: HOSPITAL | Age: 87
End: 2022-05-19
Attending: INTERNAL MEDICINE
Payer: MEDICARE

## 2022-05-19 LAB
GLUCOSE BLDC GLUCOMTR-MCNC: 94 MG/DL (ref 70–99)
GLUCOSE BLDC GLUCOMTR-MCNC: 98 MG/DL (ref 70–99)

## 2022-05-19 PROCEDURE — 99231 SBSQ HOSP IP/OBS SF/LOW 25: CPT | Performed by: NURSE PRACTITIONER

## 2022-05-19 PROCEDURE — 93970 EXTREMITY STUDY: CPT | Performed by: INTERNAL MEDICINE

## 2022-05-19 PROCEDURE — 99233 SBSQ HOSP IP/OBS HIGH 50: CPT | Performed by: OTHER

## 2022-05-19 PROCEDURE — 99232 SBSQ HOSP IP/OBS MODERATE 35: CPT | Performed by: INTERNAL MEDICINE

## 2022-05-19 RX ORDER — LISINOPRIL 5 MG/1
5 TABLET ORAL DAILY
Status: DISCONTINUED | OUTPATIENT
Start: 2022-05-19 | End: 2022-05-20

## 2022-05-19 RX ORDER — ATORVASTATIN CALCIUM 80 MG/1
80 TABLET, FILM COATED ORAL NIGHTLY
Qty: 90 TABLET | Refills: 1 | Status: SHIPPED | OUTPATIENT
Start: 2022-05-19

## 2022-05-19 RX ORDER — TOBRAMYCIN 3 MG/ML
1 SOLUTION/ DROPS OPHTHALMIC EVERY 6 HOURS SCHEDULED
Qty: 1 EACH | Refills: 0 | Status: SHIPPED | COMMUNITY
Start: 2022-05-20

## 2022-05-19 RX ORDER — LISINOPRIL 5 MG/1
5 TABLET ORAL DAILY
Qty: 30 TABLET | Refills: 0 | Status: SHIPPED | COMMUNITY
Start: 2022-05-20

## 2022-05-19 NOTE — PLAN OF CARE
Carmencita Thompson is alert and oriented x 1-2, confused. On RA. Purewick draining yellow urine. No pain complaints overnight. IVF continue per order. Plan to monitor oral intake. Discharge to [facility] once medically cleared. Problem: Patient Centered Care  Goal: Patient preferences are identified and integrated in the patient's plan of care  Description: Interventions:  - What would you like us to know as we care for you? From [facility]  - Provide timely, complete, and accurate information to patient/family  - Incorporate patient and family knowledge, values, beliefs, and cultural backgrounds into the planning and delivery of care  - Encourage patient/family to participate in care and decision-making at the level they choose  - Honor patient and family perspectives and choices  Outcome: Progressing     Problem: Patient/Family Goals  Goal: Patient/Family Long Term Goal  Description: Patient's Long Term Goal: go back to [facility]    Interventions:  - neuro checks  - monitor VS  -CT brain  - 2D echo  - See additional Care Plan goals for specific interventions  Outcome: Progressing  Goal: Patient/Family Short Term Goal  Description: Patient's Short Term Goal: resolve stroke    Interventions:   - neuro checks  - monitor VS  -CT brain  - 2D echo  - See additional Care Plan goals for specific interventions  Outcome: Progressing     Problem: NEUROLOGICAL - ADULT  Goal: Achieves stable or improved neurological status  Description: INTERVENTIONS  - Assess for and report changes in neurological status  - Initiate measures to prevent increased intracranial pressure  - Maintain blood pressure and fluid volume within ordered parameters to optimize cerebral perfusion and minimize risk of hemorrhage  - Monitor temperature, glucose, and sodium.  Initiate appropriate interventions as ordered  Outcome: Progressing  Goal: Achieves maximal functionality and self care  Description: INTERVENTIONS  - Monitor swallowing and airway patency with patient fatigue and changes in neurological status  - Encourage and assist patient to increase activity and self care with guidance from PT/OT  - Encourage visually impaired, hearing impaired and aphasic patients to use assistive/communication devices  Outcome: Progressing

## 2022-05-19 NOTE — SLP NOTE
SPEECH PATHOLOGY NOTE:    Attempted to see pt for f/u after objective VFSS completed on 5/18 and recommendations made for Puree foods with MILD thick liquids. Family member at bedside, but pt off the floor at a procedure and unable to participate in therapy. SLP will re-attempt therapy as pt and slp able and available.      Kate Ulrich MA, CCC-SLP  Speech and Language Pathologist

## 2022-05-19 NOTE — PLAN OF CARE
Problem: Patient Centered Care  Goal: Patient preferences are identified and integrated in the patient's plan of care  Description: Interventions:  - What would you like us to know as we care for you? From [facility]  - Provide timely, complete, and accurate information to patient/family  - Incorporate patient and family knowledge, values, beliefs, and cultural backgrounds into the planning and delivery of care  - Encourage patient/family to participate in care and decision-making at the level they choose  - Honor patient and family perspectives and choices  Outcome: Progressing     Problem: Patient/Family Goals  Goal: Patient/Family Long Term Goal  Description: Patient's Long Term Goal: [facility]    Interventions:  - neuro checks  - monitor VS  -CT brain  - 2D echo  - See additional Care Plan goals for specific interventions  Outcome: Progressing  Goal: Patient/Family Short Term Goal  Description: Patient's Short Term Goal: resolve stroke    Interventions:   - neuro checks  - monitor VS  -CT brain  - 2D echo  - See additional Care Plan goals for specific interventions  Outcome: Progressing     Problem: NEUROLOGICAL - ADULT  Goal: Achieves stable or improved neurological status  Description: INTERVENTIONS  - Assess for and report changes in neurological status  - Initiate measures to prevent increased intracranial pressure  - Maintain blood pressure and fluid volume within ordered parameters to optimize cerebral perfusion and minimize risk of hemorrhage  - Monitor temperature, glucose, and sodium.  Initiate appropriate interventions as ordered  Outcome: Progressing  Goal: Achieves maximal functionality and self care  Description: INTERVENTIONS  - Monitor swallowing and airway patency with patient fatigue and changes in neurological status  - Encourage and assist patient to increase activity and self care with guidance from PT/OT  - Encourage visually impaired, hearing impaired and aphasic patients to use assistive/communication devices  Outcome: Progressing    Patient is alert and orientated x 1-2. IVF continued per order. Neuro checks Q shift. Plan to discharge to [facility] once medically cleared.

## 2022-05-20 ENCOUNTER — APPOINTMENT (OUTPATIENT)
Dept: HEMATOLOGY/ONCOLOGY | Facility: HOSPITAL | Age: 87
End: 2022-05-20
Attending: INTERNAL MEDICINE
Payer: MEDICARE

## 2022-05-20 VITALS
HEART RATE: 87 BPM | OXYGEN SATURATION: 98 % | RESPIRATION RATE: 18 BRPM | SYSTOLIC BLOOD PRESSURE: 153 MMHG | WEIGHT: 128.63 LBS | TEMPERATURE: 97 F | DIASTOLIC BLOOD PRESSURE: 72 MMHG | BODY MASS INDEX: 24 KG/M2

## 2022-05-20 PROBLEM — I63.512 ACUTE ISCHEMIC LEFT MCA STROKE (HCC): Status: ACTIVE | Noted: 2022-05-16

## 2022-05-20 LAB
ALBUMIN SERPL ELPH-MCNC: 2.8 G/DL (ref 3.75–5.21)
ALBUMIN/GLOB SERPL: 1.27 {RATIO} (ref 1–2)
ALPHA1 GLOB SERPL ELPH-MCNC: 0.31 G/DL (ref 0.19–0.46)
ALPHA2 GLOB SERPL ELPH-MCNC: 0.75 G/DL (ref 0.48–1.05)
B-GLOBULIN SERPL ELPH-MCNC: 0.58 G/DL (ref 0.68–1.23)
GAMMA GLOB SERPL ELPH-MCNC: 0.57 G/DL (ref 0.62–1.7)
KAPPA LC FREE SER-MCNC: 3.68 MG/DL (ref 0.33–1.94)
KAPPA LC FREE/LAMBDA FREE SER NEPH: 1.3 {RATIO} (ref 0.26–1.65)
LAMBDA LC FREE SERPL-MCNC: 2.82 MG/DL (ref 0.57–2.63)
PROT SERPL-MCNC: 5 G/DL (ref 6.4–8.2)
SARS-COV-2 RNA RESP QL NAA+PROBE: NOT DETECTED

## 2022-05-20 RX ORDER — ASPIRIN 81 MG/1
81 TABLET, CHEWABLE ORAL DAILY
Qty: 90 TABLET | Refills: 3 | Status: SHIPPED | OUTPATIENT
Start: 2022-05-21 | End: 2023-05-21

## 2022-05-20 RX ORDER — ASPIRIN 81 MG/1
81 TABLET, CHEWABLE ORAL DAILY
Status: DISCONTINUED | OUTPATIENT
Start: 2022-05-20 | End: 2022-05-20

## 2022-05-20 NOTE — PLAN OF CARE
Dominik Monreal is alert and oriented 2-3, confused. On RA. No pain complaints overnight. Purewick draining yellow urine. IVF running per order. Plan to discharge to [facility] once medically cleared. Problem: Patient Centered Care  Goal: Patient preferences are identified and integrated in the patient's plan of care  Description: Interventions:  - What would you like us to know as we care for you? From [facility]  - Provide timely, complete, and accurate information to patient/family  - Incorporate patient and family knowledge, values, beliefs, and cultural backgrounds into the planning and delivery of care  - Encourage patient/family to participate in care and decision-making at the level they choose  - Honor patient and family perspectives and choices  Outcome: Progressing     Problem: Patient/Family Goals  Goal: Patient/Family Long Term Goal  Description: Patient's Long Term Goal: [facility]    Interventions:  - neuro checks  - monitor VS  -CT brain  - 2D echo  - See additional Care Plan goals for specific interventions  Outcome: Progressing  Goal: Patient/Family Short Term Goal  Description: Patient's Short Term Goal: resolve stroke    Interventions:   - neuro checks  - monitor VS  -CT brain  - 2D echo  - See additional Care Plan goals for specific interventions  Outcome: Progressing     Problem: NEUROLOGICAL - ADULT  Goal: Achieves stable or improved neurological status  Description: INTERVENTIONS  - Assess for and report changes in neurological status  - Initiate measures to prevent increased intracranial pressure  - Maintain blood pressure and fluid volume within ordered parameters to optimize cerebral perfusion and minimize risk of hemorrhage  - Monitor temperature, glucose, and sodium.  Initiate appropriate interventions as ordered  Outcome: Progressing  Goal: Achieves maximal functionality and self care  Description: INTERVENTIONS  - Monitor swallowing and airway patency with patient fatigue and changes in neurological status  - Encourage and assist patient to increase activity and self care with guidance from PT/OT  - Encourage visually impaired, hearing impaired and aphasic patients to use assistive/communication devices  Outcome: Progressing

## 2022-05-20 NOTE — CM/SW NOTE
05/20/22 1029   Discharge disposition   Expected discharge disposition [facility] Provider [facility]   Additional Home Care/Hospice Provider [facility]   Discharge transportation Private car       Ambulance to be here at 1115. Report is [phone]. RN is aware and will notify family.     Kristin Garcia MBA BSN RN [address]  RN Case Manager  [phone]

## 2022-05-20 NOTE — PLAN OF CARE
Pt A/Ox2-3, RA, denies pain and shortness of breath. Pt to be discharged [facility] today as ordered. Discharge instructions, medications/side effects, prescriptions, and follow up appointments reviewed with receiving RN Martha Newton. Daughters Mitchell Albright and Marco Antonio Santiago notified bedside of plan for discharge and are agreeable. Tele removed. IV site DC, site CDI. Belongings gathered and sent with pt. Problem: Patient Centered Care  Goal: Patient preferences are identified and integrated in the patient's plan of care  Description: Interventions:  - What would you like us to know as we care for you? From [facility]  - Provide timely, complete, and accurate information to patient/family  - Incorporate patient and family knowledge, values, beliefs, and cultural backgrounds into the planning and delivery of care  - Encourage patient/family to participate in care and decision-making at the level they choose  - Honor patient and family perspectives and choices  Outcome: Progressing     Problem: Patient/Family Goals  Goal: Patient/Family Long Term Goal  Description: Patient's Long Term Goal: [facility]    Interventions:  - neuro checks  - monitor VS  -CT brain  - 2D echo  - See additional Care Plan goals for specific interventions  Outcome: Progressing  Goal: Patient/Family Short Term Goal  Description: Patient's Short Term Goal: resolve stroke    Interventions:   - neuro checks  - monitor VS  -CT brain  - 2D echo  - See additional Care Plan goals for specific interventions  Outcome: Progressing     Problem: NEUROLOGICAL - ADULT  Goal: Achieves stable or improved neurological status  Description: INTERVENTIONS  - Assess for and report changes in neurological status  - Initiate measures to prevent increased intracranial pressure  - Maintain blood pressure and fluid volume within ordered parameters to optimize cerebral perfusion and minimize risk of hemorrhage  - Monitor temperature, glucose, and sodium.  Initiate appropriate interventions as ordered  Outcome: Progressing  Goal: Achieves maximal functionality and self care  Description: INTERVENTIONS  - Monitor swallowing and airway patency with patient fatigue and changes in neurological status  - Encourage and assist patient to increase activity and self care with guidance from PT/OT  - Encourage visually impaired, hearing impaired and aphasic patients to use assistive/communication devices  Outcome: Progressing

## 2022-05-23 ENCOUNTER — TELEPHONE (OUTPATIENT)
Dept: HEMATOLOGY/ONCOLOGY | Facility: HOSPITAL | Age: 87
End: 2022-05-23

## 2022-05-23 NOTE — TELEPHONE ENCOUNTER
Call placed to daughter Megha Batista re f/u appt with devin Mendez and possible aranesp. Patient was discharged last week to [facility] and is receiving PT/OT. Megha Batista does not feel she is safe to transport via wheelchair at this time. Hoping for some increased improvement so that she can come via [id number] in wheelchair for appt. May need a little more time. I suggested I call back in one week to see how she is progressing with therapy. Megha Batista concurs with plan.

## 2022-06-01 ENCOUNTER — APPOINTMENT (OUTPATIENT)
Dept: HEMATOLOGY/ONCOLOGY | Facility: HOSPITAL | Age: 87
End: 2022-06-01
Attending: INTERNAL MEDICINE
Payer: MEDICARE

## 2022-06-02 ENCOUNTER — SNF VISIT (OUTPATIENT)
Dept: INTERNAL MEDICINE CLINIC | Facility: SKILLED NURSING FACILITY | Age: 87
End: 2022-06-02

## 2022-06-02 DIAGNOSIS — I63.512 ACUTE ISCHEMIC LEFT MCA STROKE (HCC): ICD-10-CM

## 2022-06-02 DIAGNOSIS — R79.89 ELEVATED LFTS: ICD-10-CM

## 2022-06-02 DIAGNOSIS — I63.9 CEREBROVASCULAR ACCIDENT (CVA), UNSPECIFIED MECHANISM (HCC): ICD-10-CM

## 2022-06-02 PROCEDURE — 99309 SBSQ NF CARE MODERATE MDM 30: CPT | Performed by: NURSE PRACTITIONER

## 2022-06-02 PROCEDURE — 1111F DSCHRG MED/CURRENT MED MERGE: CPT | Performed by: NURSE PRACTITIONER

## 2022-06-13 ENCOUNTER — SNF VISIT (OUTPATIENT)
Dept: INTERNAL MEDICINE CLINIC | Facility: SKILLED NURSING FACILITY | Age: 87
End: 2022-06-13

## 2022-06-13 DIAGNOSIS — R79.89 ELEVATED LFTS: ICD-10-CM

## 2022-06-13 DIAGNOSIS — R53.1 WEAKNESS: ICD-10-CM

## 2022-06-13 DIAGNOSIS — I63.512 ACUTE ISCHEMIC LEFT MCA STROKE (HCC): ICD-10-CM

## 2022-06-14 ENCOUNTER — TELEPHONE (OUTPATIENT)
Dept: HEMATOLOGY/ONCOLOGY | Facility: HOSPITAL | Age: 87
End: 2022-06-14

## 2022-06-14 RX ORDER — SODIUM CHLORIDE 9 MG/ML
10 INJECTION INTRAVENOUS ONCE
OUTPATIENT
Start: 2022-06-15

## 2022-06-14 RX ORDER — HEPARIN SODIUM (PORCINE) LOCK FLUSH IV SOLN 100 UNIT/ML 100 UNIT/ML
5 SOLUTION INTRAVENOUS ONCE
OUTPATIENT
Start: 2022-06-15

## 2022-06-17 ENCOUNTER — NURSE ONLY (OUTPATIENT)
Dept: HEMATOLOGY/ONCOLOGY | Facility: HOSPITAL | Age: 87
End: 2022-06-17
Attending: INTERNAL MEDICINE
Payer: MEDICARE

## 2022-06-17 VITALS
SYSTOLIC BLOOD PRESSURE: 135 MMHG | TEMPERATURE: 98 F | RESPIRATION RATE: 18 BRPM | HEART RATE: 100 BPM | OXYGEN SATURATION: 99 % | DIASTOLIC BLOOD PRESSURE: 48 MMHG

## 2022-06-17 DIAGNOSIS — Z45.2 ENCOUNTER FOR CARE RELATED TO PORT-A-CATH: ICD-10-CM

## 2022-06-17 DIAGNOSIS — N18.32 STAGE 3B CHRONIC KIDNEY DISEASE (HCC): Primary | ICD-10-CM

## 2022-06-17 DIAGNOSIS — Z95.828 PORT-A-CATH IN PLACE: ICD-10-CM

## 2022-06-17 DIAGNOSIS — N18.9 CHRONIC KIDNEY DISEASE, UNSPECIFIED CKD STAGE: ICD-10-CM

## 2022-06-17 DIAGNOSIS — Z85.3 HISTORY OF BREAST CANCER: ICD-10-CM

## 2022-06-17 DIAGNOSIS — D64.9 ANEMIA, UNSPECIFIED TYPE: ICD-10-CM

## 2022-06-17 DIAGNOSIS — D64.9 ANEMIA, UNSPECIFIED TYPE: Primary | ICD-10-CM

## 2022-06-17 PROCEDURE — 96372 THER/PROPH/DIAG INJ SC/IM: CPT

## 2022-06-17 PROCEDURE — 99214 OFFICE O/P EST MOD 30 MIN: CPT | Performed by: INTERNAL MEDICINE

## 2022-06-17 RX ORDER — SODIUM CHLORIDE 9 MG/ML
10 INJECTION INTRAVENOUS ONCE
OUTPATIENT
Start: 2022-07-08

## 2022-06-17 RX ORDER — POLYETHYLENE GLYCOL 3350 17 G/17G
17 POWDER, FOR SOLUTION ORAL DAILY
COMMUNITY

## 2022-06-17 RX ORDER — METHYLPREDNISOLONE 4 MG/1
4 TABLET ORAL DAILY
COMMUNITY

## 2022-06-17 RX ORDER — ACETAMINOPHEN 325 MG/1
325 TABLET ORAL EVERY 6 HOURS PRN
COMMUNITY

## 2022-06-17 RX ORDER — ASCORBIC ACID 500 MG
500 TABLET ORAL DAILY
COMMUNITY

## 2022-06-17 RX ORDER — HEPARIN SODIUM (PORCINE) LOCK FLUSH IV SOLN 100 UNIT/ML 100 UNIT/ML
5 SOLUTION INTRAVENOUS ONCE
OUTPATIENT
Start: 2022-07-08

## 2022-06-17 RX ORDER — HYDRALAZINE HYDROCHLORIDE 25 MG/1
25 TABLET, FILM COATED ORAL AS NEEDED
COMMUNITY

## 2022-06-17 RX ORDER — IBUPROFEN 400 MG/1
400 TABLET ORAL EVERY 6 HOURS PRN
COMMUNITY

## 2022-06-17 RX ORDER — DOCUSATE SODIUM 100 MG/1
100 CAPSULE, LIQUID FILLED ORAL 2 TIMES DAILY
COMMUNITY

## 2022-06-17 NOTE — PROGRESS NOTES
Pt here for first aranesp injection for chronic kidney disease per Dr Eric Harris  See his most recent notes  Daughter Cecilio Noguera w/ pt  Pt with recent stroke affecting her right side, has slower speech - can find some words  Hgl 6/11 = 8.1  Dr Eric Harris will see pt for next injection (cbc might be done by home health prior) and a visit in 6 weeks  Might change back to 3 weeks, depending on results. Pt and daughter aware. Provided and reviewed [facility] handout for aranesp - name, purpose, freq, route, pot SE's, when to call MD  They voiced understanding    Aranesp 500mcg given subcutaneous left upper abd, tolerated well. 2x2 gauze/ paper tape to site  (bp lower left arm, right arm affected by stroke. They preferred abd sites)    Discharged stable to [facility], pt in [address] [place] [address], with her daughter.

## 2022-06-20 ENCOUNTER — SNF VISIT (OUTPATIENT)
Dept: INTERNAL MEDICINE CLINIC | Facility: SKILLED NURSING FACILITY | Age: 87
End: 2022-06-20

## 2022-06-20 DIAGNOSIS — I10 PRIMARY HYPERTENSION: ICD-10-CM

## 2022-06-20 DIAGNOSIS — I63.512 ACUTE ISCHEMIC LEFT MCA STROKE (HCC): ICD-10-CM

## 2022-06-20 DIAGNOSIS — R53.1 WEAKNESS: ICD-10-CM

## 2022-06-21 ENCOUNTER — TELEPHONE (OUTPATIENT)
Dept: NEUROLOGY | Facility: CLINIC | Age: 87
End: 2022-06-21

## 2022-06-21 ENCOUNTER — APPOINTMENT (OUTPATIENT)
Dept: CT IMAGING | Facility: HOSPITAL | Age: 87
End: 2022-06-21
Attending: EMERGENCY MEDICINE
Payer: MEDICARE

## 2022-06-21 ENCOUNTER — APPOINTMENT (OUTPATIENT)
Dept: GENERAL RADIOLOGY | Facility: HOSPITAL | Age: 87
End: 2022-06-21
Attending: EMERGENCY MEDICINE
Payer: MEDICARE

## 2022-06-21 ENCOUNTER — HOSPITAL ENCOUNTER (OUTPATIENT)
Facility: HOSPITAL | Age: 87
Setting detail: OBSERVATION
LOS: 1 days | Discharge: NURSING, REHAB OR LONG-TERM CARE | End: 2022-06-22
Attending: EMERGENCY MEDICINE | Admitting: INTERNAL MEDICINE
Payer: MEDICARE

## 2022-06-21 DIAGNOSIS — M79.2 NEUROGENIC PAIN DUE TO CENTRAL NERVOUS SYSTEM ABNORMALITY FOLLOWING STROKE: ICD-10-CM

## 2022-06-21 DIAGNOSIS — N39.0 URINARY TRACT INFECTION WITHOUT HEMATURIA, SITE UNSPECIFIED: Primary | ICD-10-CM

## 2022-06-21 DIAGNOSIS — I69.398 NEUROGENIC PAIN DUE TO CENTRAL NERVOUS SYSTEM ABNORMALITY FOLLOWING STROKE: ICD-10-CM

## 2022-06-21 DIAGNOSIS — E86.0 DEHYDRATION: ICD-10-CM

## 2022-06-21 LAB
ANION GAP SERPL CALC-SCNC: 6 MMOL/L (ref 0–18)
BASOPHILS # BLD AUTO: 0.03 X10(3) UL (ref 0–0.2)
BASOPHILS NFR BLD AUTO: 0.2 %
BILIRUB UR QL: NEGATIVE
BUN BLD-MCNC: 83 MG/DL (ref 7–18)
BUN/CREAT SERPL: 65.4 (ref 10–20)
CALCIUM BLD-MCNC: 9 MG/DL (ref 8.5–10.1)
CHLORIDE SERPL-SCNC: 113 MMOL/L (ref 98–112)
CLARITY UR: CLEAR
CO2 SERPL-SCNC: 27 MMOL/L (ref 21–32)
COLOR UR: YELLOW
CREAT BLD-MCNC: 1.27 MG/DL
DEPRECATED RDW RBC AUTO: 63.5 FL (ref 35.1–46.3)
EOSINOPHIL # BLD AUTO: 0.1 X10(3) UL (ref 0–0.7)
EOSINOPHIL NFR BLD AUTO: 0.7 %
ERYTHROCYTE [DISTWIDTH] IN BLOOD BY AUTOMATED COUNT: 17.2 % (ref 11–15)
GLUCOSE BLD-MCNC: 158 MG/DL (ref 70–99)
GLUCOSE UR-MCNC: NEGATIVE MG/DL
HCT VFR BLD AUTO: 30.1 %
HGB BLD-MCNC: 8.8 G/DL
HYALINE CASTS #/AREA URNS AUTO: PRESENT /LPF
IMM GRANULOCYTES # BLD AUTO: 0.28 X10(3) UL (ref 0–1)
IMM GRANULOCYTES NFR BLD: 2 %
KETONES UR-MCNC: NEGATIVE MG/DL
LYMPHOCYTES # BLD AUTO: 0.62 X10(3) UL (ref 1–4)
LYMPHOCYTES NFR BLD AUTO: 4.5 %
MCH RBC QN AUTO: 30.8 PG (ref 26–34)
MCHC RBC AUTO-ENTMCNC: 29.2 G/DL (ref 31–37)
MCV RBC AUTO: 105.2 FL
MONOCYTES # BLD AUTO: 0.88 X10(3) UL (ref 0.1–1)
MONOCYTES NFR BLD AUTO: 6.4 %
NEUTROPHILS # BLD AUTO: 11.85 X10 (3) UL (ref 1.5–7.7)
NEUTROPHILS # BLD AUTO: 11.85 X10(3) UL (ref 1.5–7.7)
NEUTROPHILS NFR BLD AUTO: 86.2 %
NITRITE UR QL STRIP.AUTO: NEGATIVE
OSMOLALITY SERPL CALC.SUM OF ELEC: 330 MOSM/KG (ref 275–295)
PH UR: 6.5 [PH] (ref 5–8)
PLATELET # BLD AUTO: 176 10(3)UL (ref 150–450)
POTASSIUM SERPL-SCNC: 5.5 MMOL/L (ref 3.5–5.1)
RBC # BLD AUTO: 2.86 X10(6)UL
RBC #/AREA URNS AUTO: >10 /HPF
SARS-COV-2 RNA RESP QL NAA+PROBE: NOT DETECTED
SODIUM SERPL-SCNC: 146 MMOL/L (ref 136–145)
SP GR UR STRIP: 1.01 (ref 1–1.03)
UROBILINOGEN UR STRIP-ACNC: 0.2
VIT B12 SERPL-MCNC: 843 PG/ML (ref 193–986)
WBC # BLD AUTO: 13.8 X10(3) UL (ref 4–11)

## 2022-06-21 PROCEDURE — 71045 X-RAY EXAM CHEST 1 VIEW: CPT | Performed by: EMERGENCY MEDICINE

## 2022-06-21 PROCEDURE — 99214 OFFICE O/P EST MOD 30 MIN: CPT | Performed by: OTHER

## 2022-06-21 PROCEDURE — 70450 CT HEAD/BRAIN W/O DYE: CPT | Performed by: EMERGENCY MEDICINE

## 2022-06-21 RX ORDER — MELATONIN
1000 DAILY
Status: DISCONTINUED | OUTPATIENT
Start: 2022-06-22 | End: 2022-06-22

## 2022-06-21 RX ORDER — POLYETHYLENE GLYCOL 3350 17 G/17G
17 POWDER, FOR SOLUTION ORAL DAILY PRN
Status: DISCONTINUED | OUTPATIENT
Start: 2022-06-21 | End: 2022-06-22

## 2022-06-21 RX ORDER — ATORVASTATIN CALCIUM 80 MG/1
80 TABLET, FILM COATED ORAL NIGHTLY
Status: DISCONTINUED | OUTPATIENT
Start: 2022-06-21 | End: 2022-06-22

## 2022-06-21 RX ORDER — MELATONIN
325
Status: DISCONTINUED | OUTPATIENT
Start: 2022-06-22 | End: 2022-06-22

## 2022-06-21 RX ORDER — HYDRALAZINE HYDROCHLORIDE 25 MG/1
25 TABLET, FILM COATED ORAL EVERY 4 HOURS PRN
Status: DISCONTINUED | OUTPATIENT
Start: 2022-06-21 | End: 2022-06-22

## 2022-06-21 RX ORDER — ESCITALOPRAM OXALATE 10 MG/1
10 TABLET ORAL DAILY
Refills: 5 | Status: DISCONTINUED | OUTPATIENT
Start: 2022-06-22 | End: 2022-06-22

## 2022-06-21 RX ORDER — SODIUM CHLORIDE 9 MG/ML
100 INJECTION, SOLUTION INTRAVENOUS CONTINUOUS
Status: DISCONTINUED | OUTPATIENT
Start: 2022-06-21 | End: 2022-06-22

## 2022-06-21 RX ORDER — DOCUSATE SODIUM 100 MG/1
100 CAPSULE, LIQUID FILLED ORAL 2 TIMES DAILY PRN
Status: DISCONTINUED | OUTPATIENT
Start: 2022-06-21 | End: 2022-06-22

## 2022-06-21 RX ORDER — PREDNISONE 1 MG/1
5 TABLET ORAL DAILY
Status: DISCONTINUED | OUTPATIENT
Start: 2022-06-22 | End: 2022-06-22

## 2022-06-21 RX ORDER — ASCORBIC ACID 500 MG
500 TABLET ORAL DAILY
Status: DISCONTINUED | OUTPATIENT
Start: 2022-06-22 | End: 2022-06-22

## 2022-06-21 RX ORDER — ASPIRIN 81 MG/1
81 TABLET, CHEWABLE ORAL DAILY
Status: DISCONTINUED | OUTPATIENT
Start: 2022-06-22 | End: 2022-06-22

## 2022-06-21 RX ORDER — FAMOTIDINE 10 MG
10 TABLET ORAL DAILY
Status: DISCONTINUED | OUTPATIENT
Start: 2022-06-22 | End: 2022-06-22

## 2022-06-21 RX ORDER — TOBRAMYCIN 3 MG/ML
1 SOLUTION/ DROPS OPHTHALMIC EVERY 6 HOURS SCHEDULED
Status: DISCONTINUED | OUTPATIENT
Start: 2022-06-22 | End: 2022-06-22

## 2022-06-21 RX ORDER — ACETAMINOPHEN 325 MG/1
325 TABLET ORAL EVERY 6 HOURS PRN
Status: DISCONTINUED | OUTPATIENT
Start: 2022-06-21 | End: 2022-06-22

## 2022-06-21 NOTE — ED INITIAL ASSESSMENT (HPI)
Pt presents to the ER via EMS for HA. Pt has h/o stroke with right sided weakness. Per family pt has been c/o HA and pain to right leg and arm.

## 2022-06-21 NOTE — TELEPHONE ENCOUNTER
I spoke with the patients daughter, and she stated that she is taking her mother to the ER. She has called the ambulance as the patient has been expressing R sided facial and arm pain, severe headache, and her BP is currently 213/120. Cecilio Noguera stated the patient has been taking her medications as directed, but she thinks her mother may be having another stroke. I informed Cecilio Noguera that Dr. Miguel Angel Collier is on call and he will probably see her in the ER/hospital.  Cecilio Noguera verbalized understanding. Message to Dr. Miguel Angel Collier as Maykel Segura. Reviewed and electronically signed by:  [address], [facility], [facility]

## 2022-06-21 NOTE — TELEPHONE ENCOUNTER
Pt shauna call requesting a sooner apt to see . Pt has been having headaches , nerve pain and her blood pressure is kate and not stable over a week. Pt was seen in the hospital by Elfego Dodson 5/15/22. Next visit 8/17/22.   Please advise

## 2022-06-22 ENCOUNTER — INITIAL APN SNF VISIT (OUTPATIENT)
Dept: INTERNAL MEDICINE CLINIC | Facility: SKILLED NURSING FACILITY | Age: 87
End: 2022-06-22

## 2022-06-22 VITALS
WEIGHT: 130 LBS | HEIGHT: 62 IN | OXYGEN SATURATION: 98 % | TEMPERATURE: 98 F | SYSTOLIC BLOOD PRESSURE: 128 MMHG | HEART RATE: 79 BPM | BODY MASS INDEX: 23.92 KG/M2 | DIASTOLIC BLOOD PRESSURE: 62 MMHG | RESPIRATION RATE: 16 BRPM

## 2022-06-22 DIAGNOSIS — I10 PRIMARY HYPERTENSION: ICD-10-CM

## 2022-06-22 DIAGNOSIS — D64.9 ANEMIA, UNSPECIFIED TYPE: ICD-10-CM

## 2022-06-22 DIAGNOSIS — I63.512 ACUTE ISCHEMIC LEFT MCA STROKE (HCC): ICD-10-CM

## 2022-06-22 DIAGNOSIS — N17.9 AKI (ACUTE KIDNEY INJURY) (HCC): ICD-10-CM

## 2022-06-22 LAB
ANION GAP SERPL CALC-SCNC: 7 MMOL/L (ref 0–18)
BASOPHILS # BLD AUTO: 0.04 X10(3) UL (ref 0–0.2)
BASOPHILS NFR BLD AUTO: 0.3 %
BUN BLD-MCNC: 60 MG/DL (ref 7–18)
BUN/CREAT SERPL: 67.4 (ref 10–20)
CALCIUM BLD-MCNC: 8.6 MG/DL (ref 8.5–10.1)
CHLORIDE SERPL-SCNC: 116 MMOL/L (ref 98–112)
CO2 SERPL-SCNC: 25 MMOL/L (ref 21–32)
CREAT BLD-MCNC: 0.89 MG/DL
DEPRECATED RDW RBC AUTO: 62.5 FL (ref 35.1–46.3)
EOSINOPHIL # BLD AUTO: 0.3 X10(3) UL (ref 0–0.7)
EOSINOPHIL NFR BLD AUTO: 2.6 %
ERYTHROCYTE [DISTWIDTH] IN BLOOD BY AUTOMATED COUNT: 17.2 % (ref 11–15)
GLUCOSE BLD-MCNC: 94 MG/DL (ref 70–99)
HCT VFR BLD AUTO: 26.4 %
HGB BLD-MCNC: 7.8 G/DL
IMM GRANULOCYTES # BLD AUTO: 0.2 X10(3) UL (ref 0–1)
IMM GRANULOCYTES NFR BLD: 1.7 %
LYMPHOCYTES # BLD AUTO: 1.19 X10(3) UL (ref 1–4)
LYMPHOCYTES NFR BLD AUTO: 10.3 %
MCH RBC QN AUTO: 31.2 PG (ref 26–34)
MCHC RBC AUTO-ENTMCNC: 29.5 G/DL (ref 31–37)
MCV RBC AUTO: 105.6 FL
MONOCYTES # BLD AUTO: 0.99 X10(3) UL (ref 0.1–1)
MONOCYTES NFR BLD AUTO: 8.6 %
NEUTROPHILS # BLD AUTO: 8.85 X10 (3) UL (ref 1.5–7.7)
NEUTROPHILS # BLD AUTO: 8.85 X10(3) UL (ref 1.5–7.7)
NEUTROPHILS NFR BLD AUTO: 76.5 %
OSMOLALITY SERPL CALC.SUM OF ELEC: 323 MOSM/KG (ref 275–295)
PLATELET # BLD AUTO: 155 10(3)UL (ref 150–450)
POTASSIUM SERPL-SCNC: 3.8 MMOL/L (ref 3.5–5.1)
RBC # BLD AUTO: 2.5 X10(6)UL
SODIUM SERPL-SCNC: 148 MMOL/L (ref 136–145)
WBC # BLD AUTO: 11.6 X10(3) UL (ref 4–11)

## 2022-06-22 PROCEDURE — 99310 SBSQ NF CARE HIGH MDM 45: CPT | Performed by: NURSE PRACTITIONER

## 2022-06-22 PROCEDURE — 99214 OFFICE O/P EST MOD 30 MIN: CPT | Performed by: OTHER

## 2022-06-22 RX ORDER — GABAPENTIN 100 MG/1
100 CAPSULE ORAL 3 TIMES DAILY
Status: DISCONTINUED | OUTPATIENT
Start: 2022-06-22 | End: 2022-06-22

## 2022-06-22 RX ORDER — GABAPENTIN 100 MG/1
100 CAPSULE ORAL 3 TIMES DAILY
Qty: 90 CAPSULE | Refills: 0 | Status: SHIPPED | OUTPATIENT
Start: 2022-06-22 | End: 2022-06-22

## 2022-06-22 RX ORDER — OXCARBAZEPINE 150 MG/1
150 TABLET, FILM COATED ORAL 2 TIMES DAILY
Qty: 180 TABLET | Refills: 0 | Status: SHIPPED | OUTPATIENT
Start: 2022-06-22 | End: 2022-09-20

## 2022-06-22 RX ORDER — VANCOMYCIN HYDROCHLORIDE 125 MG/1
125 CAPSULE ORAL DAILY
Status: DISCONTINUED | OUTPATIENT
Start: 2022-06-22 | End: 2022-06-22

## 2022-06-22 RX ORDER — SODIUM CHLORIDE 450 MG/100ML
INJECTION, SOLUTION INTRAVENOUS CONTINUOUS
Status: DISCONTINUED | OUTPATIENT
Start: 2022-06-22 | End: 2022-06-22

## 2022-06-22 RX ORDER — HYDRALAZINE HYDROCHLORIDE 25 MG/1
25 TABLET, FILM COATED ORAL 3 TIMES DAILY
Qty: 90 TABLET | Refills: 0 | Status: SHIPPED | OUTPATIENT
Start: 2022-06-22

## 2022-06-22 RX ORDER — OXCARBAZEPINE 150 MG/1
150 TABLET, FILM COATED ORAL 2 TIMES DAILY
Status: DISCONTINUED | OUTPATIENT
Start: 2022-06-22 | End: 2022-06-22

## 2022-06-22 RX ORDER — LISINOPRIL 20 MG/1
20 TABLET ORAL DAILY
Qty: 30 TABLET | Refills: 0 | Status: SHIPPED | OUTPATIENT
Start: 2022-06-22

## 2022-06-22 NOTE — CM/SW NOTE
Pt admitted from [facility]. CM was notified by RN that pt is medically cleared for dc, no abx, no PT eval needed. OK to dc today back to [facility]. CM sent ref and lvm for liaison re above. Await call back to coordinate. Plan  Return to [address] [place] [address] done  RN report [phone]    RN aware and family is at bedside. / to remain available for support and/or discharge planning.      Josette Rose RN    Ext [phone]

## 2022-06-22 NOTE — PROGRESS NOTES
Discharge RN Summary: Patient has discharge order in. Patient to discharge to [facility]. IV removed by this RN. Understands to follow up with PCP in 1 week. Multiple family members at bedside updated on plan of care. Copy of DC instructions given to family. PCS and dc packet prepared for [facility]. Both hearings aids worn by patient prior to transfer via [employer]. Scripts sent with pt: Lisinopril / Hydralazine  Electronically sent prescriptions: Oxcarbazepine     Report given to [address] [place] [address] at [facility].

## 2022-06-22 NOTE — PLAN OF CARE
Problem: Patient Centered Care  Goal: Patient preferences are identified and integrated in the patient's plan of care  Description: Interventions:  - What would you like us to know as we care for you?  Patient came from [facility]  - Provide timely, complete, and accurate information to patient/family  - Incorporate patient and family knowledge, values, beliefs, and cultural backgrounds into the planning and delivery of care  - Encourage patient/family to participate in care and decision-making at the level they choose  - Honor patient and family perspectives and choices  Outcome: Progressing     Problem: Patient/Family Goals  Goal: Patient/Family Long Term Goal  Description: Patient's Long Term Goal: discharge    Interventions:  - PT/OT  -Speech  -IV ABX  - See additional Care Plan goals for specific interventions  Outcome: Progressing  Goal: Patient/Family Short Term Goal  Description: Patient's Short Term Goal: improve to baseline mental status    Interventions:   - neuro  -additional medication adjustments  - See additional Care Plan goals for specific interventions  Outcome: Progressing     Problem: PAIN - ADULT  Goal: Verbalizes/displays adequate comfort level or patient's stated pain goal  Description: INTERVENTIONS:  - Encourage pt to monitor pain and request assistance  - Assess pain using appropriate pain scale  - Administer analgesics based on type and severity of pain and evaluate response  - Implement non-pharmacological measures as appropriate and evaluate response  - Consider cultural and social influences on pain and pain management  - Manage/alleviate anxiety  - Utilize distraction and/or relaxation techniques  - Monitor for opioid side effects  - Notify MD/LIP if interventions unsuccessful or patient reports new pain  - Anticipate increased pain with activity and pre-medicate as appropriate  Outcome: Progressing     Problem: RISK FOR INFECTION - ADULT  Goal: Absence of fever/infection during anticipated neutropenic period  Description: INTERVENTIONS  - Monitor WBC  - Administer growth factors as ordered  - Implement neutropenic guidelines  Outcome: Progressing     Problem: SAFETY ADULT - FALL  Goal: Free from fall injury  Description: INTERVENTIONS:  - Assess pt frequently for physical needs  - Identify cognitive and physical deficits and behaviors that affect risk of falls.   - [place] fall precautions as indicated by assessment.  - Educate pt/family on patient safety including physical limitations  - Instruct pt to call for assistance with activity based on assessment  - Modify environment to reduce risk of injury  - Provide assistive devices as appropriate  - Consider OT/PT consult to assist with strengthening/mobility  - Encourage toileting schedule  Outcome: Progressing     Problem: Altered Communication/Language Barrier  Goal: Patient/Family is able to understand and participate in their care  Description: Interventions:  - Assess communication ability and preferred communication style  - Implement communication aides and strategies  - Use visual cues when possible  - Listen attentively, be patient, do not interrupt  - Minimize distractions  - Allow time for understanding and response  - Establish method for patient to ask for assistance (call light)  - Provide an  as needed  - Communicate barriers and strategies to overcome with those who interact with patient  Outcome: Progressing     Problem: Delirium  Goal: Minimize duration of delirium  Description: Interventions:  - Encourage use of hearing aids, eye glasses  - Promote highest level of mobility daily  - Provide frequent reorientation  - Promote wakefulness i.e. lights on, blinds open  - Promote sleep, encourage patient's normal rest cycle i.e. lights off, TV off, minimize noise and interruptions  - Encourage family to assist in orientation and promotion of home routines  Outcome: Progressing

## 2022-06-22 NOTE — CM/SW NOTE
Patient failed inpatient criteria. Second level of review completed and supports observation. UR committee in agreement. Discussed with Dr. Colton White  who approves observation status. MOON  notice explained and  provided  to the patient . Copy placed in chart  Order for observation in place.     6/22/22 [facility] [place], Utilization Review   Ext [phone]

## 2022-06-22 NOTE — PLAN OF CARE
Patient new admission last shift, UTI, AMS. 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 Tylenol given for pain with little effect. Hydralazine given for SBP > 160 with little effect. Notified Dr. Stas Erickson on call for Dr. Yudi Roland MD okay with BP at this time. Change 0.9 NS to 0.45 NS @ 100 mL/hr for elevated sodium lvl. Safety precautions in place. Problem: Patient Centered Care  Goal: Patient preferences are identified and integrated in the patient's plan of care  Description: Interventions:  - What would you like us to know as we care for you? [place] affecting right side, new aphasia, dysphagia on puree nectar thick diet.   - Provide timely, complete, and accurate information to patient/family  - Incorporate patient and family knowledge, values, beliefs, and cultural backgrounds into the planning and delivery of care  - Encourage patient/family to participate in care and decision-making at the level they choose  - Honor patient and family perspectives and choices  Outcome: Progressing     Problem: Patient/Family Goals  Goal: Patient/Family Long Term Goal  Description: Patient's Long Term Goal: go home    Interventions:  -   - See additional Care Plan goals for specific interventions  Outcome: Progressing  Goal: Patient/Family Short Term Goal  Description: Patient's Short Term Goal: resolve AMS/UTI, manage HTN and pain    Interventions:   - Rocephin IV daily, hydralazine prn, Dr. Oswaldo Sanford neuro  - See additional Care Plan goals for specific interventions  Outcome: Progressing     Problem: PAIN - ADULT  Goal: Verbalizes/displays adequate comfort level or patient's stated pain goal  Description: INTERVENTIONS:  - Encourage pt to monitor pain and request assistance  - Assess pain using appropriate pain scale  - Administer analgesics based on type and severity of pain and evaluate response  - Implement non-pharmacological measures as appropriate and evaluate response  - Consider cultural and social influences on pain and pain management  - Manage/alleviate anxiety  - Utilize distraction and/or relaxation techniques  - Monitor for opioid side effects  - Notify MD/LIP if interventions unsuccessful or patient reports new pain  - Anticipate increased pain with activity and pre-medicate as appropriate  Outcome: Not Progressing     Problem: RISK FOR INFECTION - ADULT  Goal: Absence of fever/infection during anticipated neutropenic period  Description: INTERVENTIONS  - Monitor WBC  - Administer growth factors as ordered  - Implement neutropenic guidelines  Outcome: Progressing     Problem: SAFETY ADULT - FALL  Goal: Free from fall injury  Description: INTERVENTIONS:  - Assess pt frequently for physical needs  - Identify cognitive and physical deficits and behaviors that affect risk of falls.   - [place] fall precautions as indicated by assessment.  - Educate pt/family on patient safety including physical limitations  - Instruct pt to call for assistance with activity based on assessment  - Modify environment to reduce risk of injury  - Provide assistive devices as appropriate  - Consider OT/PT consult to assist with strengthening/mobility  - Encourage toileting schedule  Outcome: Progressing     Problem: Altered Communication/Language Barrier  Goal: Patient/Family is able to understand and participate in their care  Description: Interventions:  - Assess communication ability and preferred communication style  - Implement communication aides and strategies  - Use visual cues when possible  - Listen attentively, be patient, do not interrupt  - Minimize distractions  - Allow time for understanding and response  - Establish method for patient to ask for assistance (call light)  - Provide an  as needed  - Communicate barriers and strategies to overcome with those who interact with patient  Outcome: Not Progressing

## 2022-06-22 NOTE — TELEPHONE ENCOUNTER
Can we please squeeze her in to have her scheduled sometime in the the next 2 weeks? If we have to schedule her during lunch or an earlier appointment that is fine.   Thank you

## 2022-06-22 NOTE — PROGRESS NOTES
[facility]    CDI Prediction Tool Protocol (Vancomycin Initiated)    OVP (oral vancomycin prophylaxis) 125 mg PO Daily is being started in this patient based on a score of 14. Score Breakdown:  High risk antibiotic use (5 points)  Malignancy (3 points)     Long term care facility resident (1 point)  Hypoalbuminemia: < 3g/dL (1 point)  Age >/= 80 years (3 points)  Recently hospitalized: within 90 days (1 point)    This patient is currently at high risk for developing CDI due to his/her score being >/=13 points and is being started on prophylactic oral vancomycin to prevent Cdiff. This patient does not have C. difficile infection. All measures taken within this protocol are to decrease the risk of CDI development.     Chance Ch PharmD  6/22/2022  7:16 AM  [facility]  Pharmacy Extension: [phone]

## 2022-06-23 ENCOUNTER — TELEPHONE (OUTPATIENT)
Dept: NEUROLOGY | Facility: CLINIC | Age: 87
End: 2022-06-23

## 2022-06-23 NOTE — TELEPHONE ENCOUNTER
Spoke to pharmacist at [address] [place] [address] who states they already have a prescription for the medication and it should be ready for pickup today after 4pm.     Called patients daughter to inform of this. She verbalized understanding and will contact pharmacy after 4pm to verify prescription is ready.

## 2022-06-23 NOTE — TELEPHONE ENCOUNTER
Pt daughter call and inform pt has been our of the medication sense yesterday . Daughter is requesting a 3 day refill send to her [facility] until the other medication arrives.    Please advise

## 2022-07-07 ENCOUNTER — TELEMEDICINE (OUTPATIENT)
Dept: NEUROLOGY | Facility: CLINIC | Age: 87
End: 2022-07-07
Payer: MEDICARE

## 2022-07-07 ENCOUNTER — PATIENT MESSAGE (OUTPATIENT)
Dept: NEUROLOGY | Facility: CLINIC | Age: 87
End: 2022-07-07

## 2022-07-07 DIAGNOSIS — M79.2 NEUROPATHIC PAIN: Primary | ICD-10-CM

## 2022-07-07 PROCEDURE — 99214 OFFICE O/P EST MOD 30 MIN: CPT | Performed by: OTHER

## 2022-07-07 RX ORDER — GABAPENTIN 100 MG/1
CAPSULE ORAL
Qty: 360 CAPSULE | Refills: 0 | Status: SHIPPED | OUTPATIENT
Start: 2022-07-07 | End: 2022-10-05

## 2022-07-07 NOTE — TELEPHONE ENCOUNTER
From: Michelle Tinsley  To: Donnell SykesDO  Sent: 7/7/2022 6:16 AM CDT  Subject: Scarlet Louann appointment today    Good morning Dr. Eleanor Shea,  Could Scarlet Louann appointment today be a video visit? The fire department will have to help get her in and out of her home. This process is hard on her. She has residential home health. I think they can draw blood. Also, my son and I can do our best to explain her pain. Thank you for considering. I hope you had a good vacation with your family.   Sincerely,  Sabas Ewing [phone]

## 2022-07-13 ENCOUNTER — TELEPHONE (OUTPATIENT)
Dept: NEUROLOGY | Facility: CLINIC | Age: 87
End: 2022-07-13

## 2022-07-13 NOTE — TELEPHONE ENCOUNTER
Patient's Daughter called to say her Mom is experiencing a lot of anxiety, crying all the time and is very paranoid. She is hoping Dr. Christine Kiser can prescribe something to help with this.

## 2022-07-13 NOTE — TELEPHONE ENCOUNTER
Pt daughter calling back to see that is the respond from [address] [place] [address] .   Please assist

## 2022-07-14 ENCOUNTER — TELEPHONE (OUTPATIENT)
Dept: HEMATOLOGY/ONCOLOGY | Facility: HOSPITAL | Age: 87
End: 2022-07-14

## 2022-07-14 NOTE — TELEPHONE ENCOUNTER
LVMTCB for patient regarding the message she left for patient cancellation for 8/5/22 and the in person visit. Dr. Luis Armando Hairston pt.

## 2022-07-14 NOTE — TELEPHONE ENCOUNTER
Patient's Daughter called to say she is fine with the new medications Dr. Christine Kiser is suggesting BUT needs clarification on what she will now be taking and what if anything of her old medications she should stop. Also needs to know when to start new medications.

## 2022-07-14 NOTE — TELEPHONE ENCOUNTER
Daughter called extremely agitated that she has not received a response from anyone since her message from this morning. I explained he has been with Patients and she understands that  is very busy but still looking for more of an immediate response. She appears to be very stressed and is having a hard time seeing her Mom in this  condition.

## 2022-07-15 ENCOUNTER — APPOINTMENT (OUTPATIENT)
Dept: HEMATOLOGY/ONCOLOGY | Facility: HOSPITAL | Age: 87
End: 2022-07-15
Attending: INTERNAL MEDICINE
Payer: MEDICARE

## 2022-07-15 ENCOUNTER — TELEPHONE (OUTPATIENT)
Dept: NEUROLOGY | Facility: CLINIC | Age: 87
End: 2022-07-15

## 2022-07-15 DIAGNOSIS — M79.2 NEUROPATHIC PAIN: Primary | ICD-10-CM

## 2022-07-15 DIAGNOSIS — F22 PARANOID STATE (HCC): ICD-10-CM

## 2022-07-15 DIAGNOSIS — F22 ACUTE PARANOIA (HCC): ICD-10-CM

## 2022-07-15 DIAGNOSIS — F32.A ACUTE DEPRESSION: Primary | ICD-10-CM

## 2022-07-15 DIAGNOSIS — R45.83 EXCESSIVE CRYING OF ADULT: ICD-10-CM

## 2022-07-15 DIAGNOSIS — M79.2 NEUROPATHIC PAIN: ICD-10-CM

## 2022-07-15 DIAGNOSIS — F48.2 PSEUDOBULBAR AFFECT: ICD-10-CM

## 2022-07-15 RX ORDER — LAMOTRIGINE 25 MG/1
TABLET, CHEWABLE ORAL
Qty: 84 TABLET | Refills: 0 | Status: SHIPPED | OUTPATIENT
Start: 2022-07-15 | End: 2022-08-26

## 2022-07-15 NOTE — TELEPHONE ENCOUNTER
Justine Duran kept her Mom on Gabapentin b/c it helps her pain. She is paranoid her caregiver will hurt her. Daughter states   Per Justine Duran she is always crying. Patient cries when she sees the family. Advised patient's daughter that gabapentin was likely making the patient much more depressed. Recommended they stop the morning dose. Recommend he decrease the evening dose from 300 mg to 100 mg every evening. Explained I would add lamotrigine. This can act as a mood stabilizer and treat her neuropathic pain. Anticipate that the patient's mood will gradually improve once her gabapentin is decreased. Advised her that this Hannah Mccray would start her mother on lamotrigine. We will start at 25 mg daily for 2 weeks and then increase to 50 mg daily for 2 weeks and then 75 mg. Patient will be seen in follow-up at that time. Discussed adverse effects including but not limited to Oden-Emmanuel syndrome. Described presentation of the rash and the importance of stopping lamotrigine and taking the patient to the hospital.    Also recommended that they establish care with geriatric psychiatry. Advised the patient's daughter that if the patient continues to have paranoid ideations she may benefit from being on aripiprazole (Abilify) which could treat paranoia and her depression. Patient's paranoid ideation was ongoing prior to her hospitalization. Suspect gabapentin may have made this worse. Paranoid ideation is more commonly seen in right hemisphere strokes and left hemisphere strokes, but given that patient's who have left MCA strokes are also aphasic may be underreported. Low suspicion that she has had an interval infarct. 1. Acute depression  - lamoTRIgine (LAMICTAL) 25 MG Oral Chew Tab; Chew 1 tablet (25 mg total) by mouth daily for 14 days, THEN 2 tablets (50 mg total) daily for 14 days, THEN 3 tablets (75 mg total) daily for 14 days. Dispense: 84 tablet;  Refill: 0  - OP REFERRAL TO PSYCHIATRY  -  NAVIGATOR    2. Excessive crying of adult  - OP REFERRAL TO PSYCHIATRY  [facility] NAVIGATOR    3. Paranoid state ([address]  - OP REFERRAL TO Jessica Gomez    4. Neuropathic pain  - lamoTRIgine (LAMICTAL) 25 MG Oral Chew Tab; Chew 1 tablet (25 mg total) by mouth daily for 14 days, THEN 2 tablets (50 mg total) daily for 14 days, THEN 3 tablets (75 mg total) daily for 14 days. Dispense: 84 tablet;  Refill: 0  - OP REFERRAL TO PSYCHIATRY  [facility] NAVIGATOR        Lary Cormier DO  Staff Vascular & General Neurology

## 2022-07-15 NOTE — TELEPHONE ENCOUNTER
Patients daughter called Adolph Hall) requesting an update on her mothers medication (follow-up) from Dr. Anson Millan message. Explained to patient Dr. Anson Millan is in clinic with patients. Informed her any Farman messages sent to providers come to us first and then we route the message to the providers for feedback/advice. Explained to patients daughter any "Shahab P. Tabatabai, Broker"hart message should not be an emergency matter since providers are not always able to see the message immediately. Advised her to seek care at ER if there is ever an emergency with patients health. As we do not want patients to wait for the providers response in an emergency situation. She verbalized understanding and states it is very hard for her to get her mother to the ER as it causes her to have panic attacks with all people she does not recognize. This write acknowledged response and once again explained to her the best way to seek immediate care is to call 911 and have the patient come to the ER for any medical emergencies. Informed her Dr. Anson Millan is in clinic today and is seeing patients, but will be giving her a call at the end of the day. She was understanding and will have her phone with her waiting for Dr. Oscar Irving call.

## 2022-08-03 RX ORDER — TRAMADOL HYDROCHLORIDE 50 MG/1
50 TABLET ORAL EVERY 8 HOURS PRN
Qty: 40 TABLET | Refills: 0 | Status: SHIPPED | OUTPATIENT
Start: 2022-08-03

## 2022-08-05 ENCOUNTER — APPOINTMENT (OUTPATIENT)
Dept: HEMATOLOGY/ONCOLOGY | Facility: HOSPITAL | Age: 87
End: 2022-08-05
Attending: INTERNAL MEDICINE
Payer: MEDICARE

## 2022-08-15 ENCOUNTER — TELEMEDICINE (OUTPATIENT)
Dept: NEUROLOGY | Facility: CLINIC | Age: 87
End: 2022-08-15
Payer: MEDICARE

## 2022-08-15 DIAGNOSIS — M79.2 NEUROPATHIC PAIN: Primary | ICD-10-CM

## 2022-08-15 DIAGNOSIS — I63.512 ACUTE ISCHEMIC LEFT MCA STROKE (HCC): ICD-10-CM

## 2022-08-15 DIAGNOSIS — F93.0 SEPARATION ANXIETY: ICD-10-CM

## 2022-08-15 PROCEDURE — 99215 OFFICE O/P EST HI 40 MIN: CPT | Performed by: OTHER

## 2022-08-15 RX ORDER — GABAPENTIN 100 MG/1
CAPSULE ORAL
Qty: 360 CAPSULE | Refills: 11 | Status: SHIPPED | OUTPATIENT
Start: 2022-08-15 | End: 2022-11-13

## 2022-08-15 RX ORDER — ASPIRIN 81 MG
100 TABLET, DELAYED RELEASE (ENTERIC COATED) ORAL 2 TIMES DAILY
Qty: 60 TABLET | Refills: 5 | Status: SHIPPED | OUTPATIENT
Start: 2022-08-15

## 2022-09-01 NOTE — PLAN OF CARE
TRISH KERR    Patient Age: 68 year old   Interpreting service used: No    Insurance on file confirmed with caller: Yes    Patient seen within 1 year by a provider in primary care? Yes-      Refill to be: ePrescribed    Medication requested to be refilled: Jardiance - Not on current medication list.  Preferred Pharmacy- [facility]    Addition Information: patient states she would like for Dr Vazquez to send Jardiance to [facility]? They need to get the script before they can give her a price.      Does patient have enough to medication for 72 business hours? No- Route message to provider clinical pool- HIGH PRIORITY    Caller informed to check with the pharmacy later for their refill.  If problems arise, we will contact patient.    Message read back to caller for accuracy: Yes     WEIGHT AND HEIGHT:   Wt Readings from Last 1 Encounters:   09/01/22 81.2 kg (179 lb)     Ht Readings from Last 1 Encounters:   09/01/22 5' 2\" (1.575 m)     BMI Readings from Last 1 Encounters:   09/01/22 32.74 kg/m²       ALLERGIES:  Patient has no known allergies.  Current Outpatient Medications   Medication Sig Dispense Refill   • metformin (GLUCOPHAGE) 1000 MG tablet Take 1 tablet by mouth in the morning and 1 tablet in the evening. Take with meals. 180 tablet 3   • insulin glargine (Lantus SoloStar) 100 UNIT/ML pen-injector INJECT 32 UNITS EVERY EVENING 15 mL 3   • glimepiride (AMARYL) 4 MG tablet Take 1 tablet by mouth in the morning and 1 tablet before bedtime. 180 tablet 0   • levothyroxine 75 MCG tablet Take 1 tablet by mouth daily. 90 tablet 0   • lisinopril (ZESTRIL) 5 MG tablet Take 1 tablet by mouth daily. 90 tablet 3   • omeprazole (PriLOSEC) 40 MG capsule Take 1 capsule by mouth daily. 90 capsule 3   • Lancets Misc Test 2X daily Dx DM TYPE II-UNCOMPL E11.9 Insulin:yes Accu-Chek 200 each 3   • atorvastatin (LIPITOR) 20 MG tablet TAKE 1 TABLET BY MOUTH DAILY 90 tablet 3   • blood glucose (Accu-Chek SmartView) test  Problem: CARDIOVASCULAR - ADULT  Goal: Maintains optimal cardiac output and hemodynamic stability  INTERVENTIONS:  - Monitor vital signs, rhythm, and trends  - Monitor for bleeding, hypotension and signs of decreased cardiac output  - Evaluate effectivenes Monitor labs and rhythm and assess patient for signs and symptoms of electrolyte imbalances  - Administer electrolyte replacement as ordered  - Monitor response to electrolyte replacements, including rhythm and repeat lab results as appropriate  - Fluid re strip Test blood sugar 2 times daily as directed. Diagnosis: E11.9. INSULIN DEPENDANT:  strip 3   • tiZANidine (ZANAFLEX) 2 MG tablet Take 1 tablet by mouth nightly as needed for Muscle spasms. 30 tablet 0   • Insulin Pen Needle (PEN NEEDLES) 32G X 4 MM Misc Use with daily injection     • gabapentin (NEURONTIN) 600 MG tablet 600 mg.     • Alcohol Swabs (B-D SINGLE USE SWABS REGULAR) Pads Use as directed     • Blood Glucose Monitoring Suppl (ACCU-CHEK FREDRICK SMARTVIEW) w/Device Kit Test 2X daily Dx DM TYPE II-UNCOMPL E11.9 Insulin:yes Accu-Chek Fredrick Smartview     • Insulin Syringe-Needle U-100 31G X 15/64\" 1 ML Misc Use with Lantus nightly       No current facility-administered medications for this visit.         CALL BACK INFO: Ok to leave response (including medical information) with family member or on answering machine        PCP: Kathy Cobb MD         INS: Payor: [employer]IC[employer]VANTAGE / Plan: [facility] 076/321 / Product Type: [employer] MEDICARE ADVANTAGE   PATIENT ADDRESS:  [address] [place] [address] [place] [address]       Monitor for opioid side effects  - Notify MD/LIP if interventions unsuccessful or patient reports new pain  Outcome: Progressing  C/o abd pain. Rates 6/10. Denies any chest pain at this time. Pain improved after giving zofran PRN.  Pt feels nauseated    Pro

## 2022-09-19 DIAGNOSIS — G40.909 SEIZURE DISORDER (HCC): Primary | ICD-10-CM

## 2022-09-19 DIAGNOSIS — D50.9 IRON DEFICIENCY ANEMIA, UNSPECIFIED IRON DEFICIENCY ANEMIA TYPE: ICD-10-CM

## 2022-09-19 DIAGNOSIS — R53.83 OTHER FATIGUE: ICD-10-CM

## 2022-09-19 DIAGNOSIS — I63.039 CEREBROVASCULAR ACCIDENT (CVA) DUE TO THROMBOSIS OF CAROTID ARTERY, UNSPECIFIED BLOOD VESSEL LATERALITY (HCC): ICD-10-CM

## 2022-10-29 ENCOUNTER — APPOINTMENT (OUTPATIENT)
Dept: CT IMAGING | Facility: HOSPITAL | Age: 87
End: 2022-10-29
Attending: STUDENT IN AN ORGANIZED HEALTH CARE EDUCATION/TRAINING PROGRAM
Payer: MEDICARE

## 2022-10-29 ENCOUNTER — HOSPITAL ENCOUNTER (INPATIENT)
Facility: HOSPITAL | Age: 87
LOS: 5 days | Discharge: HOSPICE | End: 2022-11-03
Attending: STUDENT IN AN ORGANIZED HEALTH CARE EDUCATION/TRAINING PROGRAM | Admitting: STUDENT IN AN ORGANIZED HEALTH CARE EDUCATION/TRAINING PROGRAM
Payer: MEDICARE

## 2022-10-29 ENCOUNTER — APPOINTMENT (OUTPATIENT)
Dept: GENERAL RADIOLOGY | Facility: HOSPITAL | Age: 87
End: 2022-10-29
Attending: STUDENT IN AN ORGANIZED HEALTH CARE EDUCATION/TRAINING PROGRAM
Payer: MEDICARE

## 2022-10-29 DIAGNOSIS — E86.0 DEHYDRATION: ICD-10-CM

## 2022-10-29 DIAGNOSIS — R53.1 WEAKNESS GENERALIZED: Primary | ICD-10-CM

## 2022-10-29 LAB
ALBUMIN SERPL-MCNC: 3.2 G/DL (ref 3.4–5)
ALP LIVER SERPL-CCNC: 81 U/L
ALT SERPL-CCNC: 42 U/L
ANION GAP SERPL CALC-SCNC: 8 MMOL/L (ref 0–18)
AST SERPL-CCNC: 30 U/L (ref 15–37)
BASOPHILS # BLD AUTO: 0.04 X10(3) UL (ref 0–0.2)
BASOPHILS NFR BLD AUTO: 0.3 %
BILIRUB DIRECT SERPL-MCNC: <0.1 MG/DL (ref 0–0.2)
BILIRUB SERPL-MCNC: 0.4 MG/DL (ref 0.1–2)
BILIRUB UR QL: NEGATIVE
BUN BLD-MCNC: 127 MG/DL (ref 7–18)
BUN/CREAT SERPL: 80.4 (ref 10–20)
CALCIUM BLD-MCNC: 10 MG/DL (ref 8.5–10.1)
CHLORIDE SERPL-SCNC: 121 MMOL/L (ref 98–112)
CLARITY UR: CLEAR
CO2 SERPL-SCNC: 21 MMOL/L (ref 21–32)
COLOR UR: YELLOW
CREAT BLD-MCNC: 1.58 MG/DL
DEPRECATED RDW RBC AUTO: 67.5 FL (ref 35.1–46.3)
EOSINOPHIL # BLD AUTO: 0.12 X10(3) UL (ref 0–0.7)
EOSINOPHIL NFR BLD AUTO: 0.9 %
ERYTHROCYTE [DISTWIDTH] IN BLOOD BY AUTOMATED COUNT: 18.3 % (ref 11–15)
GFR SERPLBLD BASED ON 1.73 SQ M-ARVRAT: 32 ML/MIN/1.73M2 (ref 60–?)
GLUCOSE BLD-MCNC: 150 MG/DL (ref 70–99)
GLUCOSE UR-MCNC: NEGATIVE MG/DL
HCT VFR BLD AUTO: 30.5 %
HGB BLD-MCNC: 9.6 G/DL
HGB UR QL STRIP.AUTO: NEGATIVE
HYALINE CASTS #/AREA URNS AUTO: PRESENT /LPF
IMM GRANULOCYTES # BLD AUTO: 0.14 X10(3) UL (ref 0–1)
IMM GRANULOCYTES NFR BLD: 1 %
KETONES UR-MCNC: NEGATIVE MG/DL
LEUKOCYTE ESTERASE UR QL STRIP.AUTO: NEGATIVE
LIPASE SERPL-CCNC: 299 U/L (ref 73–393)
LYMPHOCYTES # BLD AUTO: 0.95 X10(3) UL (ref 1–4)
LYMPHOCYTES NFR BLD AUTO: 6.9 %
MCH RBC QN AUTO: 31.8 PG (ref 26–34)
MCHC RBC AUTO-ENTMCNC: 31.5 G/DL (ref 31–37)
MCV RBC AUTO: 101 FL
MONOCYTES # BLD AUTO: 0.51 X10(3) UL (ref 0.1–1)
MONOCYTES NFR BLD AUTO: 3.7 %
NEUTROPHILS # BLD AUTO: 11.92 X10 (3) UL (ref 1.5–7.7)
NEUTROPHILS # BLD AUTO: 11.92 X10(3) UL (ref 1.5–7.7)
NEUTROPHILS NFR BLD AUTO: 87.2 %
NITRITE UR QL STRIP.AUTO: NEGATIVE
OSMOLALITY SERPL CALC.SUM OF ELEC: 354 MOSM/KG (ref 275–295)
PH UR: 5 [PH] (ref 5–8)
PLATELET # BLD AUTO: 139 10(3)UL (ref 150–450)
PLATELET MORPHOLOGY: NORMAL
POTASSIUM SERPL-SCNC: 5.6 MMOL/L (ref 3.5–5.1)
PROT SERPL-MCNC: 7.2 G/DL (ref 6.4–8.2)
PROT UR-MCNC: NEGATIVE MG/DL
RBC # BLD AUTO: 3.02 X10(6)UL
SARS-COV-2 RNA RESP QL NAA+PROBE: NOT DETECTED
SODIUM SERPL-SCNC: 150 MMOL/L (ref 136–145)
SP GR UR STRIP: 1.01 (ref 1–1.03)
TROPONIN I HIGH SENSITIVITY: 42 NG/L
UROBILINOGEN UR STRIP-ACNC: <2
VIT C UR-MCNC: 40 MG/DL
WBC # BLD AUTO: 13.7 X10(3) UL (ref 4–11)

## 2022-10-29 PROCEDURE — 71045 X-RAY EXAM CHEST 1 VIEW: CPT | Performed by: STUDENT IN AN ORGANIZED HEALTH CARE EDUCATION/TRAINING PROGRAM

## 2022-10-29 PROCEDURE — 99223 1ST HOSP IP/OBS HIGH 75: CPT | Performed by: HOSPITALIST

## 2022-10-29 PROCEDURE — 74177 CT ABD & PELVIS W/CONTRAST: CPT | Performed by: STUDENT IN AN ORGANIZED HEALTH CARE EDUCATION/TRAINING PROGRAM

## 2022-10-29 RX ORDER — PREDNISONE 1 MG/1
5 TABLET ORAL DAILY
Status: DISCONTINUED | OUTPATIENT
Start: 2022-10-30 | End: 2022-11-03

## 2022-10-29 RX ORDER — ATORVASTATIN CALCIUM 80 MG/1
80 TABLET, FILM COATED ORAL NIGHTLY
Status: DISCONTINUED | OUTPATIENT
Start: 2022-10-29 | End: 2022-11-03

## 2022-10-29 RX ORDER — TRAMADOL HYDROCHLORIDE 50 MG/1
50 TABLET ORAL EVERY 8 HOURS PRN
Status: DISCONTINUED | OUTPATIENT
Start: 2022-10-29 | End: 2022-10-30

## 2022-10-29 RX ORDER — ASPIRIN 81 MG/1
81 TABLET, CHEWABLE ORAL DAILY
Status: DISCONTINUED | OUTPATIENT
Start: 2022-10-29 | End: 2022-10-30

## 2022-10-29 RX ORDER — SODIUM CHLORIDE 9 MG/ML
125 INJECTION, SOLUTION INTRAVENOUS CONTINUOUS
Status: DISCONTINUED | OUTPATIENT
Start: 2022-10-29 | End: 2022-10-30

## 2022-10-29 RX ORDER — BISACODYL 10 MG
10 SUPPOSITORY, RECTAL RECTAL
Status: DISCONTINUED | OUTPATIENT
Start: 2022-10-29 | End: 2022-11-03

## 2022-10-29 RX ORDER — HYDRALAZINE HYDROCHLORIDE 25 MG/1
25 TABLET, FILM COATED ORAL 3 TIMES DAILY
Status: DISCONTINUED | OUTPATIENT
Start: 2022-10-29 | End: 2022-11-03

## 2022-10-29 RX ORDER — HEPARIN SODIUM 5000 [USP'U]/ML
5000 INJECTION, SOLUTION INTRAVENOUS; SUBCUTANEOUS EVERY 12 HOURS SCHEDULED
Status: DISCONTINUED | OUTPATIENT
Start: 2022-10-29 | End: 2022-10-30

## 2022-10-29 RX ORDER — DOCUSATE SODIUM 100 MG/1
100 CAPSULE, LIQUID FILLED ORAL 2 TIMES DAILY PRN
Status: DISCONTINUED | OUTPATIENT
Start: 2022-10-29 | End: 2022-11-03

## 2022-10-29 RX ORDER — ONDANSETRON 2 MG/ML
4 INJECTION INTRAMUSCULAR; INTRAVENOUS EVERY 6 HOURS PRN
Status: DISCONTINUED | OUTPATIENT
Start: 2022-10-29 | End: 2022-11-03

## 2022-10-29 RX ORDER — SENNOSIDES 8.6 MG
17.2 TABLET ORAL NIGHTLY PRN
Status: DISCONTINUED | OUTPATIENT
Start: 2022-10-29 | End: 2022-11-03

## 2022-10-29 RX ORDER — ONDANSETRON 2 MG/ML
4 INJECTION INTRAMUSCULAR; INTRAVENOUS ONCE
Status: COMPLETED | OUTPATIENT
Start: 2022-10-29 | End: 2022-10-29

## 2022-10-29 RX ORDER — ACETAMINOPHEN 325 MG/1
325 TABLET ORAL EVERY 6 HOURS PRN
Status: DISCONTINUED | OUTPATIENT
Start: 2022-10-29 | End: 2022-11-03

## 2022-10-29 RX ORDER — FAMOTIDINE 10 MG
10 TABLET ORAL DAILY
Status: DISCONTINUED | OUTPATIENT
Start: 2022-10-29 | End: 2022-11-03

## 2022-10-29 RX ORDER — ESCITALOPRAM OXALATE 10 MG/1
10 TABLET ORAL DAILY
Status: DISCONTINUED | OUTPATIENT
Start: 2022-10-29 | End: 2022-11-03

## 2022-10-29 RX ORDER — DEXTROSE AND SODIUM CHLORIDE 5; .45 G/100ML; G/100ML
INJECTION, SOLUTION INTRAVENOUS CONTINUOUS
Status: DISCONTINUED | OUTPATIENT
Start: 2022-10-29 | End: 2022-10-30

## 2022-10-29 RX ORDER — POLYETHYLENE GLYCOL 3350 17 G/17G
17 POWDER, FOR SOLUTION ORAL DAILY PRN
Status: DISCONTINUED | OUTPATIENT
Start: 2022-10-29 | End: 2022-11-03

## 2022-10-29 RX ORDER — GABAPENTIN 100 MG/1
100 CAPSULE ORAL 3 TIMES DAILY
Status: DISCONTINUED | OUTPATIENT
Start: 2022-10-29 | End: 2022-11-03

## 2022-10-29 NOTE — ED INITIAL ASSESSMENT (HPI)
Pt presents from home via EMS for c/o vomiting x2 over the last two days, each occurrence of vomiting occurred while drinking milk. Pt cared for at home by family who state they contacted the PCP who told the family to come to the ED for evaluation. Pt presents awake, mumbles in response to questions. Pt with hx of stroke.

## 2022-10-29 NOTE — ED QUICK NOTES
Pt's daughter to bedside. Daughter reports anorexia for the last few weeks, worse the last few days. Daughter reports pt has declined all po intake today except AM meds with pudding. Daughter reports no vomiting today, reports \"several\" episodes yesterday. Daughter reports pt has been more lethargic, intermittently rubbing her abd and moaning, and c/o headache since yesterday.

## 2022-10-29 NOTE — ED QUICK NOTES
Orders for admission, patient is aware of plan and ready to go upstairs. Any questions, please call ED RN Jessica Hawkins at extension [phone].      Patient Covid vaccination status: Fully vaccinated     COVID Test Ordered in ED: Rapid SARS-CoV-2 by PCR    COVID Suspicion at Admission: Low clinical suspicion for COVID    Running Infusions:      Mental Status/LOC at time of transport: Awake, orientated x0    Other pertinent information: stroke in may, nonverbal.   CIWA score: N/A   NIH score:  N/A

## 2022-10-30 ENCOUNTER — APPOINTMENT (OUTPATIENT)
Dept: CT IMAGING | Facility: HOSPITAL | Age: 87
End: 2022-10-30
Attending: HOSPITALIST
Payer: MEDICARE

## 2022-10-30 LAB
ANION GAP SERPL CALC-SCNC: 5 MMOL/L (ref 0–18)
ANION GAP SERPL CALC-SCNC: 7 MMOL/L (ref 0–18)
BASOPHILS # BLD AUTO: 0.03 X10(3) UL (ref 0–0.2)
BASOPHILS NFR BLD AUTO: 0.2 %
BUN BLD-MCNC: 64 MG/DL (ref 7–18)
BUN BLD-MCNC: 80 MG/DL (ref 7–18)
BUN/CREAT SERPL: 57.1 (ref 10–20)
BUN/CREAT SERPL: 66.1 (ref 10–20)
CALCIUM BLD-MCNC: 8.5 MG/DL (ref 8.5–10.1)
CALCIUM BLD-MCNC: 8.7 MG/DL (ref 8.5–10.1)
CHLORIDE SERPL-SCNC: 121 MMOL/L (ref 98–112)
CHLORIDE SERPL-SCNC: 124 MMOL/L (ref 98–112)
CO2 SERPL-SCNC: 19 MMOL/L (ref 21–32)
CO2 SERPL-SCNC: 21 MMOL/L (ref 21–32)
CREAT BLD-MCNC: 1.12 MG/DL
CREAT BLD-MCNC: 1.21 MG/DL
DEPRECATED RDW RBC AUTO: 70.5 FL (ref 35.1–46.3)
EOSINOPHIL # BLD AUTO: 0.28 X10(3) UL (ref 0–0.7)
EOSINOPHIL NFR BLD AUTO: 2.2 %
ERYTHROCYTE [DISTWIDTH] IN BLOOD BY AUTOMATED COUNT: 18.3 % (ref 11–15)
GFR SERPLBLD BASED ON 1.73 SQ M-ARVRAT: 44 ML/MIN/1.73M2 (ref 60–?)
GFR SERPLBLD BASED ON 1.73 SQ M-ARVRAT: 48 ML/MIN/1.73M2 (ref 60–?)
GLUCOSE BLD-MCNC: 158 MG/DL (ref 70–99)
GLUCOSE BLD-MCNC: 171 MG/DL (ref 70–99)
HCT VFR BLD AUTO: 27.5 %
HEMOCCULT STL QL: NEGATIVE
HGB BLD-MCNC: 8.4 G/DL
IMM GRANULOCYTES # BLD AUTO: 0.13 X10(3) UL (ref 0–1)
IMM GRANULOCYTES NFR BLD: 1 %
LYMPHOCYTES # BLD AUTO: 0.98 X10(3) UL (ref 1–4)
LYMPHOCYTES NFR BLD AUTO: 7.6 %
MAGNESIUM SERPL-MCNC: 2.3 MG/DL (ref 1.6–2.6)
MCH RBC QN AUTO: 31.9 PG (ref 26–34)
MCHC RBC AUTO-ENTMCNC: 30.5 G/DL (ref 31–37)
MCV RBC AUTO: 104.6 FL
MONOCYTES # BLD AUTO: 0.88 X10(3) UL (ref 0.1–1)
MONOCYTES NFR BLD AUTO: 6.9 %
NEUTROPHILS # BLD AUTO: 10.54 X10 (3) UL (ref 1.5–7.7)
NEUTROPHILS # BLD AUTO: 10.54 X10(3) UL (ref 1.5–7.7)
NEUTROPHILS NFR BLD AUTO: 82.1 %
OSMOLALITY SERPL CALC.SUM OF ELEC: 326 MOSM/KG (ref 275–295)
OSMOLALITY SERPL CALC.SUM OF ELEC: 337 MOSM/KG (ref 275–295)
PHOSPHATE SERPL-MCNC: 2.4 MG/DL (ref 2.5–4.9)
PLATELET # BLD AUTO: 125 10(3)UL (ref 150–450)
POTASSIUM SERPL-SCNC: 4.3 MMOL/L (ref 3.5–5.1)
POTASSIUM SERPL-SCNC: 4.4 MMOL/L (ref 3.5–5.1)
RBC # BLD AUTO: 2.63 X10(6)UL
SODIUM SERPL-SCNC: 147 MMOL/L (ref 136–145)
SODIUM SERPL-SCNC: 150 MMOL/L (ref 136–145)
WBC # BLD AUTO: 12.8 X10(3) UL (ref 4–11)

## 2022-10-30 PROCEDURE — 70450 CT HEAD/BRAIN W/O DYE: CPT | Performed by: HOSPITALIST

## 2022-10-30 PROCEDURE — 99233 SBSQ HOSP IP/OBS HIGH 50: CPT | Performed by: HOSPITALIST

## 2022-10-30 PROCEDURE — 99223 1ST HOSP IP/OBS HIGH 75: CPT | Performed by: OTHER

## 2022-10-30 RX ORDER — TRAMADOL HYDROCHLORIDE 50 MG/1
50 TABLET ORAL EVERY 12 HOURS PRN
Status: DISCONTINUED | OUTPATIENT
Start: 2022-10-30 | End: 2022-10-30

## 2022-10-30 RX ORDER — METOPROLOL TARTRATE 5 MG/5ML
2.5 INJECTION INTRAVENOUS ONCE
Status: COMPLETED | OUTPATIENT
Start: 2022-10-30 | End: 2022-10-30

## 2022-10-30 RX ORDER — DEXTROSE MONOHYDRATE 50 MG/ML
INJECTION, SOLUTION INTRAVENOUS CONTINUOUS
Status: DISCONTINUED | OUTPATIENT
Start: 2022-10-30 | End: 2022-10-31

## 2022-10-30 NOTE — H&P
[facility]    PATIENT'S NAME: Susannah Matos [facility]   ATTENDING PHYSICIAN: Fifi Barrow MD   PATIENT ACCOUNT#:   [id number]    LOCATION:  [address] [place] [address] RECORD #:   [id number]       YOB: 1935  ADMISSION DATE:       10/29/2022    HISTORY AND PHYSICAL EXAMINATION    CHIEF COMPLAINT:  Nausea, vomiting. 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 Patient's history has been obtained from patient's medical records as well as family members, who are providing the history. They state over the past several weeks, patient has been having decreased p.o. intake and not been eating, and they do believe that this is acid reflux. Over the past few days, she has been also having some vomiting and not been able to tolerate p.o. Currently, she denies any fevers, chills, headaches, blurred vision, palpitations, nausea, vomiting. PAST MEDICAL HISTORY:  Positive for history of acute CVA in May 2022, breast cancer, CKD, depression, hypertension, hypercholesterolemia, peptic ulcer. PAST SURGICAL HISTORY:  Positive for bilateral mastectomy, cardiac catheterization, EGD, hip surgery, hysterectomy. MEDICATIONS:  Home medications have been reviewed and reconciled. Please refer to patient's chart for a detailed reviewed regarding patient's home medications. ALLERGIES:  No known drug allergies. FAMILY HISTORY:  Positive for mother . Father  of old age. Brother has a history of heart disease. SOCIAL HISTORY:  Patient is a never smoker. Denies any alcohol or illicit drug use. REVIEW OF SYSTEMS:  Positive for vomiting, is otherwise negative.       PHYSICAL EXAMINATION:    GENERAL:  Patient is lying in bed, appears to be in no acute distress at this time. She is alert and answering questions. VITAL SIGNS:  Patient's temperature is 98, pulse is 89, respirations are 17, blood pressure is 120/70, saturating 97% on room air. HEENT:  Extraocular muscles intact. Pupils equal, round, and reactive. Atraumatic, normocephalic. LUNGS:  Good air entry bilaterally. HEART:  S1 and S2 appreciated. ABDOMEN:  Soft, nontender, nondistended. EXTREMITIES:  Peripheral pulses positive. MUSCULOSKELETAL:  Other extremities have full range of motion. NEUROLOGIC:  No new focal neurological deficits at this time. Patient does have a residual aphasia and right-sided weakness. SKIN:  No rashes. PSYCHIATRIC:  Flat affect. LABORATORY DATA:  Patient's glucose is 150, sodium is 150, potassium is 5.6, chloride is 121, creatinine is 1.58. Patient's WBC is 13.7, hemoglobin is 9.6, platelet count is 879. Patient did undergo a chest x-ray, which shows negative for any acute intrathoracic processes. Patient underwent a CT of the abdomen and pelvis, which shows hepatic steatosis, large amount of excessive stool seen throughout the colon and rectum secondary to constipation with fecal impaction. No obstruction. No acute process of the abdomen or pelvis. ASSESSMENT AND PLAN:  Patient is an 57-year-old female with past medical history of multiple comorbid conditions, who has been admitted to the hospital with dehydration and poor p.o. intake. 1.   Failure to thrive and dehydration. At this time, we will continue IV hydration. We will continue to monitor patient closely. Patient is constipated. We will start the patient on bowel regimen and we will continue to monitor the patient closely. 2.   Hypernatremia. We will start the patient on D5 0.45 and will continue to monitor. 3.   Acute renal failure on chronic kidney disease stage 3. We will hold patient's lisinopril at this time.   Continue IV hydration. Repeat labs in a.m.  4.   History of cerebrovascular accident. We will resume patient's aspirin. 5.   Hypercholesterolemia. Patient is on Lipitor. We will continue at this time. 6.   History of depression. We will continue patient's citalopram.    7. VTE prophylaxis will be heparin subcutaneous 5000 units b.i.d.    8.   Disposition: At this time, we will monitor patient closely. Patient is a DNR with full treatment. Greater than 70 minutes spent, with greater than 50% of time spent face-to-face.       Dictated By Carlos Costello MD  d: 10/29/2022 17:51:18  t: 10/29/2022 20:53:57  [facility] [id number]  DRG/

## 2022-10-30 NOTE — PLAN OF CARE
Patient first hospital stay. Patient is hard to understand (recent stroke in may per family). Left facial droop and garbled speech. Right arm tremor and contraction. Bed bound at home. Turning patient every 2 hour. RN gave suppository and senna. Patient took half pills, gaged and refused any more pills. Sodium is elevated. Fluids changed per MD orders to D5 water. CT head to r/o any other strokes per md. Bowel movement. Tarry. Stool sent for blood. Monitoring patient closely. Updated family on plan of care.      Problem: Patient Centered Care  Goal: Patient preferences are identified and integrated in the patient's plan of care  Description: Interventions:  - What would you like us to know as we care for you?   - Provide timely, complete, and accurate information to patient/family  - Incorporate patient and family knowledge, values, beliefs, and cultural backgrounds into the planning and delivery of care  - Encourage patient/family to participate in care and decision-making at the level they choose  - Honor patient and family perspectives and choices  Outcome: Progressing     Problem: Patient/Family Goals  Goal: Patient/Family Long Term Goal  Description: Patient's Long Term Goal:     Interventions:  -   - See additional Care Plan goals for specific interventions  Outcome: Progressing     Problem: Patient/Family Goals  Goal: Patient/Family Short Term Goal  Description: Patient's Short Term Goal:     Interventions:   -   - See additional Care Plan goals for specific interventions  Outcome: Not Progressing     Problem: GASTROINTESTINAL - ADULT  Goal: Maintains adequate nutritional intake (undernourished)  Description: INTERVENTIONS:  - Monitor percentage of each meal consumed  - Identify factors contributing to decreased intake, treat as appropriate  - Assist with meals as needed  - Monitor I&O, WT and lab values  - Obtain nutritional consult as needed  - Optimize oral hygiene and moisture  - Encourage food from home; allow for food preferences  - Enhance eating environment  Outcome: Not Progressing     Problem: Impaired Activities of Daily Living  Goal: Achieve highest/safest level of independence in self care  Description: Interventions:  - Assess ability and encourage patient to participate in ADLs to maximize function  - Promote sitting position while performing ADLs such as feeding, grooming, and bathing  - Educate and encourage patient/family in tolerated functional activity level and precautions during self-care    Outcome: Not Progressing     Problem: Impaired Communication  Goal: Patient will achieve maximal communication potential  Description: Interventions:  Outcome: Not Progressing     Problem: SAFETY ADULT - FALL  Goal: Free from fall injury  Description: INTERVENTIONS:  - Assess pt frequently for physical needs  - Identify cognitive and physical deficits and behaviors that affect risk of falls.   - [place] fall precautions as indicated by assessment.  - Educate pt/family on patient safety including physical limitations  - Instruct pt to call for assistance with activity based on assessment  - Modify environment to reduce risk of injury  - Provide assistive devices as appropriate  - Consider OT/PT consult to assist with strengthening/mobility  - Encourage toileting schedule  Outcome: Not Progressing     Problem: DISCHARGE PLANNING  Goal: Discharge to home or other facility with appropriate resources  Description: INTERVENTIONS:  - Identify barriers to discharge w/pt and caregiver  - Include patient/family/discharge partner in discharge planning  - Arrange for needed discharge resources and transportation as appropriate  - Identify discharge learning needs (meds, wound care, etc)  - Arrange for interpreters to assist at discharge as needed  - Consider post-discharge preferences of patient/family/discharge partner  - Complete POLST form as appropriate  - Assess patient's ability to be responsible for managing their own health  - Refer to Case Management Department for coordinating discharge planning if the patient needs post-hospital services based on physician/LIP order or complex needs related to functional status, cognitive ability or social support system  Outcome: Not Progressing

## 2022-10-30 NOTE — PLAN OF CARE
RN resumed care of this patient. Patient her for lack of appetite, and general weakness. Sodium elevated. CT abdomen showing impaction. Patient garbled speech and incomprehensible. RN noted right arm stiffness and tremors with movement. MD made aware of all labs. Fluids changed to T2cgfpk iv continuous. Senna and suppository given. Grey, tarry stools noted, sent for stool occult blood. Heparin on hold. SCD's applied. Turning patient every 2 hours. Patient took some medications crushed then gaged and refused any other foods. RN palpated abdomen and no facial grimacing or voicing pain. MD ordered CT head for further stroke. No urination since shift change. Bladder scan 533ml. stragith cath performed at [phone] and got out 550ml. Will monitor. Patient down for CT of head. Patient can tell me her birthday, maintains eye contact and responds. Sometimes rambles without comprehension. Monitoring. RN had conversation with family about future care. Talks about patients wants and potential hospice. Family grateful for conversation. Will discuss with other family members. 1743- CT called. Patient has an 8mm cerebellar bleed. RN called Yann Oseguera. Awaiting call back.       Problem: Patient Centered Care  Goal: Patient preferences are identified and integrated in the patient's plan of care  Description: Interventions:  - What would you like us to know as we care for you?   - Provide timely, complete, and accurate information to patient/family  - Incorporate patient and family knowledge, values, beliefs, and cultural backgrounds into the planning and delivery of care  - Encourage patient/family to participate in care and decision-making at the level they choose  - Honor patient and family perspectives and choices  10/30/2022 1205 by Keyur Whyte RN  Outcome: Progressing  10/30/2022 1124 by Keyur Whyte RN  Outcome: Progressing     Problem: Patient/Family Goals  Goal: Patient/Family Long Term Goal  Description: Patient's Long Term Goal:     Interventions:  -   - See additional Care Plan goals for specific interventions  10/30/2022 1205 by Barron Altamirano RN  Outcome: Progressing  10/30/2022 1124 by Barron Altamirano RN  Outcome: Progressing  Goal: Patient/Family Short Term Goal  Description: Patient's Short Term Goal:     Interventions:   -   - See additional Care Plan goals for specific interventions  10/30/2022 1205 by Barron Altamirano RN  Outcome: Progressing  10/30/2022 1124 by Barron Altamirano RN  Outcome: Not Progressing     Problem: GASTROINTESTINAL - ADULT  Goal: Maintains adequate nutritional intake (undernourished)  Description: INTERVENTIONS:  - Monitor percentage of each meal consumed  - Identify factors contributing to decreased intake, treat as appropriate  - Assist with meals as needed  - Monitor I&O, WT and lab values  - Obtain nutritional consult as needed  - Optimize oral hygiene and moisture  - Encourage food from home; allow for food preferences  - Enhance eating environment  10/30/2022 1205 by Barron Altamirano RN  Outcome: Progressing  10/30/2022 1124 by Barron Altamirano RN  Outcome: Not Progressing     Problem: Impaired Activities of Daily Living  Goal: Achieve highest/safest level of independence in self care  Description: Interventions:  - Assess ability and encourage patient to participate in ADLs to maximize function  - Promote sitting position while performing ADLs such as feeding, grooming, and bathing  - Educate and encourage patient/family in tolerated functional activity level and precautions during self-care      10/30/2022 1205 by Barron Altamirano RN  Outcome: Progressing  10/30/2022 1124 by Barron Altamirano RN  Outcome: Not Progressing     Problem: Impaired Communication  Goal: Patient will achieve maximal communication potential  Description: Interventions  10/30/2022 1205 by Barron Altamirano RN  Outcome: Progressing  10/30/2022 1124 by Barron Altamirano RN  Outcome: Not Progressing     Problem: SAFETY ADULT - FALL  Goal: Free from fall injury  Description: INTERVENTIONS:  - Assess pt frequently for physical needs  - Identify cognitive and physical deficits and behaviors that affect risk of falls.   - [place] fall precautions as indicated by assessment.  - Educate pt/family on patient safety including physical limitations  - Instruct pt to call for assistance with activity based on assessment  - Modify environment to reduce risk of injury  - Provide assistive devices as appropriate  - Consider OT/PT consult to assist with strengthening/mobility  - Encourage toileting schedule  10/30/2022 1205 by Issa Gamble RN  Outcome: Progressing  10/30/2022 1124 by Issa Gamble RN  Outcome: Not Progressing     Problem: DISCHARGE PLANNING  Goal: Discharge to home or other facility with appropriate resources  Description: INTERVENTIONS:  - Identify barriers to discharge w/pt and caregiver  - Include patient/family/discharge partner in discharge planning  - Arrange for needed discharge resources and transportation as appropriate  - Identify discharge learning needs (meds, wound care, etc)  - Arrange for interpreters to assist at discharge as needed  - Consider post-discharge preferences of patient/family/discharge partner  - Complete POLST form as appropriate  - Assess patient's ability to be responsible for managing their own health  - Refer to Case Management Department for coordinating discharge planning if the patient needs post-hospital services based on physician/LIP order or complex needs related to functional status, cognitive ability or social support system  10/30/2022 1205 by Issa Gamble RN  Outcome: Progressing  10/30/2022 1124 by Issa Gamble RN  Outcome: Not Progressing

## 2022-10-31 ENCOUNTER — APPOINTMENT (OUTPATIENT)
Dept: CT IMAGING | Facility: HOSPITAL | Age: 87
End: 2022-10-31
Attending: Other
Payer: MEDICARE

## 2022-10-31 LAB
AMMONIA PLAS-MCNC: 34 UMOL/L (ref 11–32)
ANION GAP SERPL CALC-SCNC: 8 MMOL/L (ref 0–18)
ANTIBODY SCREEN: NEGATIVE
BASOPHILS # BLD AUTO: 0.02 X10(3) UL (ref 0–0.2)
BASOPHILS NFR BLD AUTO: 0.2 %
BUN BLD-MCNC: 63 MG/DL (ref 7–18)
BUN/CREAT SERPL: 52.5 (ref 10–20)
CALCIUM BLD-MCNC: 8.1 MG/DL (ref 8.5–10.1)
CHLORIDE SERPL-SCNC: 110 MMOL/L (ref 98–112)
CO2 SERPL-SCNC: 20 MMOL/L (ref 21–32)
CREAT BLD-MCNC: 1.2 MG/DL
DEPRECATED RDW RBC AUTO: 66.4 FL (ref 35.1–46.3)
EOSINOPHIL # BLD AUTO: 0.49 X10(3) UL (ref 0–0.7)
EOSINOPHIL NFR BLD AUTO: 4.7 %
ERYTHROCYTE [DISTWIDTH] IN BLOOD BY AUTOMATED COUNT: 17.9 % (ref 11–15)
GFR SERPLBLD BASED ON 1.73 SQ M-ARVRAT: 44 ML/MIN/1.73M2 (ref 60–?)
GLUCOSE BLD-MCNC: 160 MG/DL (ref 70–99)
HCT VFR BLD AUTO: 21.6 %
HGB BLD-MCNC: 6.7 G/DL
HGB BLD-MCNC: 8.9 G/DL
IMM GRANULOCYTES # BLD AUTO: 0.08 X10(3) UL (ref 0–1)
IMM GRANULOCYTES NFR BLD: 0.8 %
LYMPHOCYTES # BLD AUTO: 0.95 X10(3) UL (ref 1–4)
LYMPHOCYTES NFR BLD AUTO: 9.1 %
MAGNESIUM SERPL-MCNC: 2.1 MG/DL (ref 1.6–2.6)
MCH RBC QN AUTO: 32.1 PG (ref 26–34)
MCHC RBC AUTO-ENTMCNC: 31 G/DL (ref 31–37)
MCV RBC AUTO: 103.3 FL
MONOCYTES # BLD AUTO: 0.91 X10(3) UL (ref 0.1–1)
MONOCYTES NFR BLD AUTO: 8.7 %
NEUTROPHILS # BLD AUTO: 8 X10 (3) UL (ref 1.5–7.7)
NEUTROPHILS # BLD AUTO: 8 X10(3) UL (ref 1.5–7.7)
NEUTROPHILS NFR BLD AUTO: 76.5 %
OSMOLALITY SERPL CALC.SUM OF ELEC: 307 MOSM/KG (ref 275–295)
PLATELET # BLD AUTO: 110 10(3)UL (ref 150–450)
POTASSIUM SERPL-SCNC: 3.8 MMOL/L (ref 3.5–5.1)
RBC # BLD AUTO: 2.09 X10(6)UL
RH BLOOD TYPE: NEGATIVE
SODIUM SERPL-SCNC: 138 MMOL/L (ref 136–145)
TSI SER-ACNC: 3.16 MIU/ML (ref 0.36–3.74)
WBC # BLD AUTO: 10.5 X10(3) UL (ref 4–11)

## 2022-10-31 PROCEDURE — 99233 SBSQ HOSP IP/OBS HIGH 50: CPT | Performed by: HOSPITALIST

## 2022-10-31 PROCEDURE — 99232 SBSQ HOSP IP/OBS MODERATE 35: CPT | Performed by: OTHER

## 2022-10-31 PROCEDURE — 70450 CT HEAD/BRAIN W/O DYE: CPT | Performed by: OTHER

## 2022-10-31 RX ORDER — SODIUM CHLORIDE 9 MG/ML
INJECTION, SOLUTION INTRAVENOUS ONCE
Status: COMPLETED | OUTPATIENT
Start: 2022-10-31 | End: 2022-10-31

## 2022-10-31 RX ORDER — ACETAMINOPHEN 10 MG/ML
1000 INJECTION, SOLUTION INTRAVENOUS EVERY 6 HOURS PRN
Status: DISCONTINUED | OUTPATIENT
Start: 2022-10-31 | End: 2022-11-03

## 2022-10-31 RX ORDER — METOPROLOL TARTRATE 5 MG/5ML
2.5 INJECTION INTRAVENOUS ONCE
Status: COMPLETED | OUTPATIENT
Start: 2022-10-31 | End: 2022-10-31

## 2022-10-31 RX ORDER — SODIUM CHLORIDE 9 MG/ML
INJECTION, SOLUTION INTRAVENOUS CONTINUOUS
Status: DISCONTINUED | OUTPATIENT
Start: 2022-10-31 | End: 2022-11-01

## 2022-10-31 RX ORDER — MORPHINE SULFATE 2 MG/ML
1 INJECTION, SOLUTION INTRAMUSCULAR; INTRAVENOUS ONCE
Status: DISCONTINUED | OUTPATIENT
Start: 2022-10-31 | End: 2022-11-03

## 2022-10-31 RX ORDER — POTASSIUM CHLORIDE 14.9 MG/ML
20 INJECTION INTRAVENOUS ONCE
Status: COMPLETED | OUTPATIENT
Start: 2022-10-31 | End: 2022-10-31

## 2022-10-31 RX ORDER — ACETAMINOPHEN 10 MG/ML
1000 INJECTION, SOLUTION INTRAVENOUS ONCE
Status: COMPLETED | OUTPATIENT
Start: 2022-10-31 | End: 2022-10-31

## 2022-10-31 NOTE — CONSULTS
[facility]    PATIENT'S NAME: Susannah SOLOMON   ATTENDING PHYSICIAN: Fifi Barrow MD   CONSULTING PHYSICIAN: Mary Ann Willett MD   PATIENT ACCOUNT#:   [id number]    LOCATION:  [address] [place] [address],[address] #:   [id number]       YOB: 1935  ADMISSION DATE:       10/29/2022      CONSULT DATE:  10/30/2022    REPORT OF CONSULTATION    REASON FOR CONSULTATION:  Dr. [facility] requested a consultation regarding CT finding, confusion. HISTORY OF PRESENT ILLNESS:  I spoke with family members at bedside who state that her mental status, cognitive state at the present time, she is back to her baseline. Patient admitted through the emergency room yesterday with nausea, emesis. PAST MEDICAL HISTORY:  Hypertension, elevated cholesterol, recent left middle cerebral artery ischemic stroke, chronic kidney disease, breast cancer, peptic ulcer disease. PAST SURGICAL HISTORY:  Bilateral mastectomy, cardiac catheterization, hip surgery, hysterectomy, EGD. ALLERGIES:  No known allergies. SOCIAL HISTORY:  Does not smoke, drink alcohol, or use any illegal drugs. REVIEW OF SYSTEMS:  Was not obtainable because of aphasia from recent stroke. PHYSICAL EXAMINATION:    VITAL SIGNS:  As recorded in the chart. Temperature 99.1, pulse 110, respiratory rate 16, blood pressure 153/125, pulse ox 84% and then 92%, and then blood pressure subsequently 117/59. NEUROLOGIC:  Significant expressive and receptive aphasia. Right-sided facial weakness. Moderate right hemiparesis. Strength in the left arm and left leg is normal.  Reflexes are brisk in the right arm and right leg. Right Babinski sign. She does follow some verbal commands. LABORATORY DATA:  Labs reviewed. Although there is no report on the CT of the head in the chart, I did review the CT images revealing a small left cerebellar hemorrhage. Prior CTA of the brain and neck of May, reports reviewed. IMPRESSION:    1. Cerebellar hemorrhage. 2.   Recent middle cerebral artery stroke. With the concern about mental status change, although family members at bedside state she is back to her baseline, with her anemia, we will check ammonia level, thyroid functions. Thank you for the consult.     Dictated By Elmer Burton MD  d: 10/30/2022 23:29:17  t: 10/31/2022 02:26:18  Job [id number]  [facility]/

## 2022-10-31 NOTE — HOME CARE LIAISON
Received referral via [facility]idin for Home Health services. Spoke w/ patient's dtr Christ Begum, requesting to use [facility] (Pt was just discharged from services 10/25). Agency reserved in  [address] and contact information placed on AVS.Financial interest disclosure provided. Notified NADINE/Adonis.

## 2022-10-31 NOTE — SLP NOTE
SLP orders received and acknowledged. SLP attempt this PM. Per RN, pt with reduced LETITIA. SLP to evaluate as pt alert/appropriate. Please contact SLP with any questions, concerns, and/or change in status. Thank you. [facility]  Speech Language Pathologist  Phone Number [phone]

## 2022-10-31 NOTE — PLAN OF CARE
Problem: Patient Centered Care  Goal: Patient preferences are identified and integrated in the patient's plan of care  Description: Interventions:  - What would you like us to know as we care for you?   - Provide timely, complete, and accurate information to patient/family  - Incorporate patient and family knowledge, values, beliefs, and cultural backgrounds into the planning and delivery of care  - Encourage patient/family to participate in care and decision-making at the level they choose  - Honor patient and family perspectives and choices  Outcome: Progressing     Problem: Patient/Family Goals  Goal: Patient/Family Long Term Goal  Description: Patient's Long Term Goal:     Interventions:  -   - See additional Care Plan goals for specific interventions  Outcome: Progressing  Goal: Patient/Family Short Term Goal  Description: Patient's Short Term Goal:     Interventions:   -   - See additional Care Plan goals for specific interventions  Outcome: Progressing     Problem: GASTROINTESTINAL - ADULT  Goal: Maintains adequate nutritional intake (undernourished)  Description: INTERVENTIONS:  - Monitor percentage of each meal consumed  - Identify factors contributing to decreased intake, treat as appropriate  - Assist with meals as needed  - Monitor I&O, WT and lab values  - Obtain nutritional consult as needed  - Optimize oral hygiene and moisture  - Encourage food from home; allow for food preferences  - Enhance eating environment  Outcome: Not Progressing     Problem: Impaired Activities of Daily Living  Goal: Achieve highest/safest level of independence in self care  Description: Interventions:  - Assess ability and encourage patient to participate in ADLs to maximize function  - Promote sitting position while performing ADLs such as feeding, grooming, and bathing  - Educate and encourage patient/family in tolerated functional activity level and precautions during self-care    Outcome: Not Progressing     Problem: Impaired Communication  Goal: Patient will achieve maximal communication potential  Description: Interventions:  Outcome: Progressing     Problem: SAFETY ADULT - FALL  Goal: Free from fall injury  Description: INTERVENTIONS:  - Assess pt frequently for physical needs  - Identify cognitive and physical deficits and behaviors that affect risk of falls. - [place] fall precautions as indicated by assessment.  - Educate pt/family on patient safety including physical limitations  - Instruct pt to call for assistance with activity based on assessment  - Modify environment to reduce risk of injury  - Provide assistive devices as appropriate  - Consider OT/PT consult to assist with strengthening/mobility  - Encourage toileting schedule  Outcome: Progressing     Problem: DISCHARGE PLANNING  Goal: Discharge to home or other facility with appropriate resources  Description: INTERVENTIONS:  - Identify barriers to discharge w/pt and caregiver  - Include patient/family/discharge partner in discharge planning  - Arrange for needed discharge resources and transportation as appropriate  - Identify discharge learning needs (meds, wound care, etc)  - Arrange for interpreters to assist at discharge as needed  - Consider post-discharge preferences of patient/family/discharge partner  - Complete POLST form as appropriate  - Assess patient's ability to be responsible for managing their own health  - Refer to Case Management Department for coordinating discharge planning if the patient needs post-hospital services based on physician/LIP order or complex needs related to functional status, cognitive ability or social support system  Outcome: Progressing     Pt resting in bed, repositioned with assist throughout night. Can be hard to understand. Generalized weakness. Baptiste in place. IVF continued. Remote tele maintained- elevated HR-1 time dose IV metoprolol given overnight-HR improved. Pt refused to eat or drink when asked overnight. Safety measures in place. Frequent rounding being done. Family at bedside in evening. Hospitalist called family about CT results. Neuro consult.

## 2022-10-31 NOTE — CONSULTS
Consult dictated. Consult requested regarding confusion, CT finding. Family members at bedside state that from a mental status, cognitive state, at the present time she is back to her baseline. With anemia we will check ammonia level. Will obtain thyroid functions. Prior stroke. Please see dictation.

## 2022-10-31 NOTE — CM/SW NOTE
10/31/22 1400   CM/SW Referral Data   Referral Source    Reason for Referral Discharge planning   Informant Daughter   Pertinent Medical Hx   Does patient have an established PCP? Yes  Girish Child)   Patient Info   Patient's Current Mental Status at Time of Assessment Confused or unable to complete assessment   Patient's [address]   Patient lives with Other  (24 hr CG)   Patient Status Prior to Admission   Independent with ADLs and Mobility No   Pt. requires assistance with Housework;Driving;Meals; Bathing; Ambulating;Dressing;Medications; Feeding; Toileting;Finances   Services in place prior to admission Other (comment)  (24 hr CG)   Discharge Needs   Anticipated D/C needs Home health care   Choice of Post-Acute Provider   Informed patient of right to choose their preferred provider Yes   List of appropriate post-acute services provided to patient/family with quality data No - Declined list   Patient/family choice [facility]     Pt discussed during nursing rounds. Dx weakness, anemia, confused 2/2 hx CVA. From home w/24 hr CG per daughter. Pt recently was dc'd from [place] [id number] for RN, PT, OT, and ST services. Pt's daughter requesting [place] referral be sent to [facility] for same disciplines. [place] referral sent to [facility], [address] completed. PT/OT/ST nava pending. Plan: Home w/24 hr CG with [facility] pending evals, agency acceptance, and medical clearance. / to remain available for support and/or discharge planning.      LONA Contreras    [phone]

## 2022-10-31 NOTE — DISCHARGE INSTRUCTIONS
Sometimes managing your health at home requires assistance. The [place] team has recognized your preference to use [facility]. They can be reached by phone at [phone]. The fax number for your reference is [phone]. A representative from the home health agency will contact you or your family to schedule your first visit.

## 2022-11-01 PROBLEM — F39 EPISODIC MOOD DISORDER (HCC): Status: ACTIVE | Noted: 2022-11-01

## 2022-11-01 PROBLEM — F48.2 PBA (PSEUDOBULBAR AFFECT): Status: ACTIVE | Noted: 2022-11-01

## 2022-11-01 PROBLEM — F05 DELIRIUM DUE TO ANOTHER MEDICAL CONDITION: Status: ACTIVE | Noted: 2022-11-01

## 2022-11-01 LAB
ANION GAP SERPL CALC-SCNC: 10 MMOL/L (ref 0–18)
BASE EXCESS BLD CALC-SCNC: -7.7 MMOL/L (ref ?–2)
BASOPHILS # BLD AUTO: 0.02 X10(3) UL (ref 0–0.2)
BASOPHILS NFR BLD AUTO: 0.2 %
BLOOD TYPE BARCODE: 9500
BLOOD TYPE BARCODE: 9500
BUN BLD-MCNC: 60 MG/DL (ref 7–18)
BUN/CREAT SERPL: 49.2 (ref 10–20)
CALCIUM BLD-MCNC: 7.8 MG/DL (ref 8.5–10.1)
CHLORIDE SERPL-SCNC: 113 MMOL/L (ref 98–112)
CO2 SERPL-SCNC: 16 MMOL/L (ref 21–32)
CREAT BLD-MCNC: 1.22 MG/DL
DEPRECATED RDW RBC AUTO: 60.9 FL (ref 35.1–46.3)
EOSINOPHIL # BLD AUTO: 0.42 X10(3) UL (ref 0–0.7)
EOSINOPHIL NFR BLD AUTO: 4.2 %
ERYTHROCYTE [DISTWIDTH] IN BLOOD BY AUTOMATED COUNT: 17.4 % (ref 11–15)
GFR SERPLBLD BASED ON 1.73 SQ M-ARVRAT: 43 ML/MIN/1.73M2 (ref 60–?)
GLUCOSE BLD-MCNC: 88 MG/DL (ref 70–99)
HCO3 BLDA-SCNC: 18.9 MEQ/L (ref 21–27)
HCT VFR BLD AUTO: 25.9 %
HGB BLD-MCNC: 8.6 G/DL
IMM GRANULOCYTES # BLD AUTO: 0.09 X10(3) UL (ref 0–1)
IMM GRANULOCYTES NFR BLD: 0.9 %
LYMPHOCYTES # BLD AUTO: 0.81 X10(3) UL (ref 1–4)
LYMPHOCYTES NFR BLD AUTO: 8.1 %
MAGNESIUM SERPL-MCNC: 2 MG/DL (ref 1.6–2.6)
MCH RBC QN AUTO: 32 PG (ref 26–34)
MCHC RBC AUTO-ENTMCNC: 33.2 G/DL (ref 31–37)
MCV RBC AUTO: 96.3 FL
MODIFIED ALLEN TEST: POSITIVE
MONOCYTES # BLD AUTO: 0.81 X10(3) UL (ref 0.1–1)
MONOCYTES NFR BLD AUTO: 8.1 %
NEUTROPHILS # BLD AUTO: 7.81 X10 (3) UL (ref 1.5–7.7)
NEUTROPHILS # BLD AUTO: 7.81 X10(3) UL (ref 1.5–7.7)
NEUTROPHILS NFR BLD AUTO: 78.5 %
O2 CT BLD-SCNC: 11.1 VOL% (ref 15–23)
OSMOLALITY SERPL CALC.SUM OF ELEC: 304 MOSM/KG (ref 275–295)
PCO2 BLDA: 27 MM HG (ref 35–45)
PH BLDA: 7.39 [PH] (ref 7.35–7.45)
PHOSPHATE SERPL-MCNC: 2.1 MG/DL (ref 2.5–4.9)
PLATELET # BLD AUTO: 102 10(3)UL (ref 150–450)
PO2 BLDA: 85 MM HG (ref 80–100)
POTASSIUM SERPL-SCNC: 4.6 MMOL/L (ref 3.5–5.1)
POTASSIUM SERPL-SCNC: 4.6 MMOL/L (ref 3.5–5.1)
PUNCTURE CHARGE: YES
RBC # BLD AUTO: 2.69 X10(6)UL
SAO2 % BLDA: >99 % (ref 94–100)
SODIUM SERPL-SCNC: 139 MMOL/L (ref 136–145)
WBC # BLD AUTO: 10 X10(3) UL (ref 4–11)

## 2022-11-01 PROCEDURE — 99233 SBSQ HOSP IP/OBS HIGH 50: CPT | Performed by: HOSPITALIST

## 2022-11-01 PROCEDURE — 90792 PSYCH DIAG EVAL W/MED SRVCS: CPT | Performed by: OTHER

## 2022-11-01 PROCEDURE — 99232 SBSQ HOSP IP/OBS MODERATE 35: CPT | Performed by: OTHER

## 2022-11-01 RX ORDER — DEXTROMETHORPHAN POLISTIREX 30 MG/5ML
5 SUSPENSION ORAL EVERY 12 HOURS SCHEDULED
Status: DISCONTINUED | OUTPATIENT
Start: 2022-11-01 | End: 2022-11-03

## 2022-11-01 RX ORDER — OLANZAPINE 2.5 MG/1
2.5 TABLET ORAL NIGHTLY
Status: DISCONTINUED | OUTPATIENT
Start: 2022-11-01 | End: 2022-11-03

## 2022-11-01 RX ORDER — METOPROLOL TARTRATE 5 MG/5ML
2.5 INJECTION INTRAVENOUS ONCE
Status: DISCONTINUED | OUTPATIENT
Start: 2022-11-01 | End: 2022-11-03

## 2022-11-01 NOTE — CM/SW NOTE
[facility]    Called and notified [facility] intake. Referral with order sent via [facility] to [facility].      AGA Funk, [facility]    [id number]

## 2022-11-01 NOTE — PLAN OF CARE
PT confused, cries at times. PT stops crying when staff is present at the bedside. Emotional support provided. MD contacted about increased HR. Problem: Patient Centered Care  Goal: Patient preferences are identified and integrated in the patient's plan of care  Description: Interventions:  - What would you like us to know as we care for you? I am on bedrest at home and my family is very involved in my care.    - Provide timely, complete, and accurate information to patient/family  - Incorporate patient and family knowledge, values, beliefs, and cultural backgrounds into the planning and delivery of care  - Encourage patient/family to participate in care and decision-making at the level they choose  - Honor patient and family perspectives and choices  Outcome: Progressing     Problem: Patient/Family Goals  Goal: Patient/Family Long Term Goal  Description: Patient's Long Term Goal: Discharge without complications    Interventions:  - Work with treatment team to develop plan of care  - Utilize medications as appropriate, take prescribed medications as instructed  - Continue following up post discharge as instructed with appropriate physicians   - See additional Care Plan goals for specific interventions  Outcome: Progressing  Goal: Patient/Family Short Term Goal  Description: Patient's Short Term Goal: Pain control    Interventions:   - Work with treatment team to develop pain control plan  - Utilize PRN medications appropriately  - Use non-pharmacological pain control techniques (deep breathing, guided-imagery, heat/ice)  - See additional Care Plan goals for specific interventions  Outcome: Progressing     Problem: GASTROINTESTINAL - ADULT  Goal: Maintains adequate nutritional intake (undernourished)  Description: INTERVENTIONS:  - Monitor percentage of each meal consumed  - Identify factors contributing to decreased intake, treat as appropriate  - Assist with meals as needed  - Monitor I&O, WT and lab values  - Obtain nutritional consult as needed  - Optimize oral hygiene and moisture  - Encourage food from home; allow for food preferences  - Enhance eating environment  Outcome: Progressing     Problem: Impaired Activities of Daily Living  Goal: Achieve highest/safest level of independence in self care  Description: Interventions:  - Assess ability and encourage patient to participate in ADLs to maximize function  - Promote sitting position while performing ADLs such as feeding, grooming, and bathing  - Educate and encourage patient/family in tolerated functional activity level and precautions during self-care    Outcome: Progressing     Problem: Impaired Communication  Goal: Patient will achieve maximal communication potential  Description: Interventions:  Outcome: Progressing     Problem: SAFETY ADULT - FALL  Goal: Free from fall injury  Description: INTERVENTIONS:  - Assess pt frequently for physical needs  - Identify cognitive and physical deficits and behaviors that affect risk of falls.   - [place] fall precautions as indicated by assessment.  - Educate pt/family on patient safety including physical limitations  - Instruct pt to call for assistance with activity based on assessment  - Modify environment to reduce risk of injury  - Provide assistive devices as appropriate  - Consider OT/PT consult to assist with strengthening/mobility  - Encourage toileting schedule  Outcome: Progressing     Problem: DISCHARGE PLANNING  Goal: Discharge to home or other facility with appropriate resources  Description: INTERVENTIONS:  - Identify barriers to discharge w/pt and caregiver  - Include patient/family/discharge partner in discharge planning  - Arrange for needed discharge resources and transportation as appropriate  - Identify discharge learning needs (meds, wound care, etc)  - Arrange for interpreters to assist at discharge as needed  - Consider post-discharge preferences of patient/family/discharge partner  - Complete POLST form as appropriate  - Assess patient's ability to be responsible for managing their own health  - Refer to Case Management Department for coordinating discharge planning if the patient needs post-hospital services based on physician/LIP order or complex needs related to functional status, cognitive ability or social support system  Outcome: Progressing

## 2022-11-01 NOTE — PLAN OF CARE
Patient resting in bed with family at bedside. Minimally responsive to speech, unable to follow directions, more lethargic than yesterday when patient was at baseline per family. Repeat CT ordered by neurology, completed in PM. Hemoglobin critical at 6.7, 1 unit PRBCs transfused. Loss of IV access upon hanging blood, new PIV placed in Right forearm. Repeat hemoglobin ordered per protocol, phlebotomy requiring multiple attempts for result. Pain controlled with PRN Ofirmev x 2. Tele monitor in place. No bowel movement during shift. Baptiste catheter in place with minimal output. Sodium level within normal range, IV fluids changed to normal saline at 50mL/hr. Potassium replaced per protocol. Speech and psychiatry consults ordered for evaluation. Bilateral heel boots in place, SCDs on. Not tolerating PO intake at this time - MD's aware. Call light within reach, fall precautions in place, patient unable to call appropriate, frequent rounding continued. Problem: Patient Centered Care  Goal: Patient preferences are identified and integrated in the patient's plan of care  Description: Interventions:  - What would you like us to know as we care for you? I am on bedrest at home and my family is very involved in my care.    - Provide timely, complete, and accurate information to patient/family  - Incorporate patient and family knowledge, values, beliefs, and cultural backgrounds into the planning and delivery of care  - Encourage patient/family to participate in care and decision-making at the level they choose  - Honor patient and family perspectives and choices  Outcome: Not Progressing     Problem: Patient/Family Goals  Goal: Patient/Family Long Term Goal  Description: Patient's Long Term Goal: Discharge without complications    Interventions:  - Work with treatment team to develop plan of care  - Utilize medications as appropriate, take prescribed medications as instructed  - Continue following up post discharge as instructed with appropriate physicians   - See additional Care Plan goals for specific interventions  Outcome: Not Progressing  Goal: Patient/Family Short Term Goal  Description: Patient's Short Term Goal: Pain control    Interventions:   - Work with treatment team to develop pain control plan  - Utilize PRN medications appropriately  - Use non-pharmacological pain control techniques (deep breathing, guided-imagery, heat/ice)  - See additional Care Plan goals for specific interventions  Outcome: Not Progressing     Problem: GASTROINTESTINAL - ADULT  Goal: Maintains adequate nutritional intake (undernourished)  Description: INTERVENTIONS:  - Monitor percentage of each meal consumed  - Identify factors contributing to decreased intake, treat as appropriate  - Assist with meals as needed  - Monitor I&O, WT and lab values  - Obtain nutritional consult as needed  - Optimize oral hygiene and moisture  - Encourage food from home; allow for food preferences  - Enhance eating environment  Outcome: Not Progressing     Problem: Impaired Activities of Daily Living  Goal: Achieve highest/safest level of independence in self care  Description: Interventions:  - Assess ability and encourage patient to participate in ADLs to maximize function  - Promote sitting position while performing ADLs such as feeding, grooming, and bathing  - Educate and encourage patient/family in tolerated functional activity level and precautions during self-care  Outcome: Not Progressing     Problem: Impaired Communication  Goal: Patient will achieve maximal communication potential  Description: Interventions:  Outcome: Not Progressing     Problem: SAFETY ADULT - FALL  Goal: Free from fall injury  Description: INTERVENTIONS:  - Assess pt frequently for physical needs  - Identify cognitive and physical deficits and behaviors that affect risk of falls.   - [place] fall precautions as indicated by assessment.  - Educate pt/family on patient safety including physical limitations  - Instruct pt to call for assistance with activity based on assessment  - Modify environment to reduce risk of injury  - Provide assistive devices as appropriate  - Consider OT/PT consult to assist with strengthening/mobility  - Encourage toileting schedule  Outcome: Not Progressing     Problem: DISCHARGE PLANNING  Goal: Discharge to home or other facility with appropriate resources  Description: INTERVENTIONS:  - Identify barriers to discharge w/pt and caregiver  - Include patient/family/discharge partner in discharge planning  - Arrange for needed discharge resources and transportation as appropriate  - Identify discharge learning needs (meds, wound care, etc)  - Arrange for interpreters to assist at discharge as needed  - Consider post-discharge preferences of patient/family/discharge partner  - Complete POLST form as appropriate  - Assess patient's ability to be responsible for managing their own health  - Refer to Case Management Department for coordinating discharge planning if the patient needs post-hospital services based on physician/LIP order or complex needs related to functional status, cognitive ability or social support system  Outcome: Not Progressing

## 2022-11-01 NOTE — PROGRESS NOTES
11/01/22 1142   Clinical Encounter Type   Visited With Patient and family together   Routine Visit Introduction   Referral From Nurse   Referral To    Taxonomy   Intended Effects Convey a calming presence   Methods Offer emotional support   Interventions Identify supportive relationship(s); Active listening;Discuss coping mechanism with someone       Discussion: Received verbal referral from YAMILETH Schwab to visit pt/ family on  rounds. Pt appeared to be resting comfortably.  offered emotional support to pt's daughters. They described their reliance on shen, support of each other, and acceptance of pt's current status. They deny needs for  to address at this time.  follow-up visit available prn and can be contacted at [id number].     Allegra Dickens, Chaplain Resident

## 2022-11-02 LAB
ANION GAP SERPL CALC-SCNC: 9 MMOL/L (ref 0–18)
BASOPHILS # BLD AUTO: 0.02 X10(3) UL (ref 0–0.2)
BASOPHILS NFR BLD AUTO: 0.2 %
BUN BLD-MCNC: 53 MG/DL (ref 7–18)
BUN/CREAT SERPL: 47.3 (ref 10–20)
CALCIUM BLD-MCNC: 7.9 MG/DL (ref 8.5–10.1)
CHLORIDE SERPL-SCNC: 120 MMOL/L (ref 98–112)
CO2 SERPL-SCNC: 16 MMOL/L (ref 21–32)
CREAT BLD-MCNC: 1.12 MG/DL
DEPRECATED RDW RBC AUTO: 63.9 FL (ref 35.1–46.3)
EOSINOPHIL # BLD AUTO: 0.3 X10(3) UL (ref 0–0.7)
EOSINOPHIL NFR BLD AUTO: 3.2 %
ERYTHROCYTE [DISTWIDTH] IN BLOOD BY AUTOMATED COUNT: 17.4 % (ref 11–15)
GFR SERPLBLD BASED ON 1.73 SQ M-ARVRAT: 48 ML/MIN/1.73M2 (ref 60–?)
GLUCOSE BLD-MCNC: 72 MG/DL (ref 70–99)
HCT VFR BLD AUTO: 27.8 %
HGB BLD-MCNC: 8.8 G/DL
IMM GRANULOCYTES # BLD AUTO: 0.06 X10(3) UL (ref 0–1)
IMM GRANULOCYTES NFR BLD: 0.6 %
LYMPHOCYTES # BLD AUTO: 0.75 X10(3) UL (ref 1–4)
LYMPHOCYTES NFR BLD AUTO: 7.9 %
MAGNESIUM SERPL-MCNC: 2.2 MG/DL (ref 1.6–2.6)
MCH RBC QN AUTO: 31.5 PG (ref 26–34)
MCHC RBC AUTO-ENTMCNC: 31.7 G/DL (ref 31–37)
MCV RBC AUTO: 99.6 FL
MONOCYTES # BLD AUTO: 0.72 X10(3) UL (ref 0.1–1)
MONOCYTES NFR BLD AUTO: 7.6 %
NEUTROPHILS # BLD AUTO: 7.66 X10 (3) UL (ref 1.5–7.7)
NEUTROPHILS # BLD AUTO: 7.66 X10(3) UL (ref 1.5–7.7)
NEUTROPHILS NFR BLD AUTO: 80.5 %
OSMOLALITY SERPL CALC.SUM OF ELEC: 313 MOSM/KG (ref 275–295)
PHOSPHATE SERPL-MCNC: 2.9 MG/DL (ref 2.5–4.9)
PLATELET # BLD AUTO: 91 10(3)UL (ref 150–450)
POTASSIUM SERPL-SCNC: 4.2 MMOL/L (ref 3.5–5.1)
RBC # BLD AUTO: 2.79 X10(6)UL
SODIUM SERPL-SCNC: 145 MMOL/L (ref 136–145)
WBC # BLD AUTO: 9.5 X10(3) UL (ref 4–11)

## 2022-11-02 PROCEDURE — 99233 SBSQ HOSP IP/OBS HIGH 50: CPT | Performed by: INTERNAL MEDICINE

## 2022-11-02 PROCEDURE — 99233 SBSQ HOSP IP/OBS HIGH 50: CPT | Performed by: OTHER

## 2022-11-02 PROCEDURE — 99497 ADVNCD CARE PLAN 30 MIN: CPT | Performed by: INTERNAL MEDICINE

## 2022-11-02 RX ORDER — SCOLOPAMINE TRANSDERMAL SYSTEM 1 MG/1
1 PATCH, EXTENDED RELEASE TRANSDERMAL
Status: DISCONTINUED | OUTPATIENT
Start: 2022-11-02 | End: 2022-11-03

## 2022-11-02 RX ORDER — MORPHINE SULFATE 2 MG/ML
1 INJECTION, SOLUTION INTRAMUSCULAR; INTRAVENOUS EVERY 2 HOUR PRN
Status: DISCONTINUED | OUTPATIENT
Start: 2022-11-02 | End: 2022-11-03

## 2022-11-02 RX ORDER — MORPHINE SULFATE 4 MG/ML
4 INJECTION, SOLUTION INTRAMUSCULAR; INTRAVENOUS EVERY 2 HOUR PRN
Status: DISCONTINUED | OUTPATIENT
Start: 2022-11-02 | End: 2022-11-03

## 2022-11-02 RX ORDER — MORPHINE SULFATE 2 MG/ML
2 INJECTION, SOLUTION INTRAMUSCULAR; INTRAVENOUS EVERY 2 HOUR PRN
Status: DISCONTINUED | OUTPATIENT
Start: 2022-11-02 | End: 2022-11-03

## 2022-11-02 RX ORDER — METOPROLOL TARTRATE 5 MG/5ML
5 INJECTION INTRAVENOUS EVERY 6 HOURS PRN
Status: DISCONTINUED | OUTPATIENT
Start: 2022-11-02 | End: 2022-11-03

## 2022-11-02 NOTE — CM/SW NOTE
Received message from MD that family prefers to use [facility] for home hospice care. THIERRY called and spoke with daughter, Jenny Gallego. Jenny Gallego confirmed that they would like to work with [facility]. SW notified Jenny Dulceyanni that SW will send referral to [facility], cannot guarantee that they will be able to accept. Jenny Gallego expressed understanding. THIERRY called and spoke with [facility] [phone]. They report that they will review referral and call SW back. Requested clinicals/hospice order to be manually faxed [phone]. SW faxed referral to [facility], requested response asap. PLAN: Referral pending to [facility].        AGA St, [facility]    [id number]

## 2022-11-02 NOTE — PLAN OF CARE
Problem: Patient Centered Care  Goal: Patient preferences are identified and integrated in the patient's plan of care  Description: Interventions:  - What would you like us to know as we care for you? I am on bedrest at home and my family is very involved in my care.    - Provide timely, complete, and accurate information to patient/family  - Incorporate patient and family knowledge, values, beliefs, and cultural backgrounds into the planning and delivery of care  - Encourage patient/family to participate in care and decision-making at the level they choose  - Honor patient and family perspectives and choices  Outcome: Progressing     Problem: Patient/Family Goals  Goal: Patient/Family Long Term Goal  Description: Patient's Long Term Goal: Discharge without complications    Interventions:  - Work with treatment team to develop plan of care  - Utilize medications as appropriate, take prescribed medications as instructed  - Continue following up post discharge as instructed with appropriate physicians   - See additional Care Plan goals for specific interventions  Outcome: Progressing  Goal: Patient/Family Short Term Goal  Description: Patient's Short Term Goal: Pain control    Interventions:   - Work with treatment team to develop pain control plan  - Utilize PRN medications appropriately  - Use non-pharmacological pain control techniques (deep breathing, guided-imagery, heat/ice)  - See additional Care Plan goals for specific interventions  Outcome: Progressing     Problem: GASTROINTESTINAL - ADULT  Goal: Maintains adequate nutritional intake (undernourished)  Description: INTERVENTIONS:  - Monitor percentage of each meal consumed  - Identify factors contributing to decreased intake, treat as appropriate  - Assist with meals as needed  - Monitor I&O, WT and lab values  - Obtain nutritional consult as needed  - Optimize oral hygiene and moisture  - Encourage food from home; allow for food preferences  - Enhance eating environment  Outcome: Progressing     Problem: Impaired Activities of Daily Living  Goal: Achieve highest/safest level of independence in self care  Description: Interventions:  - Assess ability and encourage patient to participate in ADLs to maximize function  - Promote sitting position while performing ADLs such as feeding, grooming, and bathing  - Educate and encourage patient/family in tolerated functional activity level and precautions during self-care    Outcome: Progressing     Problem: Impaired Communication  Goal: Patient will achieve maximal communication potential  Description: Interventions:    Outcome: Progressing     Problem: SAFETY ADULT - FALL  Goal: Free from fall injury  Description: INTERVENTIONS:  - Assess pt frequently for physical needs  - Identify cognitive and physical deficits and behaviors that affect risk of falls.   - [place] fall precautions as indicated by assessment.  - Educate pt/family on patient safety including physical limitations  - Instruct pt to call for assistance with activity based on assessment  - Modify environment to reduce risk of injury  - Provide assistive devices as appropriate  - Consider OT/PT consult to assist with strengthening/mobility  - Encourage toileting schedule  Outcome: Progressing     Problem: DISCHARGE PLANNING  Goal: Discharge to home or other facility with appropriate resources  Description: INTERVENTIONS:  - Identify barriers to discharge w/pt and caregiver  - Include patient/family/discharge partner in discharge planning  - Arrange for needed discharge resources and transportation as appropriate  - Identify discharge learning needs (meds, wound care, etc)  - Arrange for interpreters to assist at discharge as needed  - Consider post-discharge preferences of patient/family/discharge partner  - Complete POLST form as appropriate  - Assess patient's ability to be responsible for managing their own health  - Refer to Case Management Department for coordinating discharge planning if the patient needs post-hospital services based on physician/LIP order or complex needs related to functional status, cognitive ability or social support system  Outcome: Inez Rey is resting comfortably in bed. Alert and oriented x 1-2, drowsy. Room air. Tylenol given for noted discomfortt after bath. Due to decreased mental status, unable to safely tolerate PO intake, therefore medications were not given. Baptiste in place, output WNL. Incont of stool, prompt incont care completed. Safety measures in place, call light within reach, will continue to monitor.

## 2022-11-02 NOTE — PROGRESS NOTES
[facility] informed by , Alanna Joy, family is choosing another hospice company. Patient to be re referred.      Toya Lowery  Residential Liaison  [id number]

## 2022-11-02 NOTE — PLAN OF CARE
Patient more alert today, answering some questions appropriately. Vital signs stable on room air. 1 episode of emesis in the AM, relieved by Zofran. Pain controlled with PRN Ofirmev. Baptiste in place. Tolerating minimal bites of liquids. Speech consult completed. Hospice consult ordered for informational purposes. Fluids discontinued. Suppository given x 1 for relief of stool burden. 1 substantial, formed bowel movement in PM. Call light within reach, fall precautions in place, patient calling appropriately. Family at bedside. Call light within reach, fall precautions in place, patient unable to call appropriately, frequent rounding continued. Problem: Patient Centered Care  Goal: Patient preferences are identified and integrated in the patient's plan of care  Description: Interventions:  - What would you like us to know as we care for you? I am on bedrest at home and my family is very involved in my care.    - Provide timely, complete, and accurate information to patient/family  - Incorporate patient and family knowledge, values, beliefs, and cultural backgrounds into the planning and delivery of care  - Encourage patient/family to participate in care and decision-making at the level they choose  - Honor patient and family perspectives and choices  Outcome: Progressing     Problem: Patient/Family Goals  Goal: Patient/Family Long Term Goal  Description: Patient's Long Term Goal: Discharge without complications    Interventions:  - Work with treatment team to develop plan of care  - Utilize medications as appropriate, take prescribed medications as instructed  - Continue following up post discharge as instructed with appropriate physicians   - See additional Care Plan goals for specific interventions  Outcome: Progressing  Goal: Patient/Family Short Term Goal  Description: Patient's Short Term Goal: Pain control    Interventions:   - Work with treatment team to develop pain control plan  - Utilize PRN medications appropriately  - Use non-pharmacological pain control techniques (deep breathing, guided-imagery, heat/ice)  - See additional Care Plan goals for specific interventions  Outcome: Progressing     Problem: GASTROINTESTINAL - ADULT  Goal: Maintains adequate nutritional intake (undernourished)  Description: INTERVENTIONS:  - Monitor percentage of each meal consumed  - Identify factors contributing to decreased intake, treat as appropriate  - Assist with meals as needed  - Monitor I&O, WT and lab values  - Obtain nutritional consult as needed  - Optimize oral hygiene and moisture  - Encourage food from home; allow for food preferences  - Enhance eating environment  Outcome: Progressing     Problem: Impaired Activities of Daily Living  Goal: Achieve highest/safest level of independence in self care  Description: Interventions:  - Assess ability and encourage patient to participate in ADLs to maximize function  - Promote sitting position while performing ADLs such as feeding, grooming, and bathing  - Educate and encourage patient/family in tolerated functional activity level and precautions during self-care  - Provide support under elbow of weak side to prevent shoulder subluxation  Outcome: Progressing     Problem: Impaired Communication  Goal: Patient will achieve maximal communication potential  Description: Interventions:  - Allow additional time for processing after asking questions or providing instructions  - Ask \"yes/no\" questions to facilitate patient's ability to communicate wants and needs  Outcome: Progressing     Problem: SAFETY ADULT - FALL  Goal: Free from fall injury  Description: INTERVENTIONS:  - Assess pt frequently for physical needs  - Identify cognitive and physical deficits and behaviors that affect risk of falls.   - [place] fall precautions as indicated by assessment.  - Educate pt/family on patient safety including physical limitations  - Instruct pt to call for assistance with activity based on assessment  - Modify environment to reduce risk of injury  - Provide assistive devices as appropriate  - Consider OT/PT consult to assist with strengthening/mobility  - Encourage toileting schedule  Outcome: Progressing     Problem: DISCHARGE PLANNING  Goal: Discharge to home or other facility with appropriate resources  Description: INTERVENTIONS:  - Identify barriers to discharge w/pt and caregiver  - Include patient/family/discharge partner in discharge planning  - Arrange for needed discharge resources and transportation as appropriate  - Identify discharge learning needs (meds, wound care, etc)  - Arrange for interpreters to assist at discharge as needed  - Consider post-discharge preferences of patient/family/discharge partner  - Complete POLST form as appropriate  - Assess patient's ability to be responsible for managing their own health  - Refer to Case Management Department for coordinating discharge planning if the patient needs post-hospital services based on physician/LIP order or complex needs related to functional status, cognitive ability or social support system  Outcome: Progressing

## 2022-11-02 NOTE — DIETARY NOTE
Brief Nutrition Note:    Pt was screened at nutritional risk at admission per RN due to unintentional wt loss and eating poorly due to decreased appetite. Pt was following a pureed diet with mildly thick liquids at home PTA. SLP saw pt on 11/1 and pureed with NTL was recommended. Hospice consult ordered and met with daughter Carlos Marino on 11/1. Daughter Carlos Marino asked for follow up today because she wanted to talk to her siblings and make a decision. No updates at this time on hospice plans. RN reports that pt is too lethargic to eat. Defer nutritional assessment at this time until [address] known. Follow up tomorrow regarding hospice decision.       [address], [address] [facility] Dietitian [id number]

## 2022-11-02 NOTE — PLAN OF CARE
Drowsy, NIURKA orientation level r/t expressive aphagia, incomprehensible speech. Does not follow commands. Max assist. Baptiste in place. No nonverbal signs of pain noted. Unable to safety tolerate PO intake, intermittently coughing/gagging with secretions. Scopolamine patch applied behind left ear. Zofran given per family request. Many family members at bedside, have decided to pursue hospice with [facility]. Bed in lowest position, call light in reach, frequent rounding, nonskid footwear, fall precautions in place.

## 2022-11-03 VITALS
OXYGEN SATURATION: 100 % | HEART RATE: 87 BPM | DIASTOLIC BLOOD PRESSURE: 60 MMHG | RESPIRATION RATE: 18 BRPM | SYSTOLIC BLOOD PRESSURE: 147 MMHG | WEIGHT: 113.88 LBS | TEMPERATURE: 98 F | BODY MASS INDEX: 21 KG/M2

## 2022-11-03 PROCEDURE — 99239 HOSP IP/OBS DSCHRG MGMT >30: CPT | Performed by: INTERNAL MEDICINE

## 2022-11-03 NOTE — CM/SW NOTE
Called and LVM for [facility] [phone] x2 this morning requesting a call back to discuss referral that was faxed to them last night. 1153am  Spoke with Wanda Hutson at [facility] hospice- confirmed supplies will be delivered to home between 1-3p. Patient will need ambulance home. SW called and spoke with daughter, Mylene Toure. Mylene Toure requesting ambulance for 3pm, will call SW if there are any issues with equipment delivery. PLAN: Home w [facility] eta is 3pm. PCS DONE.      AGA Eckert, [facility]    [id number]

## 2022-11-03 NOTE — PLAN OF CARE
No acute changes overnight. Accompanies by family members at bedside during evening hours. IV morphine given for pain relief. Dr. Tori Sweet notified of increased HR; orders for PRN IV Metoprolol received. Resting overnight. Plan for discharge with home hospice pending clearance. Problem: Patient Centered Care  Goal: Patient preferences are identified and integrated in the patient's plan of care  Description: Interventions:  - What would you like us to know as we care for you? I am on bedrest at home and my family is very involved in my care.    - Provide timely, complete, and accurate information to patient/family  - Incorporate patient and family knowledge, values, beliefs, and cultural backgrounds into the planning and delivery of care  - Encourage patient/family to participate in care and decision-making at the level they choose  - Honor patient and family perspectives and choices  Outcome: Progressing     Problem: Patient/Family Goals  Goal: Patient/Family Long Term Goal  Description: Patient's Long Term Goal: Discharge without complications    Interventions:  - Work with treatment team to develop plan of care  - Utilize medications as appropriate, take prescribed medications as instructed  - Continue following up post discharge as instructed with appropriate physicians   - See additional Care Plan goals for specific interventions  Outcome: Progressing  Goal: Patient/Family Short Term Goal  Description: Patient's Short Term Goal: Pain control    Interventions:   - Work with treatment team to develop pain control plan  - Utilize PRN medications appropriately  - Use non-pharmacological pain control techniques (deep breathing, guided-imagery, heat/ice)  - See additional Care Plan goals for specific interventions  Outcome: Progressing     Problem: GASTROINTESTINAL - ADULT  Goal: Maintains adequate nutritional intake (undernourished)  Description: INTERVENTIONS:  - Monitor percentage of each meal consumed  - Identify factors contributing to decreased intake, treat as appropriate  - Assist with meals as needed  - Monitor I&O, WT and lab values  - Obtain nutritional consult as needed  - Optimize oral hygiene and moisture  - Encourage food from home; allow for food preferences  - Enhance eating environment  Outcome: Progressing     Problem: Impaired Activities of Daily Living  Goal: Achieve highest/safest level of independence in self care  Description: Interventions:  - Assess ability and encourage patient to participate in ADLs to maximize function  - Promote sitting position while performing ADLs such as feeding, grooming, and bathing  - Educate and encourage patient/family in tolerated functional activity level and precautions during self-care  Outcome: Progressing     Problem: SAFETY ADULT - FALL  Goal: Free from fall injury  Description: INTERVENTIONS:  - Assess pt frequently for physical needs  - Identify cognitive and physical deficits and behaviors that affect risk of falls.   - [place] fall precautions as indicated by assessment.  - Educate pt/family on patient safety including physical limitations  - Instruct pt to call for assistance with activity based on assessment  - Modify environment to reduce risk of injury  - Provide assistive devices as appropriate  - Consider OT/PT consult to assist with strengthening/mobility  - Encourage toileting schedule  Outcome: Progressing     Problem: DISCHARGE PLANNING  Goal: Discharge to home or other facility with appropriate resources  Description: INTERVENTIONS:  - Identify barriers to discharge w/pt and caregiver  - Include patient/family/discharge partner in discharge planning  - Arrange for needed discharge resources and transportation as appropriate  - Identify discharge learning needs (meds, wound care, etc)  - Arrange for interpreters to assist at discharge as needed  - Consider post-discharge preferences of patient/family/discharge partner  - Complete POLST form as appropriate  - Assess patient's ability to be responsible for managing their own health  - Refer to Case Management Department for coordinating discharge planning if the patient needs post-hospital services based on physician/LIP order or complex needs related to functional status, cognitive ability or social support system  Outcome: Progressing

## 2022-11-03 NOTE — PLAN OF CARE
Drowsy, NIURKA orientation level r/t expressive aphagia, incomprehensible speech. Does not follow commands. Max assist. Baptiste in place. Morphine and tylenol given for pain. Unable to safety tolerate PO intake, intermittently coughing/gagging with secretions. Second scopolamine patch applied behind right ear. Bed in lowest position, call light in reach, frequent rounding, nonskid footwear, fall precautions in place. Plan to discharge home today with hospice, ambulance to  pt at [facility] called this RN stating they are running 60-90 minutes late. ETA 1630. Family at bedside and updated.

## 2023-02-06 NOTE — CM/SW NOTE
73M PMH HIV, HTN, HLD, DM, and recently diagnosed Dermatomyositis who presented 2/1 with dysphagia and muscle weakness, admitted to neurology for further evaluation. Medicine consulted for preop clearance for deltoid muscle biopsy.    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 (Asymmetric thickening of the interventricular septum up to 2.0 cm thickness as compared to the lateral wall left ventricle)  - TTE showing EF>75%, grade II LV diastolic dysfunction and PASP 43 mmHg, aortic sclerosis, mild MR   - cardiology evaluation ( can be outpt) for further evaluation and management   - METS at least 4  - no contraindication for low risk procedure: Deltoid biopsy by NSGY scheduled on Monday 2/6 in OR   - NPO except meds after MN/Preop labs per NSGY, hold VTE ppx     # Transaminitis   # elevated CPK  # Dermatomyositis   - s/p L deltoid muscle biopsy ( 2/6) f/u Path  - elevated AST/ALT likely 2/2 Dermatomyositis, HCV NR, avoid statin   - CPK<5000, no need for IVF hydration   - awaiting Deltoid biopsy on Monday 2/6  - c/w Prednisone 50mg-> 60mg po daily ( 2/4-), for Cefazolin x 2 doses for postop abx ppx   - added Famotidine 20mg daily for GI ppx   - added Bactrim DS 1 tab daily for PCP ppx if pt required prolonged steroid ( i.e. Prednisone 20mg daily for more than 4 weeks benji being at high risk group i.e. HIV and Dermatomyositis, may d/c PCP ppx when Prednisone dose was decreased to 15mg per day or less)   - noted gallbladder cyst Vs polyp 11mm, GI eval given Dermatomyositis can be paraneoplastic syndrome. increased risk of malignancy     #Vit B12 deficiency  - Y29=719 (Borderline low)  - check MMA and homocysteine,and replete Vit B12 preferred 1000mg IM daily inpatient, may send home on Vit B 12 1000mg po daily       Thank you for the consult, Med consult team continues to follow with you.        The pt. Is scheduled to discharge to [facility] today 4/14 at 3p, via 2025 [email or web address]. The pt. And her family are aware of medicar costs and discharge. The pt.  Is also aware she is going into a semi private room    PCS is complete in Epic, RN to print with AVS.     Report [phone]  [address]

## 2023-08-24 NOTE — IP AVS SNAPSHOT
[facility]            (For Outpatient Use Only) Initial Admit Date: 10/29/2022   Inpt/Obs Admit Date: Inpt: 10/29/22 / Obs: N/A   Discharge Date:    Linnea Le:  [de-identified]   MRN: [de-identified]   CSN: [id number]   CEID: [id number]        ENCOUNTER  Patient Class: Inpatient Admitting Provider: Tatiana Borges MD Unit: [address] [place] [address]//SE   Hospital Service: Oncology Attending Provider: Ibrahima Baca MD   Bed: 471-A   Visit Type:   Referring Physician: No ref. provider found Billing Flag:    Admit Diagnosis: Dehydration [E86.0]      PATIENT  Legal Name:   Wilbert Sanders   Legal Sex: Female  Gender ID:              [address] [facility] Name:    PCP:  Trudy Marti MD Home: [phone]   Address:  [place] [address] : 6/15/1935 (87 yrs) Mobile: [phone]         City/State/Zip: [place], [address] [place] [address] Marital:  Language: [place] park: Kendrick SSN4: [id number] Protestant: [address] Not [facility]*     Race: White Ethnicity: Non  Or [address] [place] [address]   Name Relationship Legal Guardian? Home Phone Work Phone Mobile Phone   1. Manjula Munroe  2.  [facility] Daughter  Daughter      [phone]    [phone]     GUARANTOR  Guarantor: Colie Mcardle : 6/15/1935 Home Phone: [phone]   Address: [place] [address]  Sex: Female Work Phone:    City/State/Zip: [place], [address] [place] [address]   Rel. to Patient: Self Guarantor ID: [id number]   [address]   Employer:  Status: RETIRED     COVERAGE  PRIMARY INSURANCE   Payor: MEDICARE Plan: MEDICARE PART A&B   Group Number:  Insurance Type: INDEMNITY   Subscriber Name: Colie Mcardle Subscriber : 06/15/1935   Subscriber ID: [id number] Pt Rel to Subscriber: Self   SECONDARY INSURANCE   [place]   Group Number: [id number] Insurance Type: [address] Name: Colie Mcardle Subscriber : 6/15/1935   Subscriber ID: [id number] Pt Rel to Subscriber: SELF   TERTIARY INSURANCE   Payor:  Plan:    Group Number:  Insurance Type:    Subscriber Name:  Subscriber :    Subscriber ID:  Pt Rel to Subscriber:    Hospital Account Financial Class: Medicare    November 3, 2022 Consent: Written consent was obtained and risks were reviewed including but not limited to scarring, infection, bleeding, scabbing, incomplete removal, nerve damage and allergy to anesthesia.

## 2023-12-26 NOTE — PLAN OF CARE
Patient discharged to [facility]. Report given to [facility]. Patient reports feeling, \"Less weak. \" David Mo, patient's daughter, at bedside and updated on discharge planning. Peripheral IV removed. Ambulating with walker and assistance. Paperwork sent and patient transported by K[facility]. right foot right knee right knee abrasion close up

## (undated) DIAGNOSIS — M35.3 POLYMYALGIA RHEUMATICA (HCC): ICD-10-CM

## (undated) DIAGNOSIS — R13.10 DYSPHAGIA, UNSPECIFIED TYPE: ICD-10-CM

## (undated) DIAGNOSIS — N18.9 CHRONIC KIDNEY DISEASE, UNSPECIFIED CKD STAGE: ICD-10-CM

## (undated) DIAGNOSIS — N18.32 STAGE 3B CHRONIC KIDNEY DISEASE (HCC): ICD-10-CM

## (undated) DIAGNOSIS — R30.0 DYSURIA: ICD-10-CM

## (undated) DIAGNOSIS — R53.1 WEAKNESS: ICD-10-CM

## (undated) DIAGNOSIS — D64.9 ANEMIA, UNSPECIFIED: ICD-10-CM

## (undated) DIAGNOSIS — N18.31 STAGE 3A CHRONIC KIDNEY DISEASE (HCC): ICD-10-CM

## (undated) DIAGNOSIS — D50.0 IRON DEFICIENCY ANEMIA DUE TO CHRONIC BLOOD LOSS: Primary | ICD-10-CM

## (undated) DIAGNOSIS — D63.8 ANEMIA OF CHRONIC DISEASE: Primary | ICD-10-CM

## (undated) DIAGNOSIS — D64.9 ANEMIA, UNSPECIFIED TYPE: ICD-10-CM

## (undated) DEVICE — Device: Brand: DEFENDO AIR/WATER/SUCTION AND BIOPSY VALVE

## (undated) DEVICE — FORCEP RADIAL JAW 4

## (undated) DEVICE — ENDOSCOPY PACK UPPER: Brand: MEDLINE INDUSTRIES, INC.

## (undated) DEVICE — ENDOSCOPY PACK - LOWER: Brand: MEDLINE INDUSTRIES, INC.

## (undated) NOTE — IP AVS SNAPSHOT
[facility]            (For Outpatient Use Only) Initial Admit Date: 2022   Inpt/Obs Admit Date: Inpt: 22 / Obs: 22   Discharge Date:    Samira Lamb:  [de-identified]   MRN: [de-identified]   CSN: [id number]   CEID: [id number]        ENCOUNTER  Patient Class: Observation Admitting Provider: Marifer Lafleur MD Unit: [address] Service: Medical Attending Provider: Marifer Lafleur MD   Bed: 540-A   Visit Type:   Referring Physician: No ref. provider found Billing Flag:    Admit Diagnosis: Dehydration [E86.0]      PATIENT  Legal Name:   Darcy Irizarry   Legal Sex: Female  Gender ID:              [address] [facility] Floor Name:    PCP:  Samia Kelly MD Home: [phone]   Address:  [place] [address] : 6/15/1935 (87 yrs) Mobile: [phone]         City/State/Zip: [address] Marital:  Language: [place] park: Kendrick SSN4: [id number] Nondenominational: [address] Not Davee Nura*     Race: White Ethnicity: Non  Or [address] [place] [address]   Name Relationship Legal Guardian? Home Phone Work Phone Mobile Phone   1. Manjula Munroe  2.  [facility] Daughter  Daughter      [phone]    [phone]     GUARANTOR  Guarantor: Jamaica Kamara : 6/15/1935 Home Phone: [phone]   Address: [place] [address]  Sex: Female Work Phone:    City/State/Zip: [facility]   Rel. to Patient: Self Guarantor ID: [id number]   [address]   Employer:  Status: RETIRED     COVERAGE  PRIMARY INSURANCE   Payor: MEDICARE Plan: MEDICARE PART A&B   Group Number:  Insurance Type: INDEMNITY   Subscriber Name: Jamaica Kamara Subscriber : 06/15/1935   Subscriber ID: [id number] Pt Rel to Subscriber: Self   SECONDARY INSURANCE   [place]   Group Number: [id number] Insurance Type: [address] Name: Jamaica Kamara Subscriber : 6/15/1935   Subscriber ID: [id number] Pt Rel to Subscriber: SELF TERTIARY INSURANCE   Payor:  Plan:    Group Number:  Insurance Type:    Subscriber Name:  Subscriber :    Subscriber ID:  Pt Rel to Subscriber:    Hospital Account Financial Class: Medicare    2022

## (undated) NOTE — LETTER
[facility]  Administration of Anesthesia  1. I, Karlos Tinsley, or _________________________________ acting on her behalf, (Patient) (Dependent/Representative) request to receive anesthesia for my pending procedure/operation/treatment. infections, high spinal block, spinal bleeding, seizure, cardiac arrest and death. 7. AWARENESS: I understand that it is possible (but unlikely) to have explicit memory of events from the operating room while under general anesthesia.   8. ELECTROCONVULSIV (Date) (Time)                                                                                               (Responsible person in case of minor/ unconscious pt) /Relationship    My signature below affirms that prior to the time of the procedure, I have ex

## (undated) NOTE — IP AVS SNAPSHOT
Patient Demographics     Address Phone E-mail Address    [phone] (Home) [email or web address]      Emergency Contact(s)     Name Relation Home Work [facility] [employer] [place] Daughter   Dickson Ford Take 25 mg by mouth 2 (two) times daily. predniSONE 5 MG Tabs   Last time this was given:  5 mg on 6/9/2017  9:05 AM   Commonly known as:  Nimesh Martines   Next dose due:  6/10/17        Take 5 mg by mouth daily. Lab Results Last 24 Hours (06/09/17 - 06/09/17)      CBC WITH DIFFERENTIAL WITH PLATELET [[id number]]  Resulted: 06/09/17 8983, Result status: Final result    Ordering provider: Kristy Barry MD  06/08/17 2300 Resulting lab: [facility]    Narrative: Lab - Abbreviation Name Director Address Valid Date Range    [address] Lab [facility] Hiro Walsh. Ralph Garvin M.D. [facility] [facility]  [facility]  [address] [place] [id number] 04/29/14 1047 - Present            Microbiology Results (All)     Procedure Component Value Units Da hip fracture Dr. Varun Kaba 12/05/2008, right breast intraductal carcinoma Dr. Mika Hamlin 05/2003, hysterectomy 1985, perforated peptic ulcer 1991, left mastectomy 06/2003, right cataract extraction 08/2007, left cataract extractions 08/2007, failed left hip surg NEUROLOGIC:  Oriented x3. Cranial nerves III through XII grossly intact. Motor 5+/5+ symmetrically. LABORATORY DATA:  Urinalysis reveals trace ketones, trace WBC esterase. Troponin was elevated at 0.5. LDL cholesterol 106 with an HDL of 54. Date of Consult: June 6, 2017  Reason for Consultation:   Evaluation of elevated troponin    History of Present Illness:   Patient is a 80year old female who was admitted to the hospital for <principal problem not specified>:  This is a 77-year-old woman • in child birth[other] Pancho Goetz Mother      35   • old age[other] Pancho Goetz Father      80   • Heart Disease Brother    • Heart Disease Brother      congenital heart disease / 48 cause of death   • Cancer Cousin      colon       Social History  Patient Guard Laboratory Data:    Lab Results  Component Value Date   WBC 7.2 06/06/2017   HGB 9.3* 06/06/2017   HCT 27.1* 06/06/2017    06/06/2017   CREATSERUM 1.23 06/06/2017   BUN 15 06/06/2017   * 06/06/2017   K 4.1 06/06/2017   CL 92* 06/06/2017   CO2 Room Number: 345/345-A  Evaluation Date: 6/8/2017  Type of Evaluation: Initial  Physician Order: PT Eval and Treat    Presenting Problem: Weakneas, SOB and dyspnea  Reason for Therapy: Mobility Dysfunction and Discharge Planning    PHYSICAL THERAPY ASSESSM • Osteopenia    • S/P colonoscopy 2005     mild diverticulosis / Internal hemorrhoid   • Polymyalgia rheumatica ([facility] [address]    • Portacath in place 2003     venous access   • Herpes zoster 2003     shoulder, left   • Hip fracture, left ([facility] [address]      fall - 12/4/200 Lower extremity ROM is within functional limits     Lower extremity strength is within functional limits     BALANCE  Static Sitting: Good  Dynamic Sitting: Good  Static Standing: Fair  Dynamic Standing: Fair -    ADDITIONAL TESTS  Additional Tests: None Goal #2 Patient is able to demonstrate transfers Sit to/from Stand at assistance level: modified independent with cane - straight     Goal #2  Current Status    Goal #3 Patient is able to ambulate 200 feet with assist device: cane - straight at assistance

## (undated) NOTE — ED AVS SNAPSHOT
Miguel Angel Mckeon   MRN: [id number]    Department:  [facility] Emergency Department   Date of Visit:  2/24/2018           Disclosure     Insurance plans vary and the physician(s) referred by the ER may not be covered by your plan.  Please contac within the next three months to obtain basic health screening including reassessment of your blood pressure.     IF THERE IS ANY CHANGE OR WORSENING OF YOUR CONDITION, CALL YOUR PRIMARY CARE PHYSICIAN AT ONCE OR RETURN IMMEDIATELY TO THE EMERGENCY DEPARTMEN

## (undated) NOTE — LETTER
Ronal Morejon [address], [place], [place]  [address]  INFORMED CONSENT FOR TRANSFUSION OF BLOOD OR BLOOD PRODUCTS  My physician has informed me of the nature, purpose, benefits and risks of transfusion for blood and blood components that he/she may deem necessary during my treatment or hospitalization. He/she has also discussed alternatives to receiving blood from the voluntary blood supply with me, such as self-donation (autologous) and directed donation (blood donated by family or friends to be used specifically for me). I further understand that while the [address] [place] [address] will attempt to supply any autologous or directed donor blood prior to transfusion of blood from the routine blood supply, medical circumstances may require that other or additional blood components may be required for my care. In giving consent, I acknowledge that my physician has also informed me that despite careful screening and testing in accordance with national and regional regulations, there is still a small risk of transmission of infectious agents including hepatitis, HIV-1/2, cytomegalovirus and other viruses or diseases as yet unknown for which licensed definitive screening tests do not currently exist. Additionally, my physician has informed me of the potential for transfusion reactions not related to an infectious agent. [  ]  Check here for Recurring Outpatient Transfusion Therapy (valid for 1 year) In addition to the above, my physician has informed me that I shall receive numerous transfusions over a period of time and that these can lead to other increased risks. I hereby authorize the transfusion of blood and/or blood products to me as deemed necessary and ordered by physicians participating in my care.  My physician has given me the opportunity to ask questions and any questions asked have been answered to my satisfaction  __________________________________________ ______________________________________________  (Signature of Patient)                                                            (Responsible party in case of Minor,                                                                                                 Incompetent, or unconscious Patient)  ___________________________________________       ________ ______________________________________  (Relationship to Patient)                                                       (Signature of Witness)  ______________________________________________________________________________________________   (Date)                                                                           (Time)  REFUSAL OF CONSENT FOR BLOOD TRANSFUSIONS   Sign only if Refusing   [  ] I understand refusal of blood or blood products as deemed necessary by my physician may have serious consequences to my condition to include possible death.  I hereby assume responsibility for my refusal and release the hospital, its personnel, and my physicians from any responsibility for the consequences of my refusal.    ________________________________________________________________________________  (Signature of Patient)                                                         (Responsible Party/Relationship to Patient)    ________________________________________________________________________________  (Signature of Witness)                                                       (Date/Time)     Patient Name: Can Harper     : 6/15/1935                 Printed: 2022      Medical Record #: [id number]                                 Page 1 of 1

## (undated) NOTE — LETTER
Ronal Morejon [address], [place], [place]  [address]  INFORMED CONSENT FOR TRANSFUSION OF BLOOD OR BLOOD PRODUCTS  My physician has informed me of the nature, purpose, benefits and risks of transfusion for blood and blood components that he/she may deem necessary during my treatment or hospitalization. He/she has also discussed alternatives to receiving blood from the voluntary blood supply with me, such as self-donation (autologous) and directed donation (blood donated by family or friends to be used specifically for me). I further understand that while the [address] [place] [address] will attempt to supply any autologous or directed donor blood prior to transfusion of blood from the routine blood supply, medical circumstances may require that other or additional blood components may be required for my care. In giving consent, I acknowledge that my physician has also informed me that despite careful screening and testing in accordance with national and regional regulations, there is still a small risk of transmission of infectious agents including hepatitis, HIV-1/2, cytomegalovirus and other viruses or diseases as yet unknown for which licensed definitive screening tests do not currently exist. Additionally, my physician has informed me of the potential for transfusion reactions not related to an infectious agent. [  ]  Check here for Recurring Outpatient Transfusion Therapy (valid for 1 year) In addition to the above, my physician has informed me that I shall receive numerous transfusions over a period of time and that these can lead to other increased risks. I hereby authorize the transfusion of blood and/or blood products to me as deemed necessary and ordered by physicians participating in my care.  My physician has given me the opportunity to ask questions and any questions asked have been answered to my satisfaction  __________________________________________ ______________________________________________  (Signature of Patient)                                                            (Responsible party in case of Minor,                                                                                                 Incompetent, or unconscious Patient)  ___________________________________________       ________ ______________________________________  (Relationship to Patient)                                                       (Signature of Witness)  ______________________________________________________________________________________________   (Date)                                                                           (Time)  REFUSAL OF CONSENT FOR BLOOD TRANSFUSIONS   Sign only if Refusing   [  ] I understand refusal of blood or blood products as deemed necessary by my physician may have serious consequences to my condition to include possible death.  I hereby assume responsibility for my refusal and release the hospital, its personnel, and my physicians from any responsibility for the consequences of my refusal.    ________________________________________________________________________________  (Signature of Patient)                                                         (Responsible Party/Relationship to Patient)    ________________________________________________________________________________  (Signature of Witness)                                                       (Date/Time)     Patient Name: Aisha Sims     : 6/15/1935                 Printed: 10/31/2022      Medical Record #: [id number]                                 Page 1 of 1

## (undated) NOTE — LETTER
Dear Dr. Sadia Matamoros    This letter is to inform you that Miguel Angel Mckeon has been attending {THERAPY LIST:0676} with me. See below for my most recent plan of care.

## (undated) NOTE — IP AVS SNAPSHOT
[address] [place] [address] [phone]                Discharge Summary   6/6/2017    Kelly Tinsley           Admission Information        Provider Department    6/6/2017 Merissa Jarrell MD [facility] 3w/Sw Commonly known as:  PRINIVIL,ZESTRIL   Next dose due:  6/10/17        Take 10 mg by mouth daily.                             metoprolol Tartrate 25 MG Tabs   Last time this was given:  25 mg on 6/9/2017  9:05 AM   Commonly known as:  LOPRESSOR   Next dose d 0.6 (L) (06/09/17)  0.5 (06/09/17)  0.2 (06/09/17)  0.1    (06/08/17)  84 (06/08/17)  6 (06/08/17)  8 (06/08/17)  1 (06/08/17)  1  (06/08/17)  7.3 (06/08/17)  0.6 (L) (06/08/17)  0.7 (06/08/17)  0.1 (06/08/17)  0.1    (06/07/17)  73 (06/07/17)  13 (06/07/1 and ask to get set up for an insurance coverage that is in-network with "[employer] [facility]. World Reviewer     Sign up for World Reviewer, your secure online medical record.   World Reviewer will allow you to access patient instructions from your recent visit,  view What to report to your healthcare team:  Dizziness, nausea, chest pain, weakness, numbness           Cholesterol Lowering Medications     Atorvastatin Calcium 40 MG Oral Tab       Use: Lower cholesterol, protect your heart   Most common side effects: Dizzi

## (undated) NOTE — IP AVS SNAPSHOT
[facility]            (For Outpatient Use Only) Initial Admit Date: 4/10/2022   Inpt/Obs Admit Date: Inpt: 4/10/22 / Obs: N/A   Discharge Date:    Temo Achilles:  [de-identified]   MRN: [de-identified]   CSN: [id number]   CEID: [id number]        ENCOUNTER  Patient Class: Inpatient Admitting Provider: Brenton Enriquez MD Unit: [address] [place] [address] 4/SW/SE   Hospital Service: Oncology Attending Provider: Brenton Enriquez MD   Bed: 462-A   Visit Type:   Referring Physician: No ref. provider found Billing Flag:    Admit Diagnosis: Fatigue, unspecified type [R53.83]      PATIENT  Legal Name:   Lexii Butt   Legal Sex: Female  Gender ID:              [address] [place] [address],[address] Floor Name:    PCP:  Brian Cobb MD Home: [phone]   Address:  [place] [address] : 6/15/1935 (86 yrs) Mobile: [phone]         City/State/Zip: [address] Marital:  Language: Zuleyma barker: Kendrick SSN4: [id number] Taoist: [address] Not Franky Anna*     Race: White Ethnicity: Non  Or [address] [place] [address]   Name Relationship Legal Guardian? Home Phone Work Phone Mobile Phone   1. Manjula Munroe  2.  [facility] Daughter  Daughter      [phone]    [phone]     GUARANTOR  Guarantor: Basim Magana : 6/15/1935 Home Phone: [phone]   Address: [place] [address]  Sex: Female Work Phone:    City/State/Zip: [facility], [address] [place] [address]   Rel. to Patient: Self Guarantor ID: [id number]   [address]   Employer:  Status: RETIRED     COVERAGE  PRIMARY INSURANCE   Payor: MEDICARE Plan: MEDICARE PART A&B   Group Number:  Insurance Type: INDEMNITY   Subscriber Name: Basim Magana Subscriber : 06/15/1935   Subscriber ID: [id number] Pt Rel to Subscriber: Self   SECONDARY INSURANCE   [place]   Group Number: [id number] [facility] Type: [facility] Name: Basim Magana Subscriber : 6/15/1935   Subscriber ID: [id number] Pt Rel to Subscriber: SELF TERTIARY INSURANCE   Payor:  Plan:    Group Number:  Insurance Type:    Subscriber Name:  Subscriber :    Subscriber ID:  Pt Rel to Subscriber:    Hospital Account Financial Class: Medicare    2022

## (undated) NOTE — IP AVS SNAPSHOT
[facility]            (For Outpatient Use Only) Initial Admit Date: 5/15/2022   Inpt/Obs Admit Date: Inpt: 22 / Obs: N/A   Discharge Date:    Elim IRA Cornea:  [de-identified]   MRN: [de-identified]   CSN: [id number]   CEID: [id number]        ENCOUNTER  Patient Class: Inpatient Admitting Provider: Suzan Almeida MD Unit: [address] Service: Medical Attending Provider: Suzan Almeida MD   Bed: 347-A   Visit Type:   Referring Physician: No ref. provider found Billing Flag:    Admit Diagnosis: Hyperkalemia [E87.5]      PATIENT  Legal Name:   Eliot De Jesus   Legal Sex: Female  Gender ID:              [address] [facility] Name:    PCP:  Neil Mcdaniel MD Home: [phone]   Address:  [place] [address] : 6/15/1935 (86 yrs) Mobile: [phone]         City/State/Zip: [place], [address] [place] [address] Marital:  Language: [place]: [place] SSN4: [id number] Orthodoxy: [address] Not Yudith Beagle*     Race: White Ethnicity: Non  Or [address] [place] [address]   Name Relationship Legal Guardian? Home Phone Work Phone Mobile Phone   1. LudwigManjula  2.  [facility] Daughter  Daughter      [phone]    [phone]     GUARANTOR  Guarantor: Tere Barahona : 6/15/1935 Home Phone: [phone]   Address: [place] [address]  Sex: Female Work Phone:    City/State/Zip: [place], [address] [place] [address]   Rel. to Patient: Self Guarantor ID: [id number]   [address]   Employer:  Status: RETIRED     COVERAGE  PRIMARY INSURANCE   Payor: MEDICARE Plan: MEDICARE PART A&B   Group Number:  Insurance Type: INDEMNITY   Subscriber Name: Tere Baraohna Subscriber : 06/15/1935   Subscriber ID: [id number] Pt Rel to Subscriber: Self   SECONDARY INSURANCE   [place]   Group Number: [id number] Insurance Type: [place] [address] Name: [place]e Plate Subscriber : 6/15/1935   Subscriber ID: [id number] Pt Rel to Subscriber: SELF   TERTIARY INSURANCE   Payor:  Plan:    Group Number:  Insurance Type:    Subscriber Name:  Subscriber :    Subscriber ID:  Pt Rel to Subscriber:    Hospital Account Financial Class: Medicare    May 20, 2022